# Patient Record
Sex: MALE | Race: WHITE | Employment: OTHER | URBAN - METROPOLITAN AREA
[De-identification: names, ages, dates, MRNs, and addresses within clinical notes are randomized per-mention and may not be internally consistent; named-entity substitution may affect disease eponyms.]

---

## 2019-01-28 ENCOUNTER — OFFICE VISIT (OUTPATIENT)
Dept: FAMILY MEDICINE CLINIC | Age: 64
End: 2019-01-28

## 2019-01-28 VITALS
DIASTOLIC BLOOD PRESSURE: 80 MMHG | TEMPERATURE: 95.4 F | HEIGHT: 75 IN | BODY MASS INDEX: 35.56 KG/M2 | WEIGHT: 286 LBS | HEART RATE: 50 BPM | RESPIRATION RATE: 18 BRPM | OXYGEN SATURATION: 99 % | SYSTOLIC BLOOD PRESSURE: 137 MMHG

## 2019-01-28 DIAGNOSIS — I48.20 CHRONIC ATRIAL FIBRILLATION (HCC): ICD-10-CM

## 2019-01-28 DIAGNOSIS — K21.9 GASTROESOPHAGEAL REFLUX DISEASE WITHOUT ESOPHAGITIS: ICD-10-CM

## 2019-01-28 DIAGNOSIS — Z98.890 H/O ARTHROSCOPIC KNEE SURGERY: ICD-10-CM

## 2019-01-28 DIAGNOSIS — Z92.3 S/P RADIATION THERAPY: ICD-10-CM

## 2019-01-28 DIAGNOSIS — G89.4 CHRONIC PAIN SYNDROME: ICD-10-CM

## 2019-01-28 DIAGNOSIS — Z99.89 OSA ON CPAP: ICD-10-CM

## 2019-01-28 DIAGNOSIS — N20.0 KIDNEY STONES: ICD-10-CM

## 2019-01-28 DIAGNOSIS — F41.9 ANXIETY AND DEPRESSION: ICD-10-CM

## 2019-01-28 DIAGNOSIS — Z98.890 HISTORY OF BACK SURGERY: ICD-10-CM

## 2019-01-28 DIAGNOSIS — I10 ESSENTIAL HYPERTENSION: ICD-10-CM

## 2019-01-28 DIAGNOSIS — Z96.653 HISTORY OF BILATERAL KNEE REPLACEMENT: ICD-10-CM

## 2019-01-28 DIAGNOSIS — E03.9 ACQUIRED HYPOTHYROIDISM: ICD-10-CM

## 2019-01-28 DIAGNOSIS — C44.90 SKIN CANCER: ICD-10-CM

## 2019-01-28 DIAGNOSIS — F32.A ANXIETY AND DEPRESSION: ICD-10-CM

## 2019-01-28 DIAGNOSIS — I34.0 MITRAL VALVE INSUFFICIENCY, UNSPECIFIED ETIOLOGY: ICD-10-CM

## 2019-01-28 DIAGNOSIS — C44.81 BASAL CELL CARCINOMA (BCC) OF OVERLAPPING SITES OF SKIN: ICD-10-CM

## 2019-01-28 DIAGNOSIS — G43.909 MIGRAINE SYNDROME: ICD-10-CM

## 2019-01-28 DIAGNOSIS — C07 CANCER OF PAROTID GLAND (HCC): Primary | ICD-10-CM

## 2019-01-28 DIAGNOSIS — G47.33 OSA ON CPAP: ICD-10-CM

## 2019-01-28 DIAGNOSIS — E66.01 SEVERE OBESITY (HCC): ICD-10-CM

## 2019-01-28 RX ORDER — HYDROCODONE BITARTRATE AND ACETAMINOPHEN 10; 325 MG/1; MG/1
1 TABLET ORAL
Qty: 60 TAB | Refills: 0 | Status: SHIPPED | OUTPATIENT
Start: 2019-01-28 | End: 2021-08-27 | Stop reason: ALTCHOICE

## 2019-01-28 RX ORDER — BUSPIRONE HYDROCHLORIDE 10 MG/1
10 TABLET ORAL 3 TIMES DAILY
COMMUNITY
End: 2019-06-21 | Stop reason: SDUPTHER

## 2019-01-28 RX ORDER — LUBIPROSTONE 24 UG/1
CAPSULE, GELATIN COATED ORAL
COMMUNITY
End: 2019-10-04 | Stop reason: SDUPTHER

## 2019-01-28 RX ORDER — LEVOTHYROXINE SODIUM 100 UG/1
TABLET ORAL
COMMUNITY
End: 2019-01-28 | Stop reason: SDUPTHER

## 2019-01-28 RX ORDER — MELATONIN
DAILY
COMMUNITY

## 2019-01-28 RX ORDER — METOPROLOL TARTRATE 25 MG/1
12.5 TABLET, FILM COATED ORAL
COMMUNITY
End: 2019-10-08 | Stop reason: DRUGHIGH

## 2019-01-28 RX ORDER — TAMSULOSIN HYDROCHLORIDE 0.4 MG/1
0.4 CAPSULE ORAL DAILY
COMMUNITY
End: 2019-11-04

## 2019-01-28 RX ORDER — HYDROCODONE BITARTRATE AND ACETAMINOPHEN 10; 325 MG/1; MG/1
1 TABLET ORAL
COMMUNITY
End: 2019-01-28 | Stop reason: SDUPTHER

## 2019-01-28 RX ORDER — TIZANIDINE HYDROCHLORIDE 4 MG/1
4 CAPSULE, GELATIN COATED ORAL AS NEEDED
COMMUNITY
End: 2020-01-27 | Stop reason: SINTOL

## 2019-01-28 RX ORDER — TESTOSTERONE CYPIONATE 200 MG/ML
INJECTION INTRAMUSCULAR
COMMUNITY
End: 2019-03-12 | Stop reason: SDUPTHER

## 2019-01-28 RX ORDER — LEVOTHYROXINE SODIUM 100 UG/1
100 TABLET ORAL
Qty: 90 TAB | Refills: 3 | Status: SHIPPED | OUTPATIENT
Start: 2019-01-28 | End: 2022-03-04 | Stop reason: SDUPTHER

## 2019-01-28 RX ORDER — LISINOPRIL 40 MG/1
40 TABLET ORAL DAILY
COMMUNITY
End: 2019-10-08 | Stop reason: DRUGHIGH

## 2019-01-28 RX ORDER — AMLODIPINE BESYLATE 5 MG/1
5 TABLET ORAL DAILY
Qty: 90 TAB | Refills: 3 | Status: SHIPPED | OUTPATIENT
Start: 2019-01-28 | End: 2022-01-06

## 2019-01-28 RX ORDER — TRIAMCINOLONE ACETONIDE 1 MG/G
CREAM TOPICAL
COMMUNITY
End: 2019-09-13

## 2019-01-28 RX ORDER — HYDROGEN PEROXIDE 3 %
SOLUTION, NON-ORAL MISCELLANEOUS DAILY
COMMUNITY
End: 2019-03-07 | Stop reason: SDUPTHER

## 2019-01-28 RX ORDER — FUROSEMIDE 20 MG/1
TABLET ORAL DAILY
COMMUNITY
End: 2019-01-28

## 2019-01-28 NOTE — PROGRESS NOTES
Chief Complaint Patient presents with Dyson Establish Care  Other  
  pt states that the insurance does not require referrals Michaelyn Daily .1. Have you been to the ER, urgent care clinic since your last visit? Hospitalized since your last visit? No 
 
2. Have you seen or consulted any other health care providers outside of the [address] [place] [address] since your last visit? Include any pap smears or colon screening.  No

## 2019-01-28 NOTE — PROGRESS NOTES
HISTORY OF PRESENT ILLNESS Mirna Tinajero is a 70 y.o. male. Patient is here to establish care. Patient had extensive medical history as well as surgical history and has recently moved back from [place]. He mentions In [place] he was seeing 11 specialists including his PCP. He has brought in a three page list of all the surgeries and medical issues he has as well as his medications. I have reviewed medication list. Patient is currently on hydrocodone and lyrica  for which he was receiving from his previous pain management doctor who also referred patient to have [employer] nerve stimulator in 2010 and patient mentions he just recently received a call from the company telling him it will be 8years old soon and he will need to have this replaced. Patient would like a referral to both pain management and neurosurgery. Patient has also has multiple lower back procedures and removal of a mass on the spine. He mentions he has 2 rods and 10 screws. Patient has also had knee surgery with bilateral replacements. In addition to this patient mentions he was to have extensive skin cancer 8 squamous and 1 basal cell cancer of the scalp , neck and forehead. He was under the care of dermatologist. Patient also had his precancer checks very often and has had lesions removed in the scalp , face , neck and arms. He would like to see a dermatologist here in [facility]. 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 He had a restorative surgery done in 2009 of the left side of face. He also received 28 sessions of RT. He mentions he was told he developed hypothyroidism secondary to radiation therapy and is currently on treatment.  
Patient also has a history of major depression and anxiety which does not seem to be well controlled and would like to see a psychiatrist and behavioral therapist. 
In addition to this patient is on medication for hypertension , hypogonadism , GERD /shanita on cpap. He would like to get back in to see pain management / neurosurgeon/ dermatology / psychiatry  . I have discussed ordering basic blood work today. He would also like a refill on his pain medication and I have explained this will be one time short term refill until he gets into pain management. Patient verbalizes an understanding. I have asked patient to bring in old records and he will sign for release. He mentions he does feel fine today other than his left shoulder which has been bothering him and he mentions he is in the process of talking to a orthopedic doctor in regards to this. I have also explained to him as multiple referrals have been placed he will need to provide the office with old records to better help this process. Establish Care The history is provided by the patient. This is a new problem. The problem occurs constantly. The problem has not changed since onset. Pertinent negatives include no chest pain, no abdominal pain, no headaches and no shortness of breath. The symptoms are aggravated by twisting. Nothing relieves the symptoms. He has tried nothing for the symptoms. Review of Systems Constitutional: Positive for malaise/fatigue. Negative for chills and fever. HENT: Negative for congestion, ear discharge, hearing loss, nosebleeds and sore throat. Eyes: Negative for double vision, pain and discharge. Respiratory: Negative for cough, sputum production, shortness of breath and wheezing. Cardiovascular: Negative for chest pain and palpitations. Gastrointestinal: Negative for abdominal pain, blood in stool, diarrhea, nausea and vomiting. Genitourinary: Negative for dysuria and urgency. Musculoskeletal: Positive for back pain, joint pain and neck pain. Neurological: Positive for speech change and weakness. Negative for dizziness, tingling and headaches. Endo/Heme/Allergies: Negative for environmental allergies. Psychiatric/Behavioral: Positive for depression. The patient is nervous/anxious and has insomnia. Visit Vitals /80 (BP 1 Location: Right arm, BP Patient Position: Sitting) Pulse (!) 50 Temp 95.4 °F (35.2 °C) (Oral) Resp 18 Ht 6' 3\" (1.905 m) Wt 286 lb (129.7 kg) SpO2 99% BMI 35.75 kg/m² Physical Exam  
Constitutional: He is oriented to person, place, and time. He appears well-developed and well-nourished. No distress. HENT:  
Head: Normocephalic and atraumatic. Right Ear: External ear normal.  
Left Ear: External ear normal.  
Mouth/Throat: Oropharynx is clear and moist. No oropharyngeal exudate. Eyes: EOM are normal. Pupils are equal, round, and reactive to light. No scleral icterus. Neck: Normal range of motion. No thyromegaly present. Cardiovascular: Regular rhythm and normal heart sounds. Pulmonary/Chest: Effort normal. No respiratory distress. He has no wheezes. Abdominal: Soft. Bowel sounds are normal. He exhibits no distension. There is no tenderness. Neurological: He is alert and oriented to person, place, and time. Psychiatric: He has a normal mood and affect. ASSESSMENT and PLAN Multiple medical issues : 
- Patient has provided a detail list of his medical issues with medication  
- he will also sign for release of records and mentions as he just recently moved he has some records in storage  And will drop them off to office Chronic pain syndrome : 
- 1) Drug screen to be ordered. 2) Medication to be filled for short term period. 3) You will need to see a pain management doctor/ neurosurgeon 4) Previous records and x-rays have been requested and partial medical history reviewed - currently has a pain stimulator in place , please follow up with specialist to have this replaced  reviewed Multiple skin cancer lesions removed and h/o skin cancer : 
- regularly check with dermatology - Referral has been made - Please wear sunscreen and avoid sun at times we discussed Hypertension : 
1) Goal blood pressure less than equal to 140/90 mmHg, goal BP can vary depending on risk factors as discussed. 2) Lifestyle modifications discussed with patient, low sodium <2 gm, low salt , DASH diet 3) Exercise for at least 30 min 3-5 times a week for goal BMI of less than or equal  To 25. 
4) Continue current medications as prescribed. 5) Please begin medication as discussed for better blood pressure control, explained side effects and patient verbalized understanding. 6) Goal LDL<100. 
7) Please monitor your blood pressure and keep a log to bring in with you at each visit. 8) Discussed risk factors with patient such as CAD, FAST of stroke symptoms, pt verbalizes understanding. 9) Please avoid smoking , alcohol and any illicit drug use if applicable to you. 10) Discussed lifestyle modifications ,dietary control and BP monitoring at home Hypothyroidism : 
 
-Please have blood work ordered completed today and medication to be adjusted if required. 
-Continue current medication at present dose. - Continue to exercise 30 minutes 3-5 times a week to maintain BMI of 25 or less. Anxiety and depression : 
- Referral has  Been made to see specialist  
 
 
Please come back on Friday to have blood work done

## 2019-01-28 NOTE — LETTER
AGREEMENT for controlled medication treatment [facility], have agreed to a course of treatment that includes taking controlled medication. For this treatment I designate _Dr. Maranda Cranker Patel_____________ as the Designated Provider.  The purpose of this agreement is to prevent misunderstandings about controlled medications I may be taking for treatment, and to comply with applicable laws. I understand this agreement is essential to the trust and confidence necessary in a provider-patient relationship and that the designated provider will treat me in accordance with the statements below. As part of my treatment I agree to the followin.  USE 
a. I will take the controlled medication as instructed by the designated provider and avoid improper use of controlled medications. b.   I will not share, sell or trade controlled medication with anyone as this is a criminal offense.  
c.   I will not use any illegal controlled substance, including marijuana, cocaine, etc. as this is a criminal offense. 2.  PROVIDERS 
a. I will only obtain controlled medication from the designated provider. b.   I will not attempt to obtain the same or similar controlled medications, such as opioid pain medication, stimulants, anti-anxiety or hypnotics from any other provider as this is a criminal offense. 
c.   I will inform the designated provider about all other licensed professionals providing medical care to me and authorize communication between all providers to coordinate care, particularly prescribing or dispensing of controlled medications. 3.  PHARMACY 
a.   I will only fill controlled medication prescriptions at the approved pharmacy as listed below. 4.  OFFICE VISITS 
a. I agree to attend scheduled office visit appointments. b.   I am aware my office visits may be monthly or as determined necessary by the designated provider. c.   I will communicate fully with the designated provider about the character and intensity of my symptoms, the effect of the symptoms on my daily life, and how well the controlled medication is helping to relieve the cause of my symptoms. 5.  REFILLS OR CALL-IN PRESCRIPTIONS OF CONTROLLED MEDICATIONS 
a. I agree that refills of my prescriptions for controlled medications will be made at the time of an office visit during regular office hours. No refills will be available during evenings or on weekends. Abusive or inappropriate behavior related to medication refills will not be tolerated. b.   I will not call the office repeatedly to inquire about my controlled medication. I understand that medications will be written on the due date and not before. Calling the office repeatedly will be considered harassment, and I may be discharged from the practice. c.   Controlled medications may not be called in for refill, but doing so is at the discretion of the designated provider. d.   I am aware that only I must  prescriptions for controlled medications at the office but the designated provider may allow a designee to  the prescription from the office under very specific circumstance that may develop. 6.  TOXICOLOGY SCREENING 
a. I agree to random urine toxicology screenings in order to comply with government and [address] [place] [address] regulations. I understand that I will be financially responsible for any charges incurred for the urine toxicology screening, which may not be covered by my insurance. Failure to do so will be considered non-compliance and I may be discharged. b. In some cases an oral swab or hair sample may be substituted for a urine screen. This is at the discretion of the designated provider.   I understand that I will be financially responsible for any charges incurred as well. 
c.   I understand that if the urine toxicology does not show my medications prescribed to me by the designated provider, or it shows any illegal substance or any other medications NOT prescribed by the designated providers, I may be discharged from this practice at the discretion of the designated provider and no further controlled medications will be prescribed or follow-up appointments scheduled. Also, if any illegal substances are detected on the urine toxicology, this information may be provided to local law enforcement. 7. PILL COUNTS 
a. I am aware I may be called at any time to come into the office for a count of all my remaining controlled medications in order to help the designated provider understand the rate at which I use my controlled medications and to more effectively adjust dosage. b. I agree that I will use my medication at a rate NO greater than the prescribed rate and that use of my medication at a greater rate will result in my being without medication for the period of time until next expected due date. c. I will bring in the containers with the medication prescribed by all providers, including the designated provider, to each office visit even if there is no medication remaining. All controlled medication will be in the original containers from the pharmacy for each medication. Failure to do so will be considered non-compliance and I may be discharged from the practice. 8.  LOSS OR THEFT OF MEDICATION 
a. I will safeguard my controlled medication from loss or theft. b.   Lost or stolen controlled medication may not be replaced. This includes a prescription that has not yet been filled at the pharmacy. c.   In the event my controlled medications are stolen or lost, I will notify the designated providers office immediately. If such event occurs during the night, weekend or holiday, I will leave a detailed message on the answering machine or answering service at the number listed above. d.   I will file and produce an official police report for any effort to replace controlled medications prescribed. 9.  AGENCY COLLABORATION I authorize the designated provider and the authorized pharmacy/pharmacist to cooperate fully with any city, state, or federal law enforcement agency in the investigation of any possible misuse, sale or other diversion of my controlled medication. 10.  TREATMENT I understand that if I violate any of the conditions, my controlled medication and/or treatment will be terminated. If the violation involves obtaining controlled substances and/or dangerous drugs from another source, the incident may be reported to other medical facilities and authorities, including law enforcement. In this case, the designated provider may taper off the medication over a period of several days, as necessary, to avoid withdrawal symptoms or will suggest alternate treatment facilities. Also, a drug dependence treatment program may be recommended. 11.  AGREEMENT I agree to follow these guidelines that have been fully explained to me. All of my questions and concerns regarding treatment have been adequately answered. A copy of this document has been given to me. I agree to use ______________________________ Authorized Pharmacy located at _________________________________________________________________ Telephone ________________________________for filling ALL controlled medication prescriptions. This agreement is entered into on 1/28/2019 Patient signature__________________________________________________________ Legal Guardian signature___________________________________________________ Provider signature_________________________________________________________ Witness signature_________________________________________________________

## 2019-02-01 ENCOUNTER — HOSPITAL ENCOUNTER (OUTPATIENT)
Dept: LAB | Age: 64
Discharge: HOME | End: 2019-02-01
Payer: MEDICARE

## 2019-02-01 DIAGNOSIS — Z98.890 HISTORY OF BACK SURGERY: ICD-10-CM

## 2019-02-01 DIAGNOSIS — E03.9 ACQUIRED HYPOTHYROIDISM: ICD-10-CM

## 2019-02-01 DIAGNOSIS — Z96.653 HISTORY OF BILATERAL KNEE REPLACEMENT: ICD-10-CM

## 2019-02-01 DIAGNOSIS — F32.A ANXIETY AND DEPRESSION: ICD-10-CM

## 2019-02-01 DIAGNOSIS — G43.909 MIGRAINE SYNDROME: ICD-10-CM

## 2019-02-01 DIAGNOSIS — F41.9 ANXIETY AND DEPRESSION: ICD-10-CM

## 2019-02-01 DIAGNOSIS — G89.4 CHRONIC PAIN SYNDROME: ICD-10-CM

## 2019-02-01 LAB
ALBUMIN SERPL-MCNC: 3.8 G/DL (ref 3.4–5)
ALBUMIN/GLOB SERPL: 1.1 {RATIO} (ref 0.8–1.7)
ALP SERPL-CCNC: 134 U/L (ref 45–117)
ALT SERPL-CCNC: 21 U/L (ref 16–61)
AMPHET UR QL SCN: NEGATIVE
ANION GAP SERPL CALC-SCNC: 8 MMOL/L (ref 3–18)
AST SERPL-CCNC: 15 U/L (ref 15–37)
BARBITURATES UR QL SCN: NEGATIVE
BASOPHILS # BLD: 0.1 K/UL (ref 0–0.1)
BASOPHILS NFR BLD: 1 % (ref 0–2)
BENZODIAZ UR QL: NEGATIVE
BILIRUB SERPL-MCNC: 0.8 MG/DL (ref 0.2–1)
BUN SERPL-MCNC: 13 MG/DL (ref 7–18)
BUN/CREAT SERPL: 13 (ref 12–20)
CALCIUM SERPL-MCNC: 8.9 MG/DL (ref 8.5–10.1)
CANNABINOIDS UR QL SCN: NEGATIVE
CHLORIDE SERPL-SCNC: 107 MMOL/L (ref 100–108)
CHOLEST SERPL-MCNC: 136 MG/DL
CO2 SERPL-SCNC: 26 MMOL/L (ref 21–32)
COCAINE UR QL SCN: NEGATIVE
CREAT SERPL-MCNC: 1.01 MG/DL (ref 0.6–1.3)
DIFFERENTIAL METHOD BLD: NORMAL
EOSINOPHIL # BLD: 0.1 K/UL (ref 0–0.4)
EOSINOPHIL NFR BLD: 2 % (ref 0–5)
ERYTHROCYTE [DISTWIDTH] IN BLOOD BY AUTOMATED COUNT: 13.1 % (ref 11.6–14.5)
GLOBULIN SER CALC-MCNC: 3.6 G/DL (ref 2–4)
GLUCOSE SERPL-MCNC: 80 MG/DL (ref 74–99)
HCT VFR BLD AUTO: 46.4 % (ref 36–48)
HDLC SERPL-MCNC: 46 MG/DL (ref 40–60)
HDLC SERPL: 3 {RATIO} (ref 0–5)
HDSCOM,HDSCOM: ABNORMAL
HGB BLD-MCNC: 15 G/DL (ref 13–16)
LDLC SERPL CALC-MCNC: 77.4 MG/DL (ref 0–100)
LIPID PROFILE,FLP: NORMAL
LYMPHOCYTES # BLD: 3.1 K/UL (ref 0.9–3.6)
LYMPHOCYTES NFR BLD: 38 % (ref 21–52)
MCH RBC QN AUTO: 30.9 PG (ref 24–34)
MCHC RBC AUTO-ENTMCNC: 32.3 G/DL (ref 31–37)
MCV RBC AUTO: 95.7 FL (ref 74–97)
METHADONE UR QL: NEGATIVE
MONOCYTES # BLD: 0.5 K/UL (ref 0.05–1.2)
MONOCYTES NFR BLD: 6 % (ref 3–10)
NEUTS SEG # BLD: 4.4 K/UL (ref 1.8–8)
NEUTS SEG NFR BLD: 53 % (ref 40–73)
OPIATES UR QL: POSITIVE
PCP UR QL: NEGATIVE
PLATELET # BLD AUTO: 279 K/UL (ref 135–420)
PMV BLD AUTO: 10.6 FL (ref 9.2–11.8)
POTASSIUM SERPL-SCNC: 4.7 MMOL/L (ref 3.5–5.5)
PROT SERPL-MCNC: 7.4 G/DL (ref 6.4–8.2)
RBC # BLD AUTO: 4.85 M/UL (ref 4.7–5.5)
SODIUM SERPL-SCNC: 141 MMOL/L (ref 136–145)
T4 FREE SERPL-MCNC: 1.4 NG/DL (ref 0.7–1.5)
TRIGL SERPL-MCNC: 63 MG/DL (ref ?–150)
TSH SERPL DL<=0.05 MIU/L-ACNC: 2.5 UIU/ML (ref 0.36–3.74)
VLDLC SERPL CALC-MCNC: 12.6 MG/DL
WBC # BLD AUTO: 8.2 K/UL (ref 4.6–13.2)

## 2019-02-01 PROCEDURE — 84443 ASSAY THYROID STIM HORMONE: CPT

## 2019-02-01 PROCEDURE — 80061 LIPID PANEL: CPT

## 2019-02-01 PROCEDURE — 80307 DRUG TEST PRSMV CHEM ANLYZR: CPT

## 2019-02-01 PROCEDURE — 80053 COMPREHEN METABOLIC PANEL: CPT

## 2019-02-01 PROCEDURE — 84439 ASSAY OF FREE THYROXINE: CPT

## 2019-02-01 PROCEDURE — 85025 COMPLETE CBC W/AUTO DIFF WBC: CPT

## 2019-02-04 ENCOUNTER — TELEPHONE (OUTPATIENT)
Dept: FAMILY MEDICINE CLINIC | Age: 64
End: 2019-02-04

## 2019-02-04 NOTE — TELEPHONE ENCOUNTER
Rupinder with [address] called and stated she can send the last 3 years records to include pet scans, stress tests, and ekg. Makenzie Sanderson stated pt's record is too extensive to send entire record dated back to 2004. If additional records are needed then a separate request will need to be provided with date specification.

## 2019-02-11 ENCOUNTER — TELEPHONE (OUTPATIENT)
Dept: FAMILY MEDICINE CLINIC | Age: 64
End: 2019-02-11

## 2019-02-13 ENCOUNTER — TELEPHONE (OUTPATIENT)
Dept: FAMILY MEDICINE CLINIC | Age: 64
End: 2019-02-13

## 2019-02-13 NOTE — TELEPHONE ENCOUNTER
Pt called requesting a referral for pain he is having in left hip. Pt states pain is debilitating and preventing him from walking. Pt stated he is also not able to sleep through the night due to pain in hip. Pt is scheduled for a first available acute appt for 03/07/19 @ 10:15am.  Pt would like a return call as he does not believe he discussed this with provider at np appt and pt stated the pain presented after his initial np appt.

## 2019-02-14 NOTE — TELEPHONE ENCOUNTER
Attempted to reach out to discuss the issue with patient it seems the phone line is not in service at this time. I can try again at another time however if the pain is acute he can go to an urgent care unit or come in for an x-ray of the left hip here first. He is already on pain medications and will be contacting pain management.

## 2019-02-22 ENCOUNTER — TELEPHONE (OUTPATIENT)
Dept: FAMILY MEDICINE CLINIC | Age: 64
End: 2019-02-22

## 2019-02-22 DIAGNOSIS — M25.552 LEFT HIP PAIN: Primary | ICD-10-CM

## 2019-02-22 NOTE — TELEPHONE ENCOUNTER
Pt following up on phone call regarding hip pain. Advised pt Dr Michael Sol has been trying to reach him. Followed up on phone # with pt. Pt stated phone # was wrong. Pt has given correct # and has been updated in the system. Advised pt of message left by Dr Barry Davis. Pt states does want to get an xray. States will come today for xray.

## 2019-02-25 ENCOUNTER — TELEPHONE (OUTPATIENT)
Dept: FAMILY MEDICINE CLINIC | Age: 64
End: 2019-02-25

## 2019-03-07 ENCOUNTER — OFFICE VISIT (OUTPATIENT)
Dept: FAMILY MEDICINE CLINIC | Age: 64
End: 2019-03-07

## 2019-03-07 VITALS
WEIGHT: 286 LBS | BODY MASS INDEX: 35.56 KG/M2 | TEMPERATURE: 95.1 F | HEIGHT: 75 IN | DIASTOLIC BLOOD PRESSURE: 69 MMHG | OXYGEN SATURATION: 98 % | SYSTOLIC BLOOD PRESSURE: 126 MMHG | HEART RATE: 48 BPM | RESPIRATION RATE: 16 BRPM

## 2019-03-07 DIAGNOSIS — Z98.890 HISTORY OF BACK SURGERY: ICD-10-CM

## 2019-03-07 DIAGNOSIS — M16.12 PRIMARY OSTEOARTHRITIS OF LEFT HIP: Primary | ICD-10-CM

## 2019-03-07 DIAGNOSIS — Z96.653 HISTORY OF BILATERAL KNEE REPLACEMENT: ICD-10-CM

## 2019-03-07 DIAGNOSIS — I48.20 CHRONIC ATRIAL FIBRILLATION (HCC): ICD-10-CM

## 2019-03-07 DIAGNOSIS — E29.1 HYPOGONADISM MALE: ICD-10-CM

## 2019-03-07 DIAGNOSIS — G89.4 CHRONIC PAIN SYNDROME: ICD-10-CM

## 2019-03-07 RX ORDER — HYDROCODONE BITARTRATE AND ACETAMINOPHEN 10; 325 MG/1; MG/1
1 TABLET ORAL
Qty: 60 TAB | Refills: 0 | Status: SHIPPED | OUTPATIENT
Start: 2019-03-07 | End: 2019-04-06

## 2019-03-07 RX ORDER — HYDROGEN PEROXIDE 3 %
40 SOLUTION, NON-ORAL MISCELLANEOUS DAILY
Qty: 90 CAP | Refills: 3 | Status: SHIPPED | OUTPATIENT
Start: 2019-03-07 | End: 2019-08-28 | Stop reason: SDUPTHER

## 2019-03-07 NOTE — PROGRESS NOTES
HISTORY OF PRESENT ILLNESS  Jann Vale is a 70 y.o. male. Patient is here to follow up on his left hip pain which has been getting worse and I have reviewed recent x-ray with him. I have discussed referral to orthopedic specialist for possible further therapy or joint injections as patient mentions he has a lot of pain in the left hip which radiates to the groin. He also mentions he used to be on testosterone shots and he is wondering if he can go back on. I will order blood work. He would also like a referral for chronic afib which is stable. He would also like to have a referral to pain management but with Dr. Wojciech Pak. His referral to neurosurgery  Has to be redone as who he was referred to does not except insurance for recheck on pain stimulator. He mentions he will need a refill on pain medication as his stimulator has not  and he is in a lot of pain. Hip Injury    The history is provided by the patient. This is a chronic problem. The problem occurs constantly. The problem has been gradually worsening. The pain is present in the left hip. The quality of the pain is described as aching, pounding and sharp. The pain is at a severity of 8/10. The pain is severe. Associated symptoms include stiffness, back pain and neck pain. The symptoms are aggravated by movement and palpation. He has tried arthritis medications for the symptoms. The treatment provided mild relief. There has been no history of extremity trauma. Review of Systems   Constitutional: Positive for malaise/fatigue. Negative for chills, fever and weight loss. HENT: Negative for congestion, ear discharge, ear pain, hearing loss, sinus pain and sore throat. Eyes: Negative for blurred vision, double vision, pain and discharge. Respiratory: Negative for cough, hemoptysis, sputum production, shortness of breath and wheezing. Cardiovascular: Negative for chest pain, palpitations, claudication and leg swelling. Gastrointestinal: Positive for heartburn. Negative for abdominal pain, blood in stool, diarrhea, nausea and vomiting. Genitourinary: Positive for urgency. Negative for dysuria, flank pain, frequency and hematuria. Musculoskeletal: Positive for back pain, joint pain, neck pain and stiffness. Neurological: Negative for dizziness, focal weakness and headaches. Psychiatric/Behavioral: Negative for depression. The patient is not nervous/anxious. Physical Exam   Constitutional: He is oriented to person, place, and time. He appears well-developed and well-nourished. No distress. HENT:   Head: Normocephalic and atraumatic. Right Ear: External ear normal.   Left Ear: External ear normal.   Mouth/Throat: Oropharynx is clear and moist. No oropharyngeal exudate. Eyes: EOM are normal. Pupils are equal, round, and reactive to light. No scleral icterus. Neck: Normal range of motion. No thyromegaly present. Cardiovascular: Normal rate, regular rhythm and normal heart sounds. Pulmonary/Chest: Effort normal and breath sounds normal. No respiratory distress. He has no wheezes. Abdominal: Soft. Bowel sounds are normal. He exhibits no distension. There is no tenderness. Musculoskeletal:        Left hip: He exhibits decreased range of motion, decreased strength and tenderness. Legs:  Lymphadenopathy:     He has no cervical adenopathy. Neurological: He is alert and oriented to person, place, and time. Psychiatric: He has a normal mood and affect. ASSESSMENT and PLAN  Left hip osteoarthritis :  - discussed referral to orthopedic  - pain medication refilled short term    Hypogonadism ;'  - come back for blood work    Chronic Afib:  - Coronary artery disease risk factors discussed. - Please maintain routine exercise regimen , 30 minutes 3-5 times a week. - Cholesterol and blood pressure control  - Ok to take aspirin 81 mg daily  - Continue current medications and f/u with cardiology. New referrals to be processed for pain management and neurosurgery

## 2019-03-07 NOTE — ACP (ADVANCE CARE PLANNING)
Advance Care Planning (ACP) Provider Note - Comprehensive     Date of ACP Conversation: 03/07/19  Persons included in Conversation:  patient  Length of ACP Conversation in minutes:  <16 minutes (Non-Billable)    Authorized Decision Maker (if patient is incapable of making informed decisions):    This person is:  Healthcare Agent/Medical Power of  under Advance Directive            General ACP for ALL Patients with Decision Making Capacity:   Importance of advance care planning, including choosing a healthcare agent to communicate patient's healthcare decisions if patient lost the ability to make decisions, such as after a sudden illness or accident    Review of Existing Advance Directive:  Has this in place    For Serious or Chronic Illness:  Aware     Interventions Provided:  Recommended completion of Advance Directive form after review of ACP materials and conversation with prospective healthcare agent   Recommended communicating the plan and making copies for the healthcare agent, personal physician, and others as appropriate (e.g., health system)  Recommended review of completed ACP document annually or upon change in health status

## 2019-03-08 ENCOUNTER — HOSPITAL ENCOUNTER (OUTPATIENT)
Dept: LAB | Age: 64
Discharge: HOME | End: 2019-03-08
Payer: MEDICARE

## 2019-03-08 ENCOUNTER — TELEPHONE (OUTPATIENT)
Dept: FAMILY MEDICINE CLINIC | Age: 64
End: 2019-03-08

## 2019-03-08 ENCOUNTER — LAB ONLY (OUTPATIENT)
Dept: FAMILY MEDICINE CLINIC | Age: 64
End: 2019-03-08

## 2019-03-08 DIAGNOSIS — E29.1 HYPOGONADISM MALE: ICD-10-CM

## 2019-03-08 DIAGNOSIS — Z01.89 ROUTINE LAB DRAW: Primary | ICD-10-CM

## 2019-03-08 PROCEDURE — 84403 ASSAY OF TOTAL TESTOSTERONE: CPT

## 2019-03-08 RX ORDER — ESOMEPRAZOLE MAGNESIUM 40 MG/1
40 CAPSULE, DELAYED RELEASE ORAL DAILY
Qty: 180 CAP | Refills: 3 | Status: SHIPPED | OUTPATIENT
Start: 2019-03-08 | End: 2022-01-06 | Stop reason: ALTCHOICE

## 2019-03-08 NOTE — TELEPHONE ENCOUNTER
Spoke to provider asked for a verbal order so patient may have their medication today, sent over 40mg nexium, 90 day supply with 3 refills, patient aware as well

## 2019-03-08 NOTE — TELEPHONE ENCOUNTER
Per fax from [address] [facility] esomeprazole dr 20 mg cap requires a PA. Or can send in a new rx for 40 mg once a day.

## 2019-03-10 LAB
TESTOST FREE SERPL-MCNC: 8.8 PG/ML (ref 6.6–18.1)
TESTOST SERPL-MCNC: 406 NG/DL (ref 264–916)

## 2019-03-12 ENCOUNTER — TELEPHONE (OUTPATIENT)
Dept: FAMILY MEDICINE CLINIC | Age: 64
End: 2019-03-12

## 2019-03-12 ENCOUNTER — DOCUMENTATION ONLY (OUTPATIENT)
Dept: FAMILY MEDICINE CLINIC | Age: 64
End: 2019-03-12

## 2019-03-12 DIAGNOSIS — E29.1 HYPOGONADISM MALE: Primary | ICD-10-CM

## 2019-03-12 NOTE — TELEPHONE ENCOUNTER
Pt requested this Rx and needles. Requested Prescriptions     Pending Prescriptions Disp Refills    testosterone cypionate (DEPOTESTOTERONE CYPIONATE) 200 mg/mL injection 0 Vial      Sig: by IntraMUSCular route every seven (7) days.

## 2019-03-12 NOTE — TELEPHONE ENCOUNTER
Pt requests a referral to [facility], [address], [place]: [phone]

## 2019-03-13 ENCOUNTER — TELEPHONE (OUTPATIENT)
Dept: FAMILY MEDICINE CLINIC | Age: 64
End: 2019-03-13

## 2019-03-13 RX ORDER — TESTOSTERONE CYPIONATE 200 MG/ML
100 INJECTION INTRAMUSCULAR
Qty: 1 VIAL | Refills: 3 | Status: SHIPPED | OUTPATIENT
Start: 2019-03-13 | End: 2019-06-21 | Stop reason: SDUPTHER

## 2019-03-18 NOTE — TELEPHONE ENCOUNTER
Thank you Alexandra Boyer and referral sent as patient requested.     [facility]   [address] [facility], [address]  Phone: [phone]  Fax: [phone]

## 2019-03-19 ENCOUNTER — TELEPHONE (OUTPATIENT)
Dept: FAMILY MEDICINE CLINIC | Age: 64
End: 2019-03-19

## 2019-03-19 NOTE — TELEPHONE ENCOUNTER
t requesting rx for syringes to be sent to pharmacy. Pt states wife able to give him testosterone injections.   Please send to pharmacy on file

## 2019-03-22 ENCOUNTER — OFFICE VISIT (OUTPATIENT)
Dept: ORTHOPEDIC SURGERY | Facility: CLINIC | Age: 64
End: 2019-03-22

## 2019-03-22 VITALS
BODY MASS INDEX: 36.06 KG/M2 | WEIGHT: 290 LBS | SYSTOLIC BLOOD PRESSURE: 149 MMHG | TEMPERATURE: 96 F | RESPIRATION RATE: 18 BRPM | DIASTOLIC BLOOD PRESSURE: 86 MMHG | HEIGHT: 75 IN | HEART RATE: 67 BPM | OXYGEN SATURATION: 99 %

## 2019-03-22 DIAGNOSIS — M54.50 LUMBAR PAIN: ICD-10-CM

## 2019-03-22 DIAGNOSIS — M16.12 PRIMARY OSTEOARTHRITIS OF LEFT HIP: ICD-10-CM

## 2019-03-22 DIAGNOSIS — M54.32 SCIATICA, LEFT SIDE: ICD-10-CM

## 2019-03-22 DIAGNOSIS — M70.62 TROCHANTERIC BURSITIS OF LEFT HIP: Primary | ICD-10-CM

## 2019-03-22 DIAGNOSIS — M54.16 LUMBAR RADICULOPATHY: ICD-10-CM

## 2019-03-22 DIAGNOSIS — M25.552 LEFT HIP PAIN: ICD-10-CM

## 2019-03-22 RX ORDER — BUPIVACAINE HYDROCHLORIDE 2.5 MG/ML
4 INJECTION, SOLUTION EPIDURAL; INFILTRATION; INTRACAUDAL ONCE
Qty: 4 ML | Refills: 0
Start: 2019-03-22 | End: 2019-03-22

## 2019-03-22 RX ORDER — IBUPROFEN 200 MG
TABLET ORAL
COMMUNITY
End: 2019-09-13

## 2019-03-22 RX ORDER — TRIAMCINOLONE ACETONIDE 40 MG/ML
40 INJECTION, SUSPENSION INTRA-ARTICULAR; INTRAMUSCULAR ONCE
Qty: 0.5 ML | Refills: 0
Start: 2019-03-22 | End: 2019-03-22

## 2019-03-22 RX ORDER — HYDROXYZINE 50 MG/1
50 TABLET, FILM COATED ORAL
COMMUNITY
End: 2020-01-27

## 2019-03-22 RX ORDER — FUROSEMIDE 20 MG/1
10 TABLET ORAL DAILY
COMMUNITY
Start: 2019-02-12 | End: 2019-11-27 | Stop reason: SDUPTHER
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
## 2019-03-25 ENCOUNTER — TELEPHONE (OUTPATIENT)
Dept: FAMILY MEDICINE CLINIC | Age: 64
End: 2019-03-25

## 2019-03-25 NOTE — TELEPHONE ENCOUNTER
Pt requesting referral to cardiology to be in [place]. Pt states [place] to far to travel.   [facility] was contacted by cardiology but has canceled appt

## 2019-03-25 NOTE — TELEPHONE ENCOUNTER
Pt called to let us know that the Neurosurgical referral to [facility] [address] [place] [address],-1 needs to be changed to a Neurosurgeon's office. They don't do surgery at above practice.

## 2019-03-26 NOTE — TELEPHONE ENCOUNTER
[address] (Tiffani Turner)  MD Jacquelyn Crain MD, [address] [address]. [address] [facility] [address], [address]  Phone: [phone]  Fax: [phone]    Faxed note and confirmation received.

## 2019-04-17 NOTE — TELEPHONE ENCOUNTER
Pt states has not heard anything regarding referral to cardiology or neuro surgeon. Gave pt all info on file for both referrals. Pt states will call them to set up an appt.

## 2019-04-17 NOTE — TELEPHONE ENCOUNTER
I spoke to [facility] and confirmed that they do not  Accept the patients insurance. Purnima agreed to call and notify the patient of this.  Pt's referral was sent to [facility] due to not having any other provider in the area accepting his insurance for dx reason of referral.

## 2019-04-17 NOTE — TELEPHONE ENCOUNTER
Pt states called [address] [place] [address] neuro for neuor surgeon. States was told all info was faxed to Vascular dept. States pt will need to see Dr Cate Love. States please fax all info to [facility] at Dr Cate Love office Fax # [phone], ph # [phone]. Advised pt office received communication from Dr Cate Love office stating unable to schedule appt due to insurance not accepted. Pt states Purnima did not mention that to him. States she needed all paperwork before she can schedule pt for appt. Pt states if Do not accept insurance please find a facility that accepts insurance in [place].   Pt states please call back with any updates

## 2019-04-17 NOTE — TELEPHONE ENCOUNTER
Correction to last note. Insurance is not accepted in the area and limited provider that will see pt for dx cause. [facility] is the location that accepts both.

## 2019-04-23 ENCOUNTER — TELEPHONE (OUTPATIENT)
Dept: FAMILY MEDICINE CLINIC | Age: 64
End: 2019-04-23

## 2019-05-01 ENCOUNTER — TELEPHONE (OUTPATIENT)
Dept: FAMILY MEDICINE CLINIC | Age: 64
End: 2019-05-01

## 2019-05-02 ENCOUNTER — OFFICE VISIT (OUTPATIENT)
Dept: ORTHOPEDIC SURGERY | Age: 64
End: 2019-05-02

## 2019-05-02 VITALS
HEART RATE: 62 BPM | HEIGHT: 75 IN | BODY MASS INDEX: 34.57 KG/M2 | SYSTOLIC BLOOD PRESSURE: 142 MMHG | WEIGHT: 278 LBS | DIASTOLIC BLOOD PRESSURE: 94 MMHG

## 2019-05-02 DIAGNOSIS — M47.814 THORACIC SPONDYLOSIS WITHOUT MYELOPATHY: ICD-10-CM

## 2019-05-02 DIAGNOSIS — M96.1 LUMBAR POST-LAMINECTOMY SYNDROME: ICD-10-CM

## 2019-05-02 DIAGNOSIS — M51.34 DDD (DEGENERATIVE DISC DISEASE), THORACIC: ICD-10-CM

## 2019-05-02 DIAGNOSIS — M47.817 LUMBOSACRAL SPONDYLOSIS WITHOUT MYELOPATHY: ICD-10-CM

## 2019-05-02 DIAGNOSIS — M51.36 DDD (DEGENERATIVE DISC DISEASE), LUMBAR: ICD-10-CM

## 2019-05-02 DIAGNOSIS — M54.50 LOW BACK PAIN AT MULTIPLE SITES: Primary | ICD-10-CM

## 2019-05-02 DIAGNOSIS — M54.16 LUMBAR NEURITIS: ICD-10-CM

## 2019-05-02 NOTE — LETTER
5/2/19 Patient: Gabby Whipple YOB: 1955 Date of Visit: 5/2/2019 Fain Paget, MD 
[address] [facility] [address] [address] VIA In Basket Flor Mooney MD 
[address] [address] [facility] 
[address] [facility] [address] VIA In Basket Dear Fain Paget, MD Josetta Creighton, MD, Thank you for referring Mr. Hunter Madrid to [address] [place] [address] for evaluation. My notes for this consultation are attached. If you have questions, please do not hesitate to call me. I look forward to following your patient along with you. Sincerely, Rosa Linton MD

## 2019-05-02 NOTE — TELEPHONE ENCOUNTER
Pt called  To ask for a referral to this neurosurgeon: Nisreen Curran Dr. He also stated he would like the cardio referral refaxed to [address] [facility] He said thank you so much.
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
## 2019-05-13 ENCOUNTER — TELEPHONE (OUTPATIENT)
Dept: FAMILY MEDICINE CLINIC | Age: 64
End: 2019-05-13

## 2019-05-13 ENCOUNTER — DOCUMENTATION ONLY (OUTPATIENT)
Dept: ORTHOPEDIC SURGERY | Age: 64
End: 2019-05-13

## 2019-05-13 NOTE — PROGRESS NOTES
Order and office notes have been faxed to [facility] to have them call patient to set up CT Myelogram for T&L-Spine. They will authorize with the insurance if needed. Patient aware.

## 2019-05-13 NOTE — TELEPHONE ENCOUNTER
Pt following up on referral to cardiology. Advised [facility]  States will call for appt

## 2019-05-21 ENCOUNTER — TELEPHONE (OUTPATIENT)
Dept: ORTHOPEDIC SURGERY | Age: 64
End: 2019-05-21

## 2019-05-21 NOTE — TELEPHONE ENCOUNTER
A fax was received from [facility] Cirilo Stein requesting the insurance authorization for the CT of the Thoracic and Lumbar Spine ordered at [facility]. I have forwarded the fax to Gillian Dueñas at fax [phone]. If we do not obtain the authorization then [facility] will cancel the tests. Thank you.

## 2019-05-21 NOTE — TELEPHONE ENCOUNTER
[employer] authorization obtained and faxed to [address] [place] [address] # [id number] valid 05-22-19 through 06-21-19

## 2019-05-28 ENCOUNTER — TELEPHONE (OUTPATIENT)
Dept: ORTHOPEDIC SURGERY | Age: 64
End: 2019-05-28

## 2019-05-28 NOTE — TELEPHONE ENCOUNTER
Called back to speak with Mercedes Noriega but did not get an answer. Will try again. I do not see that an MRI was ordered for this patient. I see that a CT Myelogram was order and signed by Dr. Shawn Allen.

## 2019-05-28 NOTE — TELEPHONE ENCOUNTER
Mary Noriega from [facility] called stating that they have an order for an MRI for Mr. Guadalupe Moreno that is signed by an NP and it needs to be signed by Dr. Patrecia Duverney himself. She said Mr. Guadalupe Moreno has an appointment tomorrow and needs this right away. She left her call back number as [phone] and her fax number as [phone].  Please advise

## 2019-06-06 NOTE — TELEPHONE ENCOUNTER
Spoke to Magan Harrell at [employer] . It appears that the CT Myelogram was ordered and was already completed. The original order was signed my Dr. Jaime Willis and not a NP. Magan Harrell believes this was just a miscommunication. Closing message at this time.

## 2019-06-20 ENCOUNTER — OFFICE VISIT (OUTPATIENT)
Dept: ORTHOPEDIC SURGERY | Age: 64
End: 2019-06-20

## 2019-06-20 VITALS
TEMPERATURE: 98 F | HEIGHT: 75 IN | OXYGEN SATURATION: 98 % | BODY MASS INDEX: 35.19 KG/M2 | SYSTOLIC BLOOD PRESSURE: 129 MMHG | HEART RATE: 75 BPM | DIASTOLIC BLOOD PRESSURE: 83 MMHG | RESPIRATION RATE: 20 BRPM | WEIGHT: 283 LBS

## 2019-06-20 DIAGNOSIS — M47.817 LUMBOSACRAL SPONDYLOSIS WITHOUT MYELOPATHY: ICD-10-CM

## 2019-06-20 DIAGNOSIS — M54.16 LUMBAR NEURITIS: ICD-10-CM

## 2019-06-20 DIAGNOSIS — M48.061 SPINAL STENOSIS OF LUMBAR REGION WITHOUT NEUROGENIC CLAUDICATION: ICD-10-CM

## 2019-06-20 DIAGNOSIS — M46.1 SACROILIITIS (HCC): Primary | ICD-10-CM

## 2019-06-20 DIAGNOSIS — M47.814 THORACIC SPONDYLOSIS WITHOUT MYELOPATHY: ICD-10-CM

## 2019-06-20 NOTE — LETTER
6/20/19 Patient: Deng Shirley YOB: 1955 Date of Visit: 6/20/2019 Saran Carrera MD 
[address] [place] [address] [address] [place] [id number] VIA In Basket Dear Saran Carrera MD, Thank you for referring Mr. Mattie Cole to [address] [place] [address] for evaluation. My notes for this consultation are attached. If you have questions, please do not hesitate to call me. I look forward to following your patient along with you. Sincerely, Kiara Molina MD

## 2019-06-20 NOTE — PROGRESS NOTES
[facility]  [address], [address]  Phone: [phone]  Fax: [phone]        PROGRESS NOTE      HISTORY OF PRESENT ILLNESS:  The patient is a 75 y.o. male and was seen today for follow up of low back pain extending into the LLE in an S1 distribution to the knee, with recent exacerbation after a fall out of the back of a Uhaul on 12/18/18. Pt reports bowel incontinence, with immediate onset following the incident. Denies bladder incontinence. Pt denies fever, weight loss, or skin changes. The patient is RHD. PmHx spinal surgery x 3, SCS implantation [employer]), squamous cell carcinoma. Pt is currently being followed by Dr. Rosalba Montero for chronic pain management. Note from Ramesh Lemos MD dated 3/7/19 indicating patient was seen with c/o left hip and groin pain. Noted, pt is being followed by Dr. Rosalba Montero for pain management. Note from Dr. Adonis Orozco dated 3/22/19 indicating patient was seen with c/o left hip pain since an accident on 12/18/18 when he fell out of the back of a Uhaul. He landed hard on his buttocks and has been experiencing pain ever since. Performed a left bursa injection at that time. Of note, pt has had spinal surgeries x 4 and had severe bleeding complications with his last surgery. Preliminary reading of lumbar plain films: SCS noted in the right lower quadrant with leads extending into the spinal column. Posterior fusion L2 through S1, with posterior hardware extending from L2 through L5. Hardware appears to be intact. Moderate degenerative changes noted extending from L1 to the lower thoracic spine. At his last clinical appointment, patient presented with c/o low back pain extending into the LLE, with recent exacerbation after a fall out of the back of a Uhaul on 12/18/18. He reported bowel incontinence, with immediate onset following the incident. I set him up for a CT myelogram of the lumbar and thoracic spine.  I advised patient to bring copies of films to next visit. I contacted the patient's SCS rep for an SCS evaluation and possible battery replacement. I recommended he continue to f/u with Dr. Yesenia Romero for pain management. The patient returns today with pain location and distribution remain unchanged. He continues to rate his pain 6/10. Pt denies any major changes since his last OV. He continue to report bowel incontinence x 6 months. Thoracic spine CT myelogram dated 5/29/19 reviewed. Per report, mild spinal canal narrowing at T10-11 due to facet arthropathy and ligamentum flavum hypertrophy. Moderate left foraminal narrowing at T3-4 due to a left facet arthropathy. Mild spondylosis of the remaining thoracic spine with no significant stenosis. Spinal cord stimulator electrodes at T8. Lumbar spine CT myelogram dated 5/29/19 reviewed. Per report, postoperative changes from L2 to S1 from posterior spinal decompression and fusion. The central canal and recesses are well decompressed at these levels. Moderate central canal and right foraminal narrowing at L1-L2 due to a disc bulge and facet arthropathy. Findings consistent with arachnoiditis. Nonobstructing right renal calculus.  reviewed. Body mass index is 35.37 kg/m².       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    Cancer ([facility] [address]     Depression     Endocrine disease     Hypothyroidism    Fractures 1978, 2000    ankle left, left wrist    Headache     Hearing loss 2013    Hypertension     Ill-defined condition     Pain stimulator in back    Ill-defined condition     irregular heartbeat    Psychiatric disorder     depression    Thyroid disease         Social History     Socioeconomic History    Marital status:      Spouse name: Not on file    Number of children: Not on file    Years of education: Not on file    Highest education level: Not on file   Occupational History    Not on file   Social Needs    Financial resource strain: Not on file    Food insecurity:     Worry: Not on file     Inability: Not on file    Transportation needs:     Medical: Not on file     Non-medical: Not on file   Tobacco Use    Smoking status: Former Smoker     Last attempt to quit: 1987     Years since quittin.4    Smokeless tobacco: Never Used   Substance and Sexual Activity    Alcohol use: No     Frequency: Never    Drug use: No    Sexual activity: Never     Partners: Female   Lifestyle    Physical activity:     Days per week: Not on file     Minutes per session: Not on file    Stress: Not on file   Relationships    Social connections:     Talks on phone: Not on file     Gets together: Not on file     Attends [employer] service: Not on file     Active member of club or organization: Not on file     Attends meetings of clubs or organizations: Not on file     Relationship status: Not on file    Intimate partner violence:     Fear of current or ex partner: Not on file     Emotionally abused: Not on file     Physically abused: Not on file     Forced sexual activity: Not on file   Other Topics Concern    Not on file   Social History Narrative    ** Merged History Encounter **            Current Outpatient Medications   Medication Sig Dispense Refill    ibuprofen (MOTRIN) 200 mg tablet Take  by mouth.  Needle, Disp, 21 G (HYPODERMIC NEEDLES) 21 gauge x 1\" ndle Inject 0.5ml  intramuscular route every 7 days 10 Pen Needle 5    Syringe with Needle, Disp, (MONOJECT SAFETY SYRINGES) syrg To be used with medication 30 Syringe 3    testosterone cypionate (DEPOTESTOTERONE CYPIONATE) 200 mg/mL injection 0.5 mL by IntraMUSCular route every seven (7) days. Max Daily Amount: 100 mg. 1 Vial 3    esomeprazole (NEXIUM) 40 mg capsule Take 1 Cap by mouth daily.  180 Cap 3    lisinopril (PRINIVIL, ZESTRIL) 40 mg tablet Take 40 mg by mouth daily.  tamsulosin (FLOMAX) 0.4 mg capsule Take 0.4 mg by mouth daily.  metoprolol tartrate (LOPRESSOR) 25 mg tablet Take 12.5 mg by mouth every morning. 25mg 1/2 in am      tiZANidine (ZANAFLEX) 4 mg capsule Take 4 mg by mouth two (2) times a day.  AMLODIPINE BESYLATE, BULK, Take 5 mg by mouth daily.  HYDROcodone-acetaminophen (NORCO)  mg tablet Take 1 Tab by mouth every twelve (12) hours as needed for Pain. Max Daily Amount: 2 Tabs. 60 Tab 0    amLODIPine (NORVASC) 5 mg tablet Take 1 Tab by mouth daily. 90 Tab 3    levothyroxine (SYNTHROID) 100 mcg tablet Take 1 Tab by mouth Daily (before breakfast). 90 Tab 3    acetaminophen (TYLENOL) 325 mg tablet Take 2 Tabs by mouth every six (6) hours as needed for Pain. 20 Tab 0    furosemide (LASIX) 20 mg tablet       hydrOXYzine HCl (ATARAX) 50 mg tablet Take 50 mg by mouth three (3) times daily as needed for Itching.  esomeprazole (NEXIUM) 20 mg capsule Take 2 Caps by mouth daily. 90 Cap 3    busPIRone (BUSPAR) 10 mg tablet Take 10 mg by mouth three (3) times daily.  lubiPROStone (AMITIZA) 24 mcg capsule Take  by mouth.  triamcinolone acetonide (KENALOG) 0.1 % topical cream Apply  to affected area once as needed for Skin Irritation. use thin layer      cholecalciferol (VITAMIN D3) 1,000 unit tablet Take  by mouth daily.  albuterol (VENTOLIN HFA) 90 mcg/actuation inhaler Take 2 Puffs by inhalation every four (4) hours as needed for Wheezing.  (Patient not taking: Reported on 5/2/2019) 1 Inhaler 0       Allergies   Allergen Reactions    Codeine Rash    Codeine Hives    Doxycycline Other (comments)     Dark urine    Doxycycline Itching     Urine turned black    Topamax [Topiramate] Itching     Rash, itching    Topamax [Topiramate] Hives    Verapamil Other (comments)     tachycardia    Verapamil Palpitations          PHYSICAL EXAMINATION    Visit Vitals  /83   Pulse 75   Temp 98 °F (36.7 °C)   Resp 20 Ht 6' 3\" (1.905 m)   Wt 283 lb (128.4 kg)   SpO2 98%   BMI 35.37 kg/m²       CONSTITUTIONAL: NAD, A&O x 3  SENSATION: Intact to light touch throughout  RANGE OF MOTION: The patient has full passive range of motion in all four extremities. MOTOR:  Straight Leg Raise: Negative, bilateral   MAYUR SIGN: Positive, left  Increased tenderness to palpation to left SI joint. Hip Flex Knee Ext Knee Flex Ankle DF GTE Ankle PF Tone   Right +4/5 +4/5 +4/5 +4/5 +4/5 +4/5 +4/5   Left +4/5 +4/5 +4/5 +4/5 +4/5 +4/5 +4/5       ASSESSMENT   Diagnoses and all orders for this visit:    1. Sacroiliitis ([address]    2. Thoracic spondylosis without myelopathy    3. Lumbosacral spondylosis without myelopathy    4. Lumbar neuritis    5. Spinal stenosis of lumbar region without neurogenic claudication        IMPRESSION AND PLAN:  Upon reviewing his thoracic and lumbar CT myelograms, I do not appreciate any spinal pathology to account for his bowel incontinence. I recommend he f/u with a gastroenterologist for further evaluation and treatment of this. Patient reports he is scheduled to see his PCP tomorrow so I have recommended he address the referral at that time. Clinically, the patient demonstrates sxs that are consistent with left SI joint dysfunction. He wishes to proceed with left SI joint injection for diagnostic purposes. I recommend he continue to f/u with Dr. Jagdeep Jesus for pain management. Patient is neurologically intact. I will see the patient back following block. Written by Lawson Nelsoner, as dictated by Kulwinder Gutierrez MD  I examined the patient, reviewed and agree with the note.

## 2019-06-21 ENCOUNTER — OFFICE VISIT (OUTPATIENT)
Dept: FAMILY MEDICINE CLINIC | Age: 64
End: 2019-06-21

## 2019-06-21 VITALS
SYSTOLIC BLOOD PRESSURE: 147 MMHG | RESPIRATION RATE: 16 BRPM | HEART RATE: 70 BPM | TEMPERATURE: 96.2 F | OXYGEN SATURATION: 96 % | HEIGHT: 75 IN | BODY MASS INDEX: 35.06 KG/M2 | WEIGHT: 282 LBS | DIASTOLIC BLOOD PRESSURE: 92 MMHG

## 2019-06-21 DIAGNOSIS — R15.9 INCONTINENCE OF FECES, UNSPECIFIED FECAL INCONTINENCE TYPE: ICD-10-CM

## 2019-06-21 DIAGNOSIS — Z91.81 HISTORY OF RECENT FALL: ICD-10-CM

## 2019-06-21 DIAGNOSIS — E29.1 HYPOGONADISM MALE: ICD-10-CM

## 2019-06-21 DIAGNOSIS — M46.1 SACROILIITIS (HCC): ICD-10-CM

## 2019-06-21 DIAGNOSIS — S99.922D FOOT INJURY, LEFT, SUBSEQUENT ENCOUNTER: ICD-10-CM

## 2019-06-21 DIAGNOSIS — G43.909 MIGRAINE SYNDROME: ICD-10-CM

## 2019-06-21 DIAGNOSIS — F33.1 MODERATE EPISODE OF RECURRENT MAJOR DEPRESSIVE DISORDER (HCC): Primary | ICD-10-CM

## 2019-06-21 DIAGNOSIS — M48.062 SPINAL STENOSIS OF LUMBAR REGION WITH NEUROGENIC CLAUDICATION: ICD-10-CM

## 2019-06-21 RX ORDER — BUSPIRONE HYDROCHLORIDE 10 MG/1
10 TABLET ORAL 3 TIMES DAILY
Qty: 90 TAB | Refills: 0 | Status: SHIPPED | OUTPATIENT
Start: 2019-06-21 | End: 2019-07-26 | Stop reason: SDUPTHER

## 2019-06-21 RX ORDER — BUPROPION HYDROCHLORIDE 150 MG/1
150 TABLET, EXTENDED RELEASE ORAL DAILY
Qty: 90 TAB | Refills: 0 | Status: SHIPPED | OUTPATIENT
Start: 2019-06-21 | End: 2019-10-01 | Stop reason: SDUPTHER

## 2019-06-21 RX ORDER — BUPROPION HYDROCHLORIDE 150 MG/1
150 TABLET, EXTENDED RELEASE ORAL DAILY
COMMUNITY
End: 2019-06-21 | Stop reason: SDUPTHER

## 2019-06-21 RX ORDER — TESTOSTERONE CYPIONATE 200 MG/ML
100 INJECTION INTRAMUSCULAR
Qty: 10 ML | Refills: 3 | Status: SHIPPED | OUTPATIENT
Start: 2019-06-21

## 2019-06-21 NOTE — PROGRESS NOTES
HISTORY OF PRESENT ILLNESS  Leta Marcelo is a 75 y.o. male. Patient mentions he is here today as he is not able to get his psychiatric medication being the buspar and Wellbutrin because he is in pain management. Patient was referred to another specialist and has not had an apt still. I have advised I will refill. He is currently under the care of pain management. He has also been having headaches. He would like to be on gabapentin for this and I have advised he will need to speak to pain management specialist. He has a procedure coming up and he has been coming off tylenol and this can be why he's having pain. Patient also mentions  He has been having left foot pain after he hit the table leg. I will order a x-ray today. He would also his testosterone script to be changed to 10 ml vial as this will be cheaper for him. Patient also mentions in December he had a fall and  He is currently seeing a orthopedic doctor for SI joint injection and suggested patient see a Gi specialist as he has been having bowel incontinence since after the fall. I have reviewed note from specialist.    Depression   The history is provided by the patient. This is a chronic problem. The problem occurs constantly. The problem has been gradually improving. Associated symptoms include headaches. Pertinent negatives include no chest pain, no abdominal pain and no shortness of breath. The symptoms are aggravated by stress. The symptoms are relieved by medications. Treatments tried: buspar and wellbutrin    Headache   The history is provided by the patient. This is a chronic problem. The problem occurs constantly. The problem has been gradually worsening. Associated symptoms include headaches. Pertinent negatives include no chest pain, no abdominal pain and no shortness of breath. The symptoms are aggravated by stress. The symptoms are relieved by medications.  Treatments tried: gabapentin    Toe Pain   The history is provided by the patient. This is a new problem. The current episode started more than 2 days ago. The problem occurs constantly. Associated symptoms include headaches. Pertinent negatives include no chest pain, no abdominal pain and no shortness of breath. The symptoms are aggravated by walking and exertion. Nothing relieves the symptoms. He has tried nothing for the symptoms. Review of Systems   Constitutional: Negative for chills, fever and malaise/fatigue. HENT: Negative for congestion, ear pain, hearing loss, sinus pain, sore throat and tinnitus. Eyes: Positive for blurred vision. Negative for double vision. Respiratory: Negative for cough, sputum production, shortness of breath, wheezing and stridor. Cardiovascular: Negative for chest pain, palpitations, claudication and leg swelling. Gastrointestinal: Negative for abdominal pain, constipation, diarrhea, nausea and vomiting. Genitourinary: Negative for dysuria, hematuria and urgency. Musculoskeletal: Positive for back pain, joint pain and neck pain. Neurological: Positive for dizziness and headaches. Negative for tingling, focal weakness and weakness. Endo/Heme/Allergies: Negative for environmental allergies. Psychiatric/Behavioral: Positive for depression. Negative for hallucinations, substance abuse and suicidal ideas. The patient is nervous/anxious. The patient does not have insomnia. Visit Vitals  BP (!) 147/92   Pulse 70   Temp 96.2 °F (35.7 °C) (Oral)   Resp 16   Ht 6' 3\" (1.905 m)   Wt 282 lb (127.9 kg)   SpO2 96%   BMI 35.25 kg/m²       Physical Exam   Constitutional: He is oriented to person, place, and time. He appears well-developed and well-nourished. No distress. HENT:   Head: Normocephalic and atraumatic. Right Ear: External ear normal.   Mouth/Throat: Oropharynx is clear and moist. No oropharyngeal exudate. Eyes: Pupils are equal, round, and reactive to light. EOM are normal. No scleral icterus.    Neck: Normal range of motion. No thyromegaly present. Cardiovascular: Normal rate, regular rhythm and normal heart sounds. Pulmonary/Chest: Breath sounds normal. No respiratory distress. He has no wheezes. Abdominal: Soft. Bowel sounds are normal. He exhibits no distension. There is no tenderness. Musculoskeletal: He exhibits tenderness. Lymphadenopathy:     He has no cervical adenopathy. Neurological: He is alert and oriented to person, place, and time. Psychiatric: He has a normal mood and affect. ASSESSMENT and PLAN  Depression :  -please continue current regimen and script has been sent to pharmacy     Migraine :  1) Can apply a cold compress on area of pain . 2) Use comfortable pillows for the neck and head to support. 3) Rest in a room with little or no sensory stimulation such as light , sound or odors. 4) Avoid stress   5) Adequate sleep hygiene. 6) Drink moderate amount of fluids and water , caffeine may even help. 7) You can even try OTC medications such as tylenol , NSAID'S, Excedrin migraine , etc.  8) Abortive therapy with medication as prescribed , please make sure you take this medication once it starts or when you feel it is about to come on. 9) Preventative treatment will also be discussed.     Left foot injury :  - x-ray today     Sacroiliitis :  - please follow up with specialist

## 2019-06-21 NOTE — PROGRESS NOTES
Chief Complaint   Patient presents with    Depression    Headache    Toe Pain     thinks he broke his little toe on his left foot     1. Have you been to the ER, urgent care clinic since your last visit? Hospitalized since your last visit? No    2. Have you seen or consulted any other health care providers outside of the [address] [place] [address] since your last visit? Include any pap smears or colon screening.  No

## 2019-06-24 ENCOUNTER — OFFICE VISIT (OUTPATIENT)
Dept: ORTHOPEDIC SURGERY | Age: 64
End: 2019-06-24

## 2019-06-24 ENCOUNTER — TELEPHONE (OUTPATIENT)
Dept: FAMILY MEDICINE CLINIC | Age: 64
End: 2019-06-24

## 2019-06-24 VITALS
HEART RATE: 56 BPM | BODY MASS INDEX: 35.14 KG/M2 | OXYGEN SATURATION: 96 % | WEIGHT: 282.6 LBS | DIASTOLIC BLOOD PRESSURE: 74 MMHG | HEIGHT: 75 IN | SYSTOLIC BLOOD PRESSURE: 127 MMHG

## 2019-06-24 DIAGNOSIS — S92.512A CLOSED DISPLACED FRACTURE OF PROXIMAL PHALANX OF LESSER TOE OF LEFT FOOT, INITIAL ENCOUNTER: Primary | ICD-10-CM

## 2019-06-24 NOTE — TELEPHONE ENCOUNTER
Called patient in regards to his recent x-ray of the left toe and he mentions his spine specialist has a partner who treats feet. Patient mentions he will get in touch with this specialist and see if an apt can be made. If a referral is needed patient will call back and inform. He also has my chart and can see his report.

## 2019-06-24 NOTE — PROGRESS NOTES
AMBULATORY PROGRESS NOTE      Patient: Michelle Saul             MRN: [id number]     SSN: [id number] Body mass index is 35.32 kg/m². YOB: 1955     AGE: 75 y.o. EX: male    PCP: Sujey Marcus MD     IMPRESSION/DIAGNOSIS AND TREATMENT PLAN     DIAGNOSES  1. Closed displaced fracture of proximal phalanx of lesser toe of left foot, initial encounter        Orders Placed This Encounter    2020 [facility] Manda Kurtz understands his diagnoses and the proposed plan. Plan:    1) Efrain taping of the left #4 and #5 toes. 2) DME Order: Left hard sole shoe. 3) Continue activity modification as directed. 4) Obtain OTC Lamisil cream as directed. RTO - 3 weeks // PLEASE OBTAIN X-RAYS OF: left foot 3 VIEWS      HPI AND EXAMINATION     Michelle Saul IS A 75 y.o. male who presents to my outpatient office complaining of left foot pain. Mr. Charu Delgado reports that last week, he was walking in his house when his left 5th toe got caught on a coffee table, causing his toe to separate from the rest of the toes. He followed up with his PCP, Dr. Jessica Dukes, who took x-rays of his foot. XR imaging has been reviewed with the patient. Date of injury: 06/18/2019. Visit Vitals  /74   Pulse (!) 56   Ht 6' 3\" (1.905 m)   Wt 282 lb 9.6 oz (128.2 kg)   SpO2 96%   BMI 35.32 kg/m²     Appearance: Alert, well appearing and pleasant patient who is in no distress, oriented to person, place/time, and who follows commands. This patient is accompanied in the examination room by his wife. Dementia: no dementia  Psychiatric: Affect and mood are appropriate. Patient arrives to office via: with assistive device: cane  HEENT: Head normocephalic & atraumatic.  Both pupils are round, non icteric sclera   Eye: EOM are intact and sclera are clear    Neck: ROM WNL and JVD neck is not present     Hearings Intact, does not require hearing aid device  Respiratory: Breathing is unlabored without accessory chest muscle use  Cardiovascular/Peripheral Vascular: Normal Pulses to each foot    ANKLE/FOOT left    Gait: uses cane  Tenderness: Mild tenderness to the #5 proximal phalanx. Cutaneous: Mild ecchymosis at the #2,3,4 toes at the PIP regions. Moisture to the fourth web space. Joint Motion: Not tested. Joint / Tendon Stability: No Ankle or Subtalar instability or joint laxity. No peroneal sublux ability or dislocation  Alignment: Forefoot, Midfoot, Hindfoot WNL. Neuro Motor/Sensory: NL/NL. Vascular: NL foot/ankle pulses. Lymphatics: No extremity lymphedema, No calf swelling, no tenderness to calf muscles. 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  buPROPion SR (WELLBUTRIN SR) 150 mg SR tablet Take 1 Tab by mouth daily.  testosterone cypionate (DEPOTESTOTERONE CYPIONATE) 200 mg/mL injection 0.5 mL by IntraMUSCular route every seven (7) days. Max Daily Amount: 100 mg.  furosemide (LASIX) 20 mg tablet     hydrOXYzine HCl (ATARAX) 50 mg tablet Take 50 mg by mouth three (3) times daily as needed for Itching.  ibuprofen (MOTRIN) 200 mg tablet Take  by mouth.     Needle, Disp, 21 G (HYPODERMIC NEEDLES) 21 gauge x 1\" ndle Inject 0.5ml  intramuscular route every 7 days    Syringe with Needle, Disp, (MONOJECT SAFETY SYRINGES) syrg To be used with medication    esomeprazole (NEXIUM) 20 mg capsule Take 2 Caps by mouth daily.  lubiPROStone (AMITIZA) 24 mcg capsule Take  by mouth. PRN    lisinopril (PRINIVIL, ZESTRIL) 40 mg tablet Take 40 mg by mouth daily.  tamsulosin (FLOMAX) 0.4 mg capsule Take 0.4 mg by mouth daily.  metoprolol tartrate (LOPRESSOR) 25 mg tablet Take 12.5 mg by mouth every morning. 25mg 1/2 in am    triamcinolone acetonide (KENALOG) 0.1 % topical cream Apply  to affected area once as needed for Skin Irritation. use thin layer    cholecalciferol (VITAMIN D3) 1,000 unit tablet Take  by mouth daily.  tiZANidine (ZANAFLEX) 4 mg capsule Take 4 mg by mouth two (2) times a day.  AMLODIPINE BESYLATE, BULK, Take 5 mg by mouth daily.  HYDROcodone-acetaminophen (NORCO)  mg tablet Take 1 Tab by mouth every twelve (12) hours as needed for Pain. Max Daily Amount: 2 Tabs.  amLODIPine (NORVASC) 5 mg tablet Take 1 Tab by mouth daily.  levothyroxine (SYNTHROID) 100 mcg tablet Take 1 Tab by mouth Daily (before breakfast).  albuterol (VENTOLIN HFA) 90 mcg/actuation inhaler Take 2 Puffs by inhalation every four (4) hours as needed for Wheezing.  acetaminophen (TYLENOL) 325 mg tablet Take 2 Tabs by mouth every six (6) hours as needed for Pain.  esomeprazole (NEXIUM) 40 mg capsule Take 1 Cap by mouth daily. No current facility-administered medications for this visit.       Allergies   Allergen Reactions    Codeine Rash    Codeine Hives    Doxycycline Other (comments)     Dark urine    Doxycycline Itching     Urine turned black    Topamax [Topiramate] Itching     Rash, itching    Topamax [Topiramate] Hives    Verapamil Other (comments)     tachycardia    Verapamil Palpitations     Past Surgical History:   Procedure Laterality Date    HX HEENT      Reconstructive surgery on face    HX KNEE REPLACEMENT      bilat knees    HX ORTHOPAEDIC      back surgery X4     Social History     Occupational History    Not on file   Tobacco Use    Smoking status: Former Smoker     Last attempt to quit:      Years since quittin.4    Smokeless tobacco: Never Used   Substance and Sexual Activity    Alcohol use: No     Frequency: Never    Drug use: No    Sexual activity: Never     Partners: Female     Family History   Problem Relation Age of Onset    Arthritis-osteo Mother     Bleeding Prob Mother     Cancer Mother     Headache Mother     Hypertension Mother     Migraines Mother     Stroke Mother     Arthritis-osteo Father     Arthritis-osteo Sister     Cancer Sister     Migraines Sister     Arthritis-osteo Brother     Cancer Brother     Headache Brother     Migraines Brother     Stroke Paternal Uncle     Diabetes Other         REVIEW OF SYSTEMS : 2019  ALL BELOW ARE Negative except : SEE HPI     Constitutional: Negative for fever, chills and weight loss. Neg Weight Loss  Cardiovascular: Negative for chest pain, claudication and leg swelling. SOB, ROSALES   Gastrointestinal/Urological: Negative for  pain, N/V/D/C, Blood in stool or urine,dysuria                         Hematuria, Incontinence, pelvic pain  Musculoskeletal: see HPI. Neurological: Negative for dizziness and weakness, headaches,Visual Changes             Confusion,  Or Seizures,   Psychiatric/Behavioral: Negative for depression, memory loss and substance abuse. Extremities:  Negative for hair changes, rash or skin lesion changes. Hematologic: Negative for Bleeding problems, bruising, pallor or swollen lymph nodes. Peripheral Vascular: No calf pain, vascular vein tenderness to calf pain              No calf throbbing, posterior knee throbbing pain     DIAGNOSTIC IMAGING     XR Results (most recent):  Results from Appointment encounter on 19   XR FOOT LT AP/LAT    Narrative EXAM: Left foot    INDICATION:  Left foot pain injury    COMPARISON:  None.     FINDINGS: 2 separate views of the left foot demonstrate a minimally displaced  fracture involving the base of the proximal phalanx left fifth toe extending to  the fifth MTP joint. There is mild diffuse DJD particularly along the  interphalangeal as well as the first MTP joints. Anterior calcaneal spur noted. Impression IMPRESSION:    Minimally displaced intra-articular fracture base of proximal phalanx left fifth  toe. Written by Tami Wise, as dictated by Dr. Patricia Gregg. I, Dr. Patricia Gregg, confirm that all documentation is accurate.

## 2019-06-24 NOTE — PROGRESS NOTES
Verbal order given by Dr. Lolita Castle to sign order for hard sole shoe entered by [facility].

## 2019-06-24 NOTE — TELEPHONE ENCOUNTER
Pt requesting a copy of xray of foot. [facility] has an appt with Dr Soto Jean-Baptiste today @ 1:10pm. States was told will need a copy of film. Advised can put on CD. States please cll once ready for p/u.

## 2019-06-24 NOTE — PATIENT INSTRUCTIONS
Obtain over-the-counter Lamisil cream for the fungal infection in the 4th toe web space     Broken Toe: Care Instructions  Your Care Instructions  You have broken (fractured) a bone in your toe. This kind of fracture does not need a special cast or brace. \"Merlyn-taping\" your broken toe to a healthy toe next to it is almost always enough to treat the problem and ease symptoms. The toe may take 4 weeks or more to heal.  You heal best when you take good care of yourself. Eat a variety of healthy foods, and don't smoke. Follow-up care is a key part of your treatment and safety. Be sure to make and go to all appointments, and call your doctor if you are having problems. It's also a good idea to know your test results and keep a list of the medicines you take. How can you care for yourself at home? · Be safe with medicines. Take pain medicines exactly as directed. ? If the doctor gave you a prescription medicine for pain, take it as prescribed. ? If you are not taking a prescription pain medicine, ask your doctor if you can take an over-the-counter medicine. · If your toe is taped to the toe next to it, your doctor has shown you how to change the tape. Protect the skin by putting something soft, such as felt or foam, between your toes before you tape them together. Never tape the toes together skin-to-skin. Your broken toe may need to be merlyn-taped for 2 to 4 weeks to heal.  · Rest and protect your toe. Do not walk on it until you can do so without too much pain. If the doctor has told you to use crutches, use them as instructed. · Put ice or a cold pack on your toe for 10 to 20 minutes at a time. Try to do this every 1 to 2 hours for the next 3 days (when you are awake) or until the swelling goes down. Put a thin cloth between the ice and your skin. · Prop up your foot on a pillow when you ice it or anytime you sit or lie down. Try to keep it above the level of your heart. This will help reduce swelling.   · Make sure you go to your follow-up appointments. Your doctor will need to check that your toe is healing right. When should you call for help? Call your doctor now or seek immediate medical care if:    · You have severe pain.     · Your toe is cool or pale or changes color.     · You have tingling, weakness, or numbness in your toe.    Watch closely for changes in your health, and be sure to contact your doctor if:    · Pain and swelling get worse.     · You are not getting better as expected. Where can you learn more? Go to http://jamel-erika.info/. Enter K861 in the search box to learn more about \"Broken Toe: Care Instructions. \"  Current as of: September 20, 2018  Content Version: 11.9  © 3235-0923 [employer]. Care instructions adapted under license by [employer] (which disclaims liability or warranty for this information). If you have questions about a medical condition or this instruction, always ask your healthcare professional. [place] [address] any warranty or liability for your use of this information.

## 2019-06-27 ENCOUNTER — APPOINTMENT (OUTPATIENT)
Dept: GENERAL RADIOLOGY | Age: 64
End: 2019-06-27
Attending: PHYSICAL MEDICINE & REHABILITATION
Payer: MEDICARE

## 2019-06-27 ENCOUNTER — HOSPITAL ENCOUNTER (OUTPATIENT)
Age: 64
Setting detail: OUTPATIENT SURGERY
Discharge: HOME | End: 2019-06-27
Attending: PHYSICAL MEDICINE & REHABILITATION | Admitting: PHYSICAL MEDICINE & REHABILITATION
Payer: MEDICARE

## 2019-06-27 VITALS
RESPIRATION RATE: 16 BRPM | WEIGHT: 282 LBS | HEART RATE: 74 BPM | BODY MASS INDEX: 35.06 KG/M2 | TEMPERATURE: 97.9 F | SYSTOLIC BLOOD PRESSURE: 149 MMHG | HEIGHT: 75 IN | DIASTOLIC BLOOD PRESSURE: 94 MMHG | OXYGEN SATURATION: 95 %

## 2019-06-27 PROCEDURE — 76010000009 HC PAIN MGT 0 TO 30 MIN PROC: Performed by: PHYSICAL MEDICINE & REHABILITATION

## 2019-06-27 PROCEDURE — 77030039433 HC TY MYLEOGRAM BD -B: Performed by: PHYSICAL MEDICINE & REHABILITATION

## 2019-06-27 PROCEDURE — 74011250636 HC RX REV CODE- 250/636: Performed by: PHYSICAL MEDICINE & REHABILITATION

## 2019-06-27 PROCEDURE — 77030003676 HC NDL SPN MPRI -A: Performed by: PHYSICAL MEDICINE & REHABILITATION

## 2019-06-27 RX ORDER — DEXAMETHASONE SODIUM PHOSPHATE 100 MG/10ML
INJECTION INTRAMUSCULAR; INTRAVENOUS AS NEEDED
Status: DISCONTINUED | OUTPATIENT
Start: 2019-06-27 | End: 2019-06-27 | Stop reason: HOSPADM

## 2019-06-27 RX ORDER — DIAZEPAM 5 MG/1
5-20 TABLET ORAL ONCE
Status: DISCONTINUED | OUTPATIENT
Start: 2019-06-27 | End: 2019-06-27 | Stop reason: HOSPADM

## 2019-06-27 RX ORDER — LIDOCAINE HYDROCHLORIDE 10 MG/ML
INJECTION, SOLUTION EPIDURAL; INFILTRATION; INTRACAUDAL; PERINEURAL AS NEEDED
Status: DISCONTINUED | OUTPATIENT
Start: 2019-06-27 | End: 2019-06-27 | Stop reason: HOSPADM

## 2019-06-27 NOTE — DISCHARGE INSTRUCTIONS
[facility]   ([place])  Dr. Marlon Cloud, Dr. Genaro Mcallister, Dr. Bazzi Province not drive a car, operate heavy machinery or dangerous equipment for 24 hours. * Activity as tolerated; rest for the remainder of the day. * Resume pre-block medications including those for your family doctor. * Do not drink alcoholic beverages for 24 hours. Alcohol and the medications you have received may interact and cause an adverse reaction. * You may feel better this evening and worse tomorrow, as the numbing medications wears off and the steroid has yet to begin to work. After 48 hrs the steroid should begin to release bringing you relief. * You may shower this evening and remove any bandages. * Avoid hot tubs and heating pads for 24 hours. You may use cold packs on the procedure site as tolerated for the first 24 hours. * If a headache develops, drink plenty of fluids and rest.  Take over the counter medications for headache if needed. If the headache continues longer than 24 hours, call MD at the [address] [place] [address]. [phone]    * Continue taking pain medications as needed. * You may resume your regular diet if tolerated. Otherwise, start with sips of water and advance slowly. * If Diabetic: check your blood sugar three times a day for the next 3 days. If your sugar is greater than 300 call your family doctor. If your sugar is greater than 400, have someone transport you to the nearest Emergency Room. * If you experience any of the following problems, Please Call the [address] [place] [address] at [phone].         * Shortness of Breath    * Fever of 101 or higher    * Nausea / Vomiting    * Severe Headache    * Weakness or numbness in arms or legs that is not      resolving    * Prolonged increase in pain greater than 4 days      DISCHARGE SUMMARY from Nurse      PATIENT INSTRUCTIONS:    After oral sedation, for 24 hours or while taking prescription Narcotics:  · Limit your activities  · Do not drive and operate hazardous machinery  · Do not make important personal or business decisions  · Do  not drink alcoholic beverages  · If you have not urinated within 8 hours after discharge, please contact your surgeon on call. Report the following to your surgeon:  · Excessive pain, swelling, redness or odor of or around the surgical area  · Temperature over 101  · Nausea and vomiting lasting longer than 4 hours or if unable to take medications  · Any signs of decreased circulation or nerve impairment to extremity: change in color, persistent  numbness, tingling, coldness or increase pain  · Any questions            What to do at Home:  Recommended activity: Activity as tolerated, NO DRIVING FOR 12 Hours post injection          *  Please give a list of your current medications to your Primary Care Provider. *  Please update this list whenever your medications are discontinued, doses are      changed, or new medications (including over-the-counter products) are added. *  Please carry medication information at all times in case of emergency situations. These are general instructions for a healthy lifestyle:    No smoking/ No tobacco products/ Avoid exposure to second hand smoke    Surgeon General's Warning:  Quitting smoking now greatly reduces serious risk to your health. Obesity, smoking, and sedentary lifestyle greatly increases your risk for illness    A healthy diet, regular physical exercise & weight monitoring are important for maintaining a healthy lifestyle    You may be retaining fluid if you have a history of heart failure or if you experience any of the following symptoms:  Weight gain of 3 pounds or more overnight or 5 pounds in a week, increased swelling in our hands or feet or shortness of breath while lying flat in bed.   Please call your doctor as soon as you notice any of these symptoms; do not wait until your next office visit. Recognize signs and symptoms of STROKE:    F-face looks uneven    A-arms unable to move or move unevenly    S-speech slurred or non-existent    T-time-call 911 as soon as signs and symptoms begin-DO NOT go       Back to bed or wait to see if you get better-TIME IS BRAIN.

## 2019-06-27 NOTE — PROCEDURES
Procedure Note    Patient Name: Tino Andre    Date of Procedure: June 27, 2019    Preoperative Diagnosis: Sacrilitis    Post Operative Diagnosis: same    Procedure: SI Joint Injection left     Consent: Informed consent was obtained prior to the procedure. The patient was given the opportunity to ask questions regarding the procedure and its associated risks. In addition to the potential risks associated with the procedure itself, the patient was informed both verbally and in writing of potential side effects of the use of glucocorticoids. The patient appeared to comprehend the informed consent and desired to have the procedure perfored. Procedure: The patient was placed in the prone position on the flouroscopy table and the back was prepped and draped in the usual sterile manner. A #22 gauge spinal needle was then advanced to lie within the SI joint after local Lidocaine 1% injection. A total of 30 mg of preservative free Dexamethasone and 4 cc of Lidocaine was introduced into the SI joint. The injection area was cleaned and bandaids applied. No excessive bleeding was noted. Patient dressed and was discharged to home with instructions. Discussion: The patient tolerated the procedure well.      Gabi Rivas MD  June 27, 2019

## 2019-07-15 ENCOUNTER — OFFICE VISIT (OUTPATIENT)
Dept: ORTHOPEDIC SURGERY | Age: 64
End: 2019-07-15

## 2019-07-15 VITALS
HEIGHT: 75 IN | HEART RATE: 51 BPM | TEMPERATURE: 98 F | WEIGHT: 288 LBS | SYSTOLIC BLOOD PRESSURE: 123 MMHG | BODY MASS INDEX: 35.81 KG/M2 | RESPIRATION RATE: 12 BRPM | OXYGEN SATURATION: 93 % | DIASTOLIC BLOOD PRESSURE: 69 MMHG

## 2019-07-15 DIAGNOSIS — S92.512D CLOSED DISPLACED FRACTURE OF PROXIMAL PHALANX OF LESSER TOE OF LEFT FOOT WITH ROUTINE HEALING, SUBSEQUENT ENCOUNTER: Primary | ICD-10-CM

## 2019-07-15 NOTE — PROGRESS NOTES
AMBULATORY PROGRESS NOTE      Patient: Saskia Bell             MRN: [id number]     SSN: [id number] Body mass index is 36 kg/m². YOB: 1955     AGE: 75 y.o. EX: male    PCP: Diana Looney MD     IMPRESSION/DIAGNOSIS AND TREATMENT PLAN     DIAGNOSES  1. Closed displaced fracture of proximal phalanx of lesser toe of left foot with routine healing, subsequent encounter        Orders Placed This Encounter    [[id number]] [id number] Akira Kirby understands his diagnoses and the proposed plan. He has some slight tenderness to the fifth metatarsal distal one-third and to the proximal phalanx of the left fifth toe. X-rays show a healing left fifth toe proximal phalangeal fracture with excellent alignment. Plan is for PRN followup. Should his symptoms worsen, of course we will see him sooner. He is going to continue wearing comfortable shoes. Plan:    1) Continue wearing supportive shoes as directed. 2) Continue activity modification as directed for the next 3-4 weeks. RTO - PRN     HPI AND EXAMINATION     Saskia Bell IS A 75 y.o. male who presents to my outpatient office for follow up of a closed displaced fracture of the proximal phalanx of the lesser toe of the left foot. At the last visit, I merlyn taped the left #4 and #5 toes, provided an order for a left hard sole shoe, instructed the patient to continue activity modification as directed, and to obtain OTC Lamisil cream as directed. Date of injury: 06/18/2019. Since we saw him last, Mr. Inessa Live states that he is doing well. He notes that he has been experiencing tenderness along his 5th metatarsal. XR imaging has been reviewed with the patient. He is accompanied to the office today by his wife.      Visit Vitals  /69   Pulse (!) 51   Temp 98 °F (36.7 °C) (Oral)   Resp 12   Ht 6' 3\" (1.905 m)   Wt 288 lb (130.6 kg)   SpO2 93%   BMI 36.00 kg/m²     Appearance: Alert, well appearing and pleasant patient who is in no distress, oriented to person, place/time, and who follows commands. This patient is accompanied in the examination room by his wife. Dementia: no dementia  Psychiatric: Affect and mood are appropriate. Patient arrives to office via: with assistive device: cane  HEENT: Head normocephalic & atraumatic. Both pupils are round, non icteric sclera   Eye: EOM are intact and sclera are clear    Neck: ROM WNL and JVD neck is not present     Hearings Intact, does not require hearing aid device  Respiratory: Breathing is unlabored without accessory chest muscle use  Cardiovascular/Peripheral Vascular: Normal Pulses to each foot    ANKLE/FOOT left    Gait: normal  Tenderness: Mild tenderness to the #5 proximal phalanx. Mild tenderness to the distal 1/3 5th metatarsal.   Cutaneous: Mild ecchymosis at the #2,3,4 toes at the PIP regions. Moisture to the fourth web space. Joint Motion: Not tested. Joint / Tendon Stability: No Ankle or Subtalar instability or joint laxity. No peroneal sublux ability or dislocation  Alignment: Forefoot, Midfoot, Hindfoot WNL. Neuro Motor/Sensory: NL/NL. Vascular: NL foot/ankle pulses. Lymphatics: No extremity lymphedema, No calf swelling, no tenderness to calf muscles. 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     buPROPion [facility]) 150 mg SR tablet Take 1 Tab by mouth daily.  testosterone cypionate (DEPOTESTOTERONE CYPIONATE) 200 mg/mL injection 0.5 mL by IntraMUSCular route every seven (7) days. Max Daily Amount: 100 mg.  ibuprofen (MOTRIN) 200 mg tablet Take  by mouth.  Needle, Disp, 21 G (HYPODERMIC NEEDLES) 21 gauge x 1\" ndle Inject 0.5ml  intramuscular route every 7 days    Syringe with Needle, Disp, (MONOJECT SAFETY SYRINGES) syrg To be used with medication    esomeprazole (NEXIUM) 40 mg capsule Take 1 Cap by mouth daily.  esomeprazole (NEXIUM) 20 mg capsule Take 2 Caps by mouth daily.  lubiPROStone (AMITIZA) 24 mcg capsule Take  by mouth. PRN    lisinopril (PRINIVIL, ZESTRIL) 40 mg tablet Take 40 mg by mouth daily.  tamsulosin (FLOMAX) 0.4 mg capsule Take 0.4 mg by mouth daily.  metoprolol tartrate (LOPRESSOR) 25 mg tablet Take 12.5 mg by mouth every morning. 25mg 1/2 in am    triamcinolone acetonide (KENALOG) 0.1 % topical cream Apply  to affected area once as needed for Skin Irritation. use thin layer    tiZANidine (ZANAFLEX) 4 mg capsule Take 4 mg by mouth two (2) times a day.  AMLODIPINE BESYLATE, BULK, Take 5 mg by mouth daily.  HYDROcodone-acetaminophen (NORCO)  mg tablet Take 1 Tab by mouth every twelve (12) hours as needed for Pain. Max Daily Amount: 2 Tabs.  amLODIPine (NORVASC) 5 mg tablet Take 1 Tab by mouth daily.  levothyroxine (SYNTHROID) 100 mcg tablet Take 1 Tab by mouth Daily (before breakfast).  albuterol (VENTOLIN HFA) 90 mcg/actuation inhaler Take 2 Puffs by inhalation every four (4) hours as needed for Wheezing.  acetaminophen (TYLENOL) 325 mg tablet Take 2 Tabs by mouth every six (6) hours as needed for Pain.  furosemide (LASIX) 20 mg tablet     hydrOXYzine HCl (ATARAX) 50 mg tablet Take 50 mg by mouth three (3) times daily as needed for Itching.  cholecalciferol (VITAMIN D3) 1,000 unit tablet Take  by mouth daily.      No current facility-administered medications for this visit. Allergies   Allergen Reactions    Codeine Rash    Codeine Hives    Doxycycline Other (comments)     Dark urine    Doxycycline Itching     Urine turned black    Topamax [Topiramate] Itching     Rash, itching    Topamax [Topiramate] Hives    Verapamil Other (comments)     tachycardia    Verapamil Palpitations     Past Surgical History:   Procedure Laterality Date    HX HEENT      Reconstructive surgery on face    HX KNEE REPLACEMENT      bilat knees    HX ORTHOPAEDIC      back surgery X4     Social History     Occupational History    Not on file   Tobacco Use    Smoking status: Former Smoker     Last attempt to quit:      Years since quittin.5    Smokeless tobacco: Never Used   Substance and Sexual Activity    Alcohol use: No     Frequency: Never    Drug use: No    Sexual activity: Never     Partners: Female     Family History   Problem Relation Age of Onset    Arthritis-osteo Mother     Bleeding Prob Mother     Cancer Mother     Headache Mother     Hypertension Mother     Migraines Mother     Stroke Mother     Arthritis-osteo Father     Arthritis-osteo Sister     Cancer Sister     Migraines Sister     Arthritis-osteo Brother     Cancer Brother     Headache Brother     Migraines Brother     Stroke Paternal Uncle     Diabetes Other         REVIEW OF SYSTEMS : 7/15/2019  ALL BELOW ARE Negative except : SEE HPI     Constitutional: Negative for fever, chills and weight loss. Neg Weight Loss  Cardiovascular: Negative for chest pain, claudication and leg swelling. SOB, ROSALES   Gastrointestinal/Urological: Negative for  pain, N/V/D/C, Blood in stool or urine,dysuria                         Hematuria, Incontinence, pelvic pain  Musculoskeletal: see HPI. Neurological: Negative for dizziness and weakness, headaches,Visual Changes             Confusion,  Or Seizures,   Psychiatric/Behavioral: Negative for depression, memory loss and substance abuse. Extremities:  Negative for hair changes, rash or skin lesion changes. Hematologic: Negative for Bleeding problems, bruising, pallor or swollen lymph nodes. Peripheral Vascular: No calf pain, vascular vein tenderness to calf pain              No calf throbbing, posterior knee throbbing pain     DIAGNOSTIC IMAGING     No notes on file    Please see above section of this report. I have personally reviewed the results of the above study. The interpretation of this study is my professional opinion. Written by Fautsina Peacock, as dictated by Dr. Jeane Pugh. I, Dr. Jeane Pugh, confirm that all documentation is accurate.

## 2019-07-15 NOTE — PROGRESS NOTES
1. Have you been to the ER, urgent care clinic since your last visit? Hospitalized since your last visit? No    2. Have you seen or consulted any other health care providers outside of the [address] [place] [address] since your last visit? Include any pap smears or colon screening.  No

## 2019-07-15 NOTE — PATIENT INSTRUCTIONS
Broken Toe: Care Instructions Your Care Instructions You have broken (fractured) a bone in your toe. This kind of fracture does not need a special cast or brace. \"Merlyn-taping\" your broken toe to a healthy toe next to it is almost always enough to treat the problem and ease symptoms. The toe may take 4 weeks or more to heal. 
You heal best when you take good care of yourself. Eat a variety of healthy foods, and don't smoke. Follow-up care is a key part of your treatment and safety. Be sure to make and go to all appointments, and call your doctor if you are having problems. It's also a good idea to know your test results and keep a list of the medicines you take. How can you care for yourself at home? · Be safe with medicines. Take pain medicines exactly as directed. ? If the doctor gave you a prescription medicine for pain, take it as prescribed. ? If you are not taking a prescription pain medicine, ask your doctor if you can take an over-the-counter medicine. · If your toe is taped to the toe next to it, your doctor has shown you how to change the tape. Protect the skin by putting something soft, such as felt or foam, between your toes before you tape them together. Never tape the toes together skin-to-skin. Your broken toe may need to be merlyn-taped for 2 to 4 weeks to heal. 
· Rest and protect your toe. Do not walk on it until you can do so without too much pain. If the doctor has told you to use crutches, use them as instructed. · Put ice or a cold pack on your toe for 10 to 20 minutes at a time. Try to do this every 1 to 2 hours for the next 3 days (when you are awake) or until the swelling goes down. Put a thin cloth between the ice and your skin. · Prop up your foot on a pillow when you ice it or anytime you sit or lie down. Try to keep it above the level of your heart. This will help reduce swelling. · Make sure you go to your follow-up appointments.  Your doctor will need to check that your toe is healing right. When should you call for help? Call your doctor now or seek immediate medical care if: 
  · You have severe pain.  
  · Your toe is cool or pale or changes color.  
  · You have tingling, weakness, or numbness in your toe.  
 Watch closely for changes in your health, and be sure to contact your doctor if: 
  · Pain and swelling get worse.  
  · You are not getting better as expected. Where can you learn more? Go to http://jamel-erika.info/. Enter O837 in the search box to learn more about \"Broken Toe: Care Instructions. \" Current as of: September 20, 2018 Content Version: 11.9 © 7746-8051 [employer]. Care instructions adapted under license by [employer] (which disclaims liability or warranty for this information). If you have questions about a medical condition or this instruction, always ask your healthcare professional. [address] any warranty or liability for your use of this information.

## 2019-07-26 RX ORDER — BUSPIRONE HYDROCHLORIDE 10 MG/1
TABLET ORAL
Qty: 90 TAB | Refills: 0 | Status: SHIPPED | OUTPATIENT
Start: 2019-07-26 | End: 2019-08-28 | Stop reason: SDUPTHER

## 2019-07-30 ENCOUNTER — TELEPHONE (OUTPATIENT)
Dept: FAMILY MEDICINE CLINIC | Age: 64
End: 2019-07-30

## 2019-07-30 NOTE — TELEPHONE ENCOUNTER
Pt's wife called and stated her  has a large cyst on his back that burst in his sleep last night and wanted to schedule an same day appt with provider. There is no availability with Dr Maru Sesay for the next 3 days. I spoke [facility] [place] and she instructed me to relay that because of this she recommends going to an urgent care to be seen today. Pt's wife stated she understood and would do so.

## 2019-08-01 ENCOUNTER — OFFICE VISIT (OUTPATIENT)
Dept: ORTHOPEDIC SURGERY | Age: 64
End: 2019-08-01

## 2019-08-01 VITALS
SYSTOLIC BLOOD PRESSURE: 112 MMHG | HEIGHT: 75 IN | RESPIRATION RATE: 18 BRPM | TEMPERATURE: 97.4 F | HEART RATE: 63 BPM | DIASTOLIC BLOOD PRESSURE: 70 MMHG | WEIGHT: 288 LBS | BODY MASS INDEX: 35.81 KG/M2

## 2019-08-01 DIAGNOSIS — M96.1 LUMBAR POST-LAMINECTOMY SYNDROME: ICD-10-CM

## 2019-08-01 DIAGNOSIS — M48.061 SPINAL STENOSIS OF LUMBAR REGION WITHOUT NEUROGENIC CLAUDICATION: ICD-10-CM

## 2019-08-01 DIAGNOSIS — M46.1 SACROILIITIS (HCC): Primary | ICD-10-CM

## 2019-08-01 NOTE — PROGRESS NOTES
[facility]  [address], [place]   Phone: [phone]  Fax: [phone]        PROGRESS NOTE      HISTORY OF PRESENT ILLNESS:  The patient is a 75 y.o. male and was seen today for follow up of low back pain extending into the LLE in an S1 distribution to the knee, with recent exacerbation after a fall out of the back of a [employer]haul on 12/18/18. Pt reports bowel incontinence, with immediate onset following the incident. Denies bladder incontinence. Pt denies fever, weight loss, or skin changes. The patient is RHD. PmHx spinal surgery x 3, SCS implantation [employer]), squamous cell carcinoma. Pt is currently being followed by Dr. Arnol Hughes for chronic pain management. Note from Betsy Taylor MD dated 3/7/19 indicating patient was seen with c/o left hip and groin pain. Noted, pt is being followed by Dr. Arnol Hughes for pain management. Note from Dr. Jennifer Rosas dated 3/22/19 indicating patient was seen with c/o left hip pain since an accident on 12/18/18 when he fell out of the back of a Uhaul. He landed hard on his buttocks and has been experiencing pain ever since. Performed a left bursa injection at that time. Of note, pt has had spinal surgeries x 4 and had severe bleeding complications with his last surgery. Preliminary reading of lumbar plain films: SCS noted in the right lower quadrant with leads extending into the spinal column. Posterior fusion L2 through S1, with posterior hardware extending from L2 through L5. Hardware appears to be intact. Moderate degenerative changes noted extending from L1 to the lower thoracic spine. Thoracic spine CT myelogram dated 5/29/19 reviewed. Per report, mild spinal canal narrowing at T10-11 due to facet arthropathy and ligamentum flavum hypertrophy. Moderate left foraminal narrowing at T3-4 due to a left facet arthropathy. Mild spondylosis of the remaining thoracic spine with no significant stenosis.  Spinal cord stimulator electrodes at T8. Lumbar spine CT myelogram dated 5/29/19 reviewed. Per report, postoperative changes from L2 to S1 from posterior spinal decompression and fusion. The central canal and recesses are well decompressed at these levels. Moderate central canal and right foraminal narrowing at L1-L2 due to a disc bulge and facet arthropathy. Findings consistent with arachnoiditis. Nonobstructing right renal calculus. At his last clinical appointment, upon reviewing his thoracic and lumbar CT myelograms, I did not appreciate any spinal pathology to account for his bowel incontinence. I recommended he f/u with a gastroenterologist for further evaluation and treatment of this. Patient reported he was scheduled to see his PCP 6/21/19 so I recommended he address the referral at that time. Clinically, the patient demonstrated sxs that were consistent with left SI joint dysfunction. He wished to proceed with left SI joint injection for diagnostic purposes. I recommended he continue to f/u with Dr. Kortney Coleman for pain management. The patient returns today with pain location and distribution remain unchanged. He rates his pain 5/10, previously 6/10. Despite numbers, he states his pain has been doing better. Patient reports he had a fall on 7/20/19 from bumping into a chair  that caused an increase in pain. F/u with his PCP regarding the bladder incontinence, referred him to a gastroenterologist, appointment pending. He continues to report bowel incontinence x 7 months. Pt underwent left SI joint injection on 6/27/19 with benefit x 1 week (80% improvement). Note from Dr. Enid Fiore dated 7/15/19 indicating patient was seen with c/o closed displaced fracture of proximal phalanx of lesser toe of left foot with routine healing, subsequent encounter       reviewed. Body mass index is 36 kg/m².       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    Cancer ([facility] [address]     Depression     Endocrine disease     Hypothyroidism    Fractures ,     ankle left, left wrist    Headache     Hearing loss     Hypertension     Ill-defined condition     Pain stimulator in back    Ill-defined condition     irregular heartbeat    Psychiatric disorder     depression    Thyroid disease         Social History     Socioeconomic History    Marital status:      Spouse name: Not on file    Number of children: Not on file    Years of education: Not on file    Highest education level: Not on file   Occupational History    Not on file   Social Needs    Financial resource strain: Not on file    Food insecurity:     Worry: Not on file     Inability: Not on file    Transportation needs:     Medical: Not on file     Non-medical: Not on file   Tobacco Use    Smoking status: Former Smoker     Last attempt to quit:      Years since quittin.6    Smokeless tobacco: Never Used   Substance and Sexual Activity    Alcohol use: No     Frequency: Never    Drug use: No    Sexual activity: Never     Partners: Female   Lifestyle    Physical activity:     Days per week: Not on file     Minutes per session: Not on file    Stress: Not on file   Relationships    Social connections:     Talks on phone: Not on file     Gets together: Not on file     Attends [employer] service: Not on file     Active member of club or organization: Not on file     Attends meetings of clubs or organizations: Not on file     Relationship status: Not on file    Intimate partner violence:     Fear of current or ex partner: Not on file     Emotionally abused: Not on file     Physically abused: Not on file     Forced sexual activity: Not on file   Other Topics Concern    Not on file   Social History Narrative    ** Merged History Encounter **            Current Outpatient Medications   Medication Sig Dispense Refill    sulfamethoxazole/trimethoprim (BACTRIM DS PO) Take  by mouth.  busPIRone (BUSPAR) 10 mg tablet TAKE 1 TABLET BY MOUTH THREE TIMES DAILY 90 Tab 0    buPROPion SR (WELLBUTRIN SR) 150 mg SR tablet Take 1 Tab by mouth daily. 90 Tab 0    testosterone cypionate (DEPOTESTOTERONE CYPIONATE) 200 mg/mL injection 0.5 mL by IntraMUSCular route every seven (7) days. Max Daily Amount: 100 mg. 10 mL 3    ibuprofen (MOTRIN) 200 mg tablet Take  by mouth.  Needle, Disp, 21 G (HYPODERMIC NEEDLES) 21 gauge x 1\" ndle Inject 0.5ml  intramuscular route every 7 days 10 Pen Needle 5    Syringe with Needle, Disp, (MONOJECT SAFETY SYRINGES) syrg To be used with medication 30 Syringe 3    esomeprazole (NEXIUM) 40 mg capsule Take 1 Cap by mouth daily. 180 Cap 3    lubiPROStone (AMITIZA) 24 mcg capsule Take  by mouth. PRN      lisinopril (PRINIVIL, ZESTRIL) 40 mg tablet Take 40 mg by mouth daily.  tamsulosin (FLOMAX) 0.4 mg capsule Take 0.4 mg by mouth daily.  metoprolol tartrate (LOPRESSOR) 25 mg tablet Take 12.5 mg by mouth every morning. 25mg 1/2 in am      triamcinolone acetonide (KENALOG) 0.1 % topical cream Apply  to affected area once as needed for Skin Irritation. use thin layer      tiZANidine (ZANAFLEX) 4 mg capsule Take 4 mg by mouth two (2) times a day.  AMLODIPINE BESYLATE, BULK, Take 5 mg by mouth daily.  HYDROcodone-acetaminophen (NORCO)  mg tablet Take 1 Tab by mouth every twelve (12) hours as needed for Pain. Max Daily Amount: 2 Tabs. 60 Tab 0    amLODIPine (NORVASC) 5 mg tablet Take 1 Tab by mouth daily. 90 Tab 3    levothyroxine (SYNTHROID) 100 mcg tablet Take 1 Tab by mouth Daily (before breakfast). 90 Tab 3    albuterol (VENTOLIN HFA) 90 mcg/actuation inhaler Take 2 Puffs by inhalation every four (4) hours as needed for Wheezing. 1 Inhaler 0    acetaminophen (TYLENOL) 325 mg tablet Take 2 Tabs by mouth every six (6) hours as needed for Pain.  20 Tab 0    furosemide (LASIX) 20 mg tablet       hydrOXYzine HCl (ATARAX) 50 mg tablet Take 50 mg by mouth three (3) times daily as needed for Itching.  esomeprazole (NEXIUM) 20 mg capsule Take 2 Caps by mouth daily. 90 Cap 3    cholecalciferol (VITAMIN D3) 1,000 unit tablet Take  by mouth daily. Allergies   Allergen Reactions    Codeine Rash    Codeine Hives    Doxycycline Other (comments)     Dark urine    Doxycycline Itching     Urine turned black    Topamax [Topiramate] Itching     Rash, itching    Topamax [Topiramate] Hives    Verapamil Other (comments)     tachycardia    Verapamil Palpitations          PHYSICAL EXAMINATION    Visit Vitals  /70   Pulse 63   Temp 97.4 °F (36.3 °C) (Oral)   Resp 18   Ht 6' 3\" (1.905 m)   Wt 288 lb (130.6 kg)   BMI 36.00 kg/m²       CONSTITUTIONAL: NAD, A&O x 3  SENSATION: Intact to light touch throughout  RANGE OF MOTION: The patient has full passive range of motion in all four extremities. MOTOR:  Straight Leg Raise: Negative, bilateral               Hip Flex Knee Ext Knee Flex Ankle DF GTE Ankle PF Tone   Right +4/5 +4/5 +4/5 +4/5 +4/5 +4/5 +4/5   Left +4/5 +4/5 +4/5 +4/5 +4/5 +4/5 +4/5       ASSESSMENT   Diagnoses and all orders for this visit:    1. Sacroiliitis ([address]    2. Spinal stenosis of lumbar region without neurogenic claudication    3. Lumbar post-laminectomy syndrome          IMPRESSION AND PLAN:  Patient is s/p left SI joint injection, noting benefit (80% improvement). Patient wished to continue his current treatment. I recommend he continue to f/u with Dr. Dc Mayers for pain management. Patient is neurologically intact. I will see the patient back prn. Written by Lesly Hallman, as dictated by Evans Garza MD  I examined the patient, reviewed and agree with the note.

## 2019-08-01 NOTE — LETTER
8/1/19 Patient: Joan Payne YOB: 1955 Date of Visit: 8/1/2019 Timoteo Choudhary MD 
[address] [place] [address] [address] [place] [id number] VIA In Basket Dear Timoteo Choudhary MD, Thank you for referring Mr. Mick Sierra to [address] [place] [address] for evaluation. My notes for this consultation are attached. If you have questions, please do not hesitate to call me. I look forward to following your patient along with you. Sincerely, Berna Garcia MD

## 2019-08-01 NOTE — PROGRESS NOTES
19 Lizz Viveros is a 75 y.o. male is here for   Chief Complaint   Patient presents with    Back Pain    Follow-up   . Vitals:    19 0849   BP: 112/70   Pulse: 63   Resp: 18   Temp: 97.4 °F (36.3 °C)   TempSrc: Oral   Weight: 288 lb (130.6 kg)   Height: 6' 3\" (1.905 m)   PainSc:   5   PainLoc: Back    Body mass index is 36 kg/m². Chart reviewed including medical/surgical/family history. Allergies   Allergen Reactions    Codeine Rash    Codeine Hives    Doxycycline Other (comments)     Dark urine    Doxycycline Itching     Urine turned black    Topamax [Topiramate] Itching     Rash, itching    Topamax [Topiramate] Hives    Verapamil Other (comments)     tachycardia    Verapamil Palpitations    has a current medication list which includes the following prescription(s): sulfamethoxazole/trimethoprim, buspirone, bupropion sr, testosterone cypionate, ibuprofen, needle (disp) 21 g, syringe with needle (disp), esomeprazole, lubiprostone, lisinopril, tamsulosin, metoprolol tartrate, triamcinolone acetonide, tizanidine, amlodipine besylate, hydrocodone-acetaminophen, amlodipine, levothyroxine, albuterol, acetaminophen, furosemide, hydroxyzine hcl, esomeprazole, and cholecalciferol. Social History     Tobacco Use    Smoking status: Former Smoker     Last attempt to quit: 1987     Years since quittin.6    Smokeless tobacco: Never Used   Substance Use Topics    Alcohol use: No     Frequency: Never    Drug use: No    Received     Is someone accompanying this pt? Yes spouse    Is the patient using any DME equipment during OV? no    Pain Assessment:  Pain Assessment  2019   Location of Pain Back;Hip; Foot   Location Modifiers Left   Severity of Pain 5   Quality of Pain Aching   Quality of Pain Comment numbness left hip foot   Duration of Pain Persistent   Frequency of Pain Constant   Aggravating Factors -   Aggravating Factors Comment -   Limiting Behavior -   Relieving Factors - Result of Injury -   Work-Related Injury -   Type of Injury -   Type of Injury Comment -       Depression Screening:  3 most recent PHQ Screens 7/15/2019   PHQ Not Done -   Little interest or pleasure in doing things Not at all   Feeling down, depressed, irritable, or hopeless Not at all   Total Score PHQ 2 0       Learning Assessment:  No flowsheet data found. Abuse Screening:  No flowsheet data found. Fall Risk  Fall Risk Assessment, last 12 mths 7/15/2019   Able to walk? Yes   Fall in past 12 months? Yes   Fall with injury? No   Number of falls in past 12 months 2   Fall Risk Score 2       OPIOID RISK TOOL  No flowsheet data found. Pt currently taking Antiplatelet therapy? no    Coordination of Care:  1. Have you been to the ER, urgent care clinic since your last visit? no  Hospitalized since your last visit? no    2.  Have you seen or consulted any other health care providers outside of the [address] [place] [address] since your last visit? no

## 2019-08-22 ENCOUNTER — OFFICE VISIT (OUTPATIENT)
Dept: FAMILY MEDICINE CLINIC | Age: 64
End: 2019-08-22

## 2019-08-22 VITALS
DIASTOLIC BLOOD PRESSURE: 73 MMHG | SYSTOLIC BLOOD PRESSURE: 127 MMHG | WEIGHT: 290 LBS | HEIGHT: 75 IN | TEMPERATURE: 95.7 F | OXYGEN SATURATION: 95 % | RESPIRATION RATE: 18 BRPM | HEART RATE: 56 BPM | BODY MASS INDEX: 36.06 KG/M2

## 2019-08-22 DIAGNOSIS — F41.1 GAD (GENERALIZED ANXIETY DISORDER): ICD-10-CM

## 2019-08-22 DIAGNOSIS — G43.909 MIGRAINE SYNDROME: Primary | ICD-10-CM

## 2019-08-22 DIAGNOSIS — M79.671 RIGHT FOOT PAIN: ICD-10-CM

## 2019-08-22 NOTE — PROGRESS NOTES
Chief Complaint   Patient presents with    Migraine    Fatigue    Anxiety     some chest pain due to anxiety    Foot Pain     right burnig in foot     . .1. Have you been to the ER, urgent care clinic since your last visit? Hospitalized since your last visit? Yes Reason for visit: Pain Management    2. Have you seen or consulted any other health care providers outside of the [address] [place] [address] since your last visit? Include any pap smears or colon screening.  Yes Where: Dr. Marquis Schmidt

## 2019-08-22 NOTE — PROGRESS NOTES
HISTORY OF PRESENT ILLNESS  Luis Kaiser is a 75 y.o. male. Patient Is here due to multiple medical issues. He recently had his SI joint injection and after a few weeks he fell and the lower back started to strain again. He was seen by his specialist for injections and was advised to come back every 4 months for these injections. He does have a lot of anxiety and has been started on Buspar and he mentions this helps. He has right foot pain and I have advised him to wear compression stockings and keep foot elevated. He also mentions his migraine are getting worse and he used to be on gabapentin which helped . He is currently with pain management and I have discussed that he will need to discuss this with her. He would like to have blood work done again. Anxiety   The history is provided by the patient. This is a chronic problem. The problem occurs constantly. The problem has been gradually improving. Associated symptoms include headaches. Pertinent negatives include no chest pain, no abdominal pain and no shortness of breath. The symptoms are aggravated by stress. The symptoms are relieved by medications. Treatments tried: buspar. The treatment provided mild relief. Foot Pain   The history is provided by the patient. This is a recurrent problem. The problem occurs constantly. The problem has not changed since onset. Associated symptoms include headaches. Pertinent negatives include no chest pain, no abdominal pain and no shortness of breath. Associated symptoms comments: Right foot pain . The symptoms are aggravated by walking, standing and exertion. Nothing relieves the symptoms. He has tried nothing for the symptoms. Migraine    The history is provided by the patient. This is a chronic problem. The problem occurs constantly. The problem has not changed since onset. The headache is aggravated by nothing. The pain is located in the bilateral region. The quality of the pain is described as sharp.  The pain is at a severity of 4/10. Pertinent negatives include no anorexia, no fever, no malaise/fatigue, no orthopnea, no shortness of breath, no weakness, no tingling, no visual change, no nausea and no vomiting. Treatments tried: gabapentin in past. The treatment provided moderate relief. Review of Systems   Constitutional: Negative for chills, fever and malaise/fatigue. HENT: Negative for congestion, hearing loss, nosebleeds, sinus pain and sore throat. Eyes: Negative for blurred vision, double vision and discharge. Respiratory: Negative for cough and shortness of breath. Cardiovascular: Negative for chest pain and orthopnea. Gastrointestinal: Negative for abdominal pain, anorexia, constipation, nausea and vomiting. Musculoskeletal: Positive for back pain and joint pain. Right foot pain    Neurological: Positive for headaches. Negative for tingling and weakness. Endo/Heme/Allergies: Negative for environmental allergies. Psychiatric/Behavioral: Positive for depression. Negative for memory loss, substance abuse and suicidal ideas. The patient is nervous/anxious. Visit Vitals  /73   Pulse (!) 56   Temp 95.7 °F (35.4 °C) (Oral)   Resp 18   Ht 6' 3\" (1.905 m)   Wt 290 lb (131.5 kg)   SpO2 95%   BMI 36.25 kg/m²       Physical Exam   Constitutional: He is oriented to person, place, and time. He appears well-developed and well-nourished. No distress. HENT:   Head: Normocephalic and atraumatic. Right Ear: External ear normal.   Left Ear: External ear normal.   Mouth/Throat: Oropharynx is clear and moist. No oropharyngeal exudate. Eyes: Pupils are equal, round, and reactive to light. EOM are normal. No scleral icterus. Neck: No thyromegaly present. Cardiovascular: Normal rate, regular rhythm and normal heart sounds. Pulmonary/Chest: Effort normal and breath sounds normal. No respiratory distress. He has no wheezes. Abdominal: Soft.  Bowel sounds are normal. He exhibits no distension. There is no tenderness. Lymphadenopathy:     He has no cervical adenopathy. Neurological: He is alert and oriented to person, place, and time. Psychiatric: He has a normal mood and affect. ASSESSMENT and PLAN  Migraine :  1) Can apply a cold compress on area of pain . 2) Use comfortable pillows for the neck and head to support. 3) Rest in a room with little or no sensory stimulation such as light , sound or odors. 4) Avoid stress   5) Adequate sleep hygiene. 6) Drink moderate amount of fluids and water , caffeine may even help. 7) You can even try OTC medications such as tylenol , NSAID'S, Excedrin migraine , etc.  8) Abortive therapy with medication as prescribed , please make sure you take this medication once it starts or when you feel it is about to come on. 9) Preventative treatment will also be discussed. [place] discuss with pain management       Right foot pain :  .  - Keep extremities  elevated   - TEDS Support stockings   - Ok to use diuretic as prescribed   - Avoid foods with a lot of salt and sodium    Anxiety :  - Please do not take more  than the reccommended dose, explained side effects and patient verbalized understanding.

## 2019-08-28 ENCOUNTER — OFFICE VISIT (OUTPATIENT)
Dept: ORTHOPEDIC SURGERY | Age: 64
End: 2019-08-28

## 2019-08-28 VITALS
RESPIRATION RATE: 16 BRPM | SYSTOLIC BLOOD PRESSURE: 135 MMHG | HEART RATE: 69 BPM | HEIGHT: 75 IN | BODY MASS INDEX: 35.93 KG/M2 | DIASTOLIC BLOOD PRESSURE: 84 MMHG | TEMPERATURE: 95.7 F | WEIGHT: 289 LBS | OXYGEN SATURATION: 96 %

## 2019-08-28 DIAGNOSIS — G57.82 NEURITIS OF LEFT SURAL NERVE: Primary | ICD-10-CM

## 2019-08-28 DIAGNOSIS — G57.81 NEURITIS OF RIGHT SURAL NERVE: ICD-10-CM

## 2019-08-28 DIAGNOSIS — S92.512D CLOSED DISPLACED FRACTURE OF PROXIMAL PHALANX OF LESSER TOE OF LEFT FOOT WITH ROUTINE HEALING, SUBSEQUENT ENCOUNTER: ICD-10-CM

## 2019-08-28 DIAGNOSIS — R60.0 LOWER LEG EDEMA: ICD-10-CM

## 2019-08-28 RX ORDER — BUSPIRONE HYDROCHLORIDE 10 MG/1
TABLET ORAL
Qty: 90 TAB | Refills: 0 | Status: SHIPPED | OUTPATIENT
Start: 2019-08-28 | End: 2019-10-19 | Stop reason: SDUPTHER

## 2019-08-28 NOTE — PROGRESS NOTES
1. Have you been to the ER, urgent care clinic since your last visit? Hospitalized since your last visit? NO    2. Have you seen or consulted any other health care providers outside of the [address] [place] [address] since your last visit? Include any pap smears or colon screening.  NO

## 2019-08-28 NOTE — PATIENT INSTRUCTIONS
You have been provided with an order for durable medical equipment that you may  at an outside facility as our office does not carry the equipment you need. You may pick it up at any medical supply company you like. Listed below are a few different locations for your convenience:    [address] [place] [address]  [address], [address]  Phone: [phone]     Leg and Ankle Edema: Care Instructions  Your Care Instructions  Swelling in the legs, ankles, and feet is called edema. It is common after you sit or stand for a while. Long plane flights or car rides often cause swelling in the legs and feet. You may also have swelling if you have to stand for long periods of time at your job. Problems with the veins in the legs (varicose veins) and changes in hormones can also cause swelling. Sometimes the swelling in the ankles and feet is caused by a more serious problem, such as heart failure, infection, blood clots, or liver or kidney disease. Follow-up care is a key part of your treatment and safety. Be sure to make and go to all appointments, and call your doctor if you are having problems. It's also a good idea to know your test results and keep a list of the medicines you take. How can you care for yourself at home? · If your doctor gave you medicine, take it as prescribed. Call your doctor if you think you are having a problem with your medicine. · Whenever you are resting, raise your legs up. Try to keep the swollen area higher than the level of your heart. · Take breaks from standing or sitting in one position. ? Walk around to increase the blood flow in your lower legs. ? Move your feet and ankles often while you stand, or tighten and relax your leg muscles. · Wear support stockings. Put them on in the morning, before swelling gets worse. · Eat a balanced diet. Lose weight if you need to. · Limit the amount of salt (sodium) in your diet.  Salt holds fluid in the body and may increase swelling. When should you call for help? Call 911 anytime you think you may need emergency care. For example, call if:    · You have symptoms of a blood clot in your lung (called a pulmonary embolism). These may include:  ? Sudden chest pain. ? Trouble breathing. ? Coughing up blood.    Call your doctor now or seek immediate medical care if:    · You have signs of a blood clot, such as:  ? Pain in your calf, back of the knee, thigh, or groin. ? Redness and swelling in your leg or groin.     · You have symptoms of infection, such as:  ? Increased pain, swelling, warmth, or redness. ? Red streaks or pus. ? A fever.    Watch closely for changes in your health, and be sure to contact your doctor if:    · Your swelling is getting worse.     · You have new or worsening pain in your legs.     · You do not get better as expected. Where can you learn more? Go to http://jamel-erika.info/. Enter R944 in the search box to learn more about \"Leg and Ankle Edema: Care Instructions. \"  Current as of: September 23, 2018  Content Version: 12.1  © 7544-8844 [employer]. Care instructions adapted under license by [employer] (which disclaims liability or warranty for this information). If you have questions about a medical condition or this instruction, always ask your healthcare professional. [address] any warranty or liability for your use of this information.

## 2019-08-28 NOTE — PROGRESS NOTES
AMBULATORY PROGRESS NOTE      Patient: Val Davies MRN: [id number]     SSN: [id number] Body mass index is 36.12 kg/m². YOB: 1955     AGE: 75 y.o. EX: male    PCP: Emeli Ziegler MD     IMPRESSION/DIAGNOSIS AND TREATMENT PLAN     DIAGNOSES  1. Neuritis of left sural nerve    2. Neuritis of right sural nerve    3. Lower leg edema    4. Closed displaced fracture of proximal phalanx of lesser toe of left foot with routine healing, subsequent encounter        Orders Placed This Encounter    [facility] Shania Kirk understands his diagnoses and the proposed plan. I think the discomfort in the sural nerve distribution may be coming from his back. He does have a spinal cord stimulator that was placed in [place]. He is going to call the representative to see whether they can adjust it in order to give him some relief for which he has some discomfort in the S1 nerve root, sural nerve distribution on the bilateral lower extremities along the lateral portion of his forefoot. He does have some swelling in his legs. His wife and he tell me his heart function and kidney and liver function are normal.  Albumin and blood are normal, and they also tell me his PVL studies have been negative. My plan is listed as below. Plan:    1) DME Order: Low pressure Jobst compression stockings (LMS). 2) Continue activity modification as directed. 3) Anticipate EMG/NCS of the BLE if condition does not improve. RTO - 2 weeks     HPI AND EXAMINATION     Val Davies IS A 75 y.o. male who presents to my outpatient office for follow up of a closed displaced fracture of the proximal phalanx of the lesser toe of the left foot. At the last visit, I instructed the patient to continue activity modification as directed, to continue wearing supportive shoes, and to follow up as needed. Date of injury: 06/18/2019.      Since we saw him last, . Cody Barajas states that his toe is doing much better. However, he reports that now, he is experiencing constant burning pain in his bilateral feet. He notes that this pain started after his last visit with me. He adds that when he walks, he experiences pain, and that he has also been experiencing redness in his feet. Even today, as he walked from his car to our office, he reports that he was experiencing pain when he walks. He does have h/o back issues with his lumbar spine, L1-L5. He is followed by Dr. Aminata Michel for his back issues. He has leg swelling, and he reports that Dr. Aminata Michel and two other physicians believe that the swelling is from his back issues. Mr. Fito Miller states that he has a spinal stimulator that reaches his feet. He denies h/o DM. Visit Vitals  /84 (BP 1 Location: Left arm, BP Patient Position: Sitting)   Pulse 69   Temp 95.7 °F (35.4 °C) (Oral)   Resp 16   Ht 6' 3\" (1.905 m)   Wt 289 lb (131.1 kg)   SpO2 96%   BMI 36.12 kg/m²     Appearance: Alert, well appearing and pleasant patient who is in no distress, oriented to person, place/time, and who follows commands. This patient is accompanied in the examination room by his wife. Dementia: no dementia  Psychiatric: Affect and mood are appropriate. Patient arrives to office via: with assistive device: cane  HEENT: Head normocephalic & atraumatic. Both pupils are round, non icteric sclera   Eye: EOM are intact and sclera are clear    Neck: ROM WNL and JVD neck is not present     Hearings Intact, does not require hearing aid device  Respiratory: Breathing is unlabored without accessory chest muscle use  Cardiovascular/Peripheral Vascular: Normal Pulses to each foot    ANKLE/FOOT bilateral    Gait: normal  Tenderness: Mild tenderness to the lateral column of the foot    Mild tenderness to the anterior tibial cortex to light touch    NT to the left 5th metatarsal  Cutaneous: Reddened discoloration to the toes. Joint Motion: Not tested.   Joint / Tendon Stability: No Ankle or Subtalar instability or joint laxity. No peroneal sublux ability or dislocation  Alignment: Pes cavus alignment  Neuro Motor/Sensory: NL/NL. Negative Tinel's to the sural nerve. Vascular: NL foot/ankle pulses. Trace pre-tibial pitting edema  Lymphatics: No extremity lymphedema, No calf swelling, no tenderness to calf muscles. CHART REVIEW     Past Medical History:   Diagnosis Date    A-fib [facility]) 1977 to present    Calculus of kidney     Cancer (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     Depression     Endocrine disease     Hypothyroidism    Fractures 1978, 2000    ankle left, left wrist    Headache     Hearing loss 2013    Hypertension     Ill-defined condition     Pain stimulator in back    Ill-defined condition     irregular heartbeat    Psychiatric disorder     depression    Thyroid disease      Current Outpatient Medications   Medication Sig    busPIRone (BUSPAR) 10 mg tablet TAKE 1 TABLET BY MOUTH THREE TIMES DAILY    acetaminophen/diphenhydramine (TYLENOL PM PO) Take  by mouth.  buPROPion SR (WELLBUTRIN SR) 150 mg SR tablet Take 1 Tab by mouth daily.  testosterone cypionate (DEPOTESTOTERONE CYPIONATE) 200 mg/mL injection 0.5 mL by IntraMUSCular route every seven (7) days. Max Daily Amount: 100 mg.  furosemide (LASIX) 20 mg tablet     hydrOXYzine HCl (ATARAX) 50 mg tablet Take 50 mg by mouth three (3) times daily as needed for Itching.  ibuprofen (MOTRIN) 200 mg tablet Take  by mouth.  Needle, Disp, 21 G (HYPODERMIC NEEDLES) 21 gauge x 1\" ndle Inject 0.5ml  intramuscular route every 7 days    Syringe with Needle, Disp, (MONOJECT SAFETY SYRINGES) syrg To be used with medication    esomeprazole (NEXIUM) 40 mg capsule Take 1 Cap by mouth daily.  lubiPROStone (AMITIZA) 24 mcg capsule Take  by mouth. PRN    lisinopril (PRINIVIL, ZESTRIL) 40 mg tablet Take 40 mg by mouth daily.     tamsulosin (FLOMAX) 0.4 mg capsule Take 0.4 mg by mouth daily.  metoprolol tartrate (LOPRESSOR) 25 mg tablet Take 12.5 mg by mouth every morning. 25mg 1/2 in am    triamcinolone acetonide (KENALOG) 0.1 % topical cream Apply  to affected area once as needed for Skin Irritation. use thin layer    cholecalciferol (VITAMIN D3) 1,000 unit tablet Take  by mouth daily.  tiZANidine (ZANAFLEX) 4 mg capsule Take 4 mg by mouth two (2) times a day.  AMLODIPINE BESYLATE, BULK, Take 5 mg by mouth daily.  HYDROcodone-acetaminophen (NORCO)  mg tablet Take 1 Tab by mouth every twelve (12) hours as needed for Pain. Max Daily Amount: 2 Tabs.  amLODIPine (NORVASC) 5 mg tablet Take 1 Tab by mouth daily.  levothyroxine (SYNTHROID) 100 mcg tablet Take 1 Tab by mouth Daily (before breakfast).  albuterol (VENTOLIN HFA) 90 mcg/actuation inhaler Take 2 Puffs by inhalation every four (4) hours as needed for Wheezing.  acetaminophen (TYLENOL) 325 mg tablet Take 2 Tabs by mouth every six (6) hours as needed for Pain. No current facility-administered medications for this visit.       Allergies   Allergen Reactions    Codeine Rash    Codeine Hives    Doxycycline Other (comments)     Dark urine    Doxycycline Itching     Urine turned black    Topamax [Topiramate] Itching     Rash, itching    Topamax [Topiramate] Hives    Verapamil Other (comments)     tachycardia    Verapamil Palpitations     Past Surgical History:   Procedure Laterality Date    HX HEENT      Reconstructive surgery on face    HX KNEE REPLACEMENT      bilat knees    HX ORTHOPAEDIC      back surgery X4     Social History     Occupational History    Not on file   Tobacco Use    Smoking status: Former Smoker     Last attempt to quit:      Years since quittin.6    Smokeless tobacco: Never Used   Substance and Sexual Activity    Alcohol use: No     Frequency: Never    Drug use: No    Sexual activity: Never     Partners: Female     Family History Problem Relation Age of Onset    Arthritis-osteo Mother     Bleeding Prob Mother     Cancer Mother     Headache Mother     Hypertension Mother     Migraines Mother     Stroke Mother     Arthritis-osteo Father     Arthritis-osteo Sister     Cancer Sister     Migraines Sister     Arthritis-osteo Brother     Cancer Brother     Headache Brother     Migraines Brother     Stroke Paternal Uncle     Diabetes Other         REVIEW OF SYSTEMS : 8/28/2019  ALL BELOW ARE Negative except : SEE HPI     Constitutional: Negative for fever, chills and weight loss. Neg Weight Loss  Cardiovascular: Negative for chest pain, claudication and leg swelling. SOB, ROSALES   Gastrointestinal/Urological: Negative for  pain, N/V/D/C, Blood in stool or urine,dysuria                         Hematuria, Incontinence, pelvic pain  Musculoskeletal: see HPI. Neurological: Negative for dizziness and weakness, headaches,Visual Changes             Confusion,  Or Seizures,   Psychiatric/Behavioral: Negative for depression, memory loss and substance abuse. Extremities:  Negative for hair changes, rash or skin lesion changes. Hematologic: Negative for Bleeding problems, bruising, pallor or swollen lymph nodes. Peripheral Vascular: No calf pain, vascular vein tenderness to calf pain              No calf throbbing, posterior knee throbbing pain     DIAGNOSTIC IMAGING     No notes on file    Please see above section of this report. I have personally reviewed the results of the above study. The interpretation of this study is my professional opinion. Written by Kay Spaulding, as dictated by Dr. Casey Wilcox. I, Dr. Casey Wilcox, confirm that all documentation is accurate.

## 2019-09-10 ENCOUNTER — OFFICE VISIT (OUTPATIENT)
Dept: ORTHOPEDIC SURGERY | Age: 64
End: 2019-09-10

## 2019-09-10 VITALS
TEMPERATURE: 97.5 F | BODY MASS INDEX: 35.98 KG/M2 | DIASTOLIC BLOOD PRESSURE: 84 MMHG | HEART RATE: 59 BPM | SYSTOLIC BLOOD PRESSURE: 146 MMHG | WEIGHT: 289.4 LBS | RESPIRATION RATE: 14 BRPM | HEIGHT: 75 IN

## 2019-09-10 DIAGNOSIS — T85.192A FAILURE OF SPINAL CORD STIMULATOR, INITIAL ENCOUNTER (HCC): Primary | ICD-10-CM

## 2019-09-10 DIAGNOSIS — M96.1 LUMBAR POST-LAMINECTOMY SYNDROME: ICD-10-CM

## 2019-09-10 DIAGNOSIS — G89.4 CHRONIC PAIN SYNDROME: ICD-10-CM

## 2019-09-10 NOTE — PROGRESS NOTES
[facility]   Pre-Surgical Worksheet    Patient: Juan Antonio Valdes. MRN: [id number]     Age:  75 y.o.,      Sex: male    YOB: 1955           LUAN: September 10, 2019  PCP: Libby Sahu MD    Allergies   Allergen Reactions    Codeine Rash    Codeine Hives    Doxycycline Other (comments)     Dark urine    Doxycycline Itching     Urine turned black    Topamax [Topiramate] Itching     Rash, itching    Topamax [Topiramate] Hives    Verapamil Other (comments)     tachycardia    Verapamil Palpitations         ICD-10-CM ICD-9-CM    1. Failure of spinal cord stimul[facility], [facility] T85.192A 996.2    2. Lumbar post-laminectomy syndrome M96.1 722.83    3. Chronic pain syndrome G89.4 338.4        Surgery: Revise Generator. Pain Assessment   Pain Assessment  9/10/2019   Location of Pain Back; Foot   Location Modifiers Left   Severity of Pain 5   Quality of Pain Aching   Quality of Pain Comment numbness tingling   Duration of Pain -   Frequency of Pain Constant   Aggravating Factors Walking;Standing;Bending   Aggravating Factors Comment -   Limiting Behavior -   Relieving Factors Nothing   Relieving Factors Comment SCS helped when it worked   Result of Injury -   Work-Related Injury -   Type of Injury -   Type of Injury Comment -       Visit Vitals  /84 (BP 1 Location: Left arm, BP Patient Position: Sitting)   Pulse (!) 59   Temp 97.5 °F (36.4 °C) (Oral)   Resp 14   Ht 6' 3\" (1.905 m)   Wt 289 lb 6.4 oz (131.3 kg)   BMI 36.17 kg/m²       ADL Limits: Bathing and Dressing; Patient needs assistance from wife to put shoes and socks on due to now having difficulty doing this. Spine Surgery?: Yes When 2010. Where [place], [place] and [facility], [place]. Spinal Injections?: Yes  When 2010. Where [place] and [place]. Physical Therapy?: No:   When . Where.     NSAID's?: Yes; Ibuprofen    Pain Medications?: Yes  Type: Norco  mg; Narcotic. In Pain Management: YES, Where: Dr. Milo Gill, [place]    Current Outpatient Medications   Medication Sig    busPIRone (BUSPAR) 10 mg tablet TAKE 1 TABLET BY MOUTH THREE TIMES DAILY    buPROPion SR (WELLBUTRIN SR) 150 mg SR tablet Take 1 Tab by mouth daily.  testosterone cypionate (DEPOTESTOTERONE CYPIONATE) 200 mg/mL injection 0.5 mL by IntraMUSCular route every seven (7) days. Max Daily Amount: 100 mg.  furosemide (LASIX) 20 mg tablet     hydrOXYzine HCl (ATARAX) 50 mg tablet Take 50 mg by mouth three (3) times daily as needed for Itching.  ibuprofen (MOTRIN) 200 mg tablet Take  by mouth.  Needle, Disp, 21 G (HYPODERMIC NEEDLES) 21 gauge x 1\" ndle Inject 0.5ml  intramuscular route every 7 days    Syringe with Needle, Disp, (MONOJECT SAFETY SYRINGES) syrg To be used with medication    esomeprazole (NEXIUM) 40 mg capsule Take 1 Cap by mouth daily.  lubiPROStone (AMITIZA) 24 mcg capsule Take  by mouth. PRN    lisinopril (PRINIVIL, ZESTRIL) 40 mg tablet Take 40 mg by mouth daily.  tamsulosin (FLOMAX) 0.4 mg capsule Take 0.4 mg by mouth daily.  metoprolol tartrate (LOPRESSOR) 25 mg tablet Take 12.5 mg by mouth every morning. 25mg 1/2 in am    triamcinolone acetonide (KENALOG) 0.1 % topical cream Apply  to affected area once as needed for Skin Irritation. use thin layer    cholecalciferol (VITAMIN D3) 1,000 unit tablet Take  by mouth daily.  tiZANidine (ZANAFLEX) 4 mg capsule Take 4 mg by mouth two (2) times a day.  AMLODIPINE BESYLATE, BULK, Take 5 mg by mouth daily.  amLODIPine (NORVASC) 5 mg tablet Take 1 Tab by mouth daily.  levothyroxine (SYNTHROID) 100 mcg tablet Take 1 Tab by mouth Daily (before breakfast).  albuterol (VENTOLIN HFA) 90 mcg/actuation inhaler Take 2 Puffs by inhalation every four (4) hours as needed for Wheezing.  acetaminophen (TYLENOL) 325 mg tablet Take 2 Tabs by mouth every six (6) hours as needed for Pain.  acetaminophen/diphenhydramine (TYLENOL PM PO) Take  by mouth.  HYDROcodone-acetaminophen (NORCO)  mg tablet Take 1 Tab by mouth every twelve (12) hours as needed for Pain. Max Daily Amount: 2 Tabs. No current facility-administered medications for this visit.         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    Cancer ([facility]     Depression     Endocrine disease     Hypothyroidism    Fractures ,     ankle left, left wrist    Headache     Hearing loss     Hypertension     Ill-defined condition     Pain stimulator in back    Ill-defined condition     irregular heartbeat    Psychiatric disorder     depression    Thyroid disease        Past Surgical History:   Procedure Laterality Date    HX HEENT      Reconstructive surgery on face    HX KNEE REPLACEMENT      bilat knees    HX ORTHOPAEDIC      back surgery X4       Social History     Socioeconomic History    Marital status:      Spouse name: Not on file    Number of children: Not on file    Years of education: Not on file    Highest education level: Not on file   Tobacco Use    Smoking status: Former Smoker     Last attempt to quit:      Years since quittin.7    Smokeless tobacco: Never Used   Substance and Sexual Activity    Alcohol use: No     Frequency: Never    Drug use: No    Sexual activity: Never     Partners: Female   Social History Narrative    ** Merged History Encounter **

## 2019-09-10 NOTE — PROGRESS NOTES
[facility]  [address], [address]  [address]  Phone: [phone]  Fax: [phone]  INITIAL CONSULTATION  Patient: Adonay Jacobs. MRN: [id number]       SSN: [id number]  YOB: 1955        AGE: 75 y.o. SEX: male  Body mass index is 36.17 kg/m². PCP: Aliyah Murphy MD  09/10/19    Chief Complaint   Patient presents with    Back Pain     SC         HISTORY OF PRESENT ILLNESS, RADIOGRAPHS, and PLAN:         HISTORY OF PRESENT ILLNESS:  Mr. Danie Manriquez is seen today at the request of Dr. Cassie Gallegos. Mr. Danie Manriquez is a pleasant, 53-year-old male without a significant past medical history other than peripheral neuropathy. About a decade ago, he had a spinal cord stimulator placed in [place], [place] for chronic pain. It dramatically improved his pain and minimized his use of pain medication. He had multiple previous lumbar surgeries prior to that. The stimulator, however, is no longer holding a charge and has become increasingly dysfunctional.  It has been evaluated by the representatives from [employer], and it requires revision. ASSESSMENT/PLAN: We discussed the nature of revision of spinal cord stimulator generators and the risks, benefits, complications, and alternatives to such a revision. The patient wishes to proceed. We will proceed with a spinal cord stimulator revision once the appropriate approvals and clearances take place. cc: Deidra Riggs M.D.          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 Migraines Mother     Stroke Mother     Arthritis-osteo Father     Arthritis-osteo Sister     Cancer Sister     Migraines Sister     Arthritis-osteo Brother     Cancer Brother     Headache Brother     Migraines Brother     Stroke Paternal Uncle     Diabetes Other        Current Outpatient Medications   Medication Sig Dispense Refill    busPIRone (BUSPAR) 10 mg tablet TAKE 1 TABLET BY MOUTH THREE TIMES DAILY 90 Tab 0    buPROPion SR (WELLBUTRIN SR) 150 mg SR tablet Take 1 Tab by mouth daily. 90 Tab 0    testosterone cypionate (DEPOTESTOTERONE CYPIONATE) 200 mg/mL injection 0.5 mL by IntraMUSCular route every seven (7) days. Max Daily Amount: 100 mg. 10 mL 3    furosemide (LASIX) 20 mg tablet       hydrOXYzine HCl (ATARAX) 50 mg tablet Take 50 mg by mouth three (3) times daily as needed for Itching.  ibuprofen (MOTRIN) 200 mg tablet Take  by mouth.  Needle, Disp, 21 G (HYPODERMIC NEEDLES) 21 gauge x 1\" ndle Inject 0.5ml  intramuscular route every 7 days 10 Pen Needle 5    Syringe with Needle, Disp, (MONOJECT SAFETY SYRINGES) syrg To be used with medication 30 Syringe 3    esomeprazole (NEXIUM) 40 mg capsule Take 1 Cap by mouth daily. 180 Cap 3    lubiPROStone (AMITIZA) 24 mcg capsule Take  by mouth. PRN      lisinopril (PRINIVIL, ZESTRIL) 40 mg tablet Take 40 mg by mouth daily.  tamsulosin (FLOMAX) 0.4 mg capsule Take 0.4 mg by mouth daily.  metoprolol tartrate (LOPRESSOR) 25 mg tablet Take 12.5 mg by mouth every morning. 25mg 1/2 in am      triamcinolone acetonide (KENALOG) 0.1 % topical cream Apply  to affected area once as needed for Skin Irritation. use thin layer      cholecalciferol (VITAMIN D3) 1,000 unit tablet Take  by mouth daily.  tiZANidine (ZANAFLEX) 4 mg capsule Take 4 mg by mouth two (2) times a day.  AMLODIPINE BESYLATE, BULK, Take 5 mg by mouth daily.  amLODIPine (NORVASC) 5 mg tablet Take 1 Tab by mouth daily. 90 Tab 3    levothyroxine (SYNTHROID) 100 mcg tablet Take 1 Tab by mouth Daily (before breakfast). 90 Tab 3    albuterol (VENTOLIN HFA) 90 mcg/actuation inhaler Take 2 Puffs by inhalation every four (4) hours as needed for Wheezing. 1 Inhaler 0    acetaminophen (TYLENOL) 325 mg tablet Take 2 Tabs by mouth every six (6) hours as needed for Pain. 20 Tab 0    acetaminophen/diphenhydramine (TYLENOL PM PO) Take  by mouth.  HYDROcodone-acetaminophen (NORCO)  mg tablet Take 1 Tab by mouth every twelve (12) hours as needed for Pain. Max Daily Amount: 2 Tabs.  60 Tab 0       Allergies   Allergen Reactions    Codeine Rash    Codeine Hives    Doxycycline Other (comments)     Dark urine    Doxycycline Itching     Urine turned black    Topamax [Topiramate] Itching     Rash, itching    Topamax [Topiramate] Hives    Verapamil Other (comments)     tachycardia    Verapamil Palpitations       Past Surgical History:   Procedure Laterality Date    HX HEENT      Reconstructive surgery on face    HX KNEE REPLACEMENT      bilat knees    HX ORTHOPAEDIC      back surgery X4       Past Medical History:   Diagnosis Date    A-fib ([facility] 1977 to present    Calculus of kidney     Cancer ([facility]     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    Cancer ([facility]     Depression     Endocrine disease     Hypothyroidism    Fractures 1978, 2000    ankle left, left wrist    Headache     Hearing loss 2013    Hypertension     Ill-defined condition     Pain stimulator in back    Ill-defined condition     irregular heartbeat    Psychiatric disorder     depression    Thyroid disease        Social History     Socioeconomic History    Marital status:      Spouse name: Not on file    Number of children: Not on file    Years of education: Not on file    Highest education level: Not on file   Occupational History    Not on file   Social Needs    Financial resource strain: Not on file    Food insecurity:     Worry: Not on file     Inability: Not on file    Transportation needs:     Medical: Not on file     Non-medical: Not on file   Tobacco Use    Smoking status: Former Smoker     Last attempt to quit:      Years since quittin.7    Smokeless tobacco: Never Used   Substance and Sexual Activity    Alcohol use: No     Frequency: Never    Drug use: No    Sexual activity: Never     Partners: Female   Lifestyle    Physical activity:     Days per week: Not on file     Minutes per session: Not on file    Stress: Not on file   Relationships    Social connections:     Talks on phone: Not on file     Gets together: Not on file     Attends Shinto service: Not on file     Active member of club or organization: Not on file     Attends meetings of clubs or organizations: Not on file     Relationship status: Not on file    Intimate partner violence:     Fear of current or ex partner: Not on file     Emotionally abused: Not on file     Physically abused: Not on file     Forced sexual activity: Not on file   Other Topics Concern    Not on file   Social History Narrative    ** Merged History Encounter **                REVIEW OF SYSTEMS:   CONSTITUTIONAL SYMPTOMS:  Negative. EYES:  Negative. EARS, NOSE, THROAT AND MOUTH:  Negative. CARDIOVASCULAR:  Negative. RESPIRATORY:  Negative. GENITOURINARY: Per HPI. GASTROINTESTINAL:  Per HPI. INTEGUMENTARY (SKIN AND/OR BREAST):  Negative. MUSCULOSKELETAL: Per HPI.   ENDOCRINE/RHEUMATOLOGIC:  Negative. NEUROLOGICAL:  Per HPI. HEMATOLOGIC/LYMPHATIC:  Negative. ALLERGIC/IMMUNOLOGIC:  Negative. PSYCHIATRIC:  Negative.     PHYSICAL EXAMINATION:   Visit Vitals  /84 (BP 1 Location: Left arm, BP Patient Position: Sitting)   Pulse (!) 59   Temp 97.5 °F (36.4 °C) (Oral)   Resp 14   Ht 6' 3\" (1.905 m)   Wt 289 lb 6.4 oz (131.3 kg)   BMI 36.17 kg/m²    PAIN SCALE: /10    CONSTITUTIONAL: The patient is in no apparent distress and is alert and oriented x 3. HEENT: Normocephalic. Hearing grossly intact. NECK: Supple and symmetric. no tenderness, or masses were felt. RESPIRATORY: No labored breathing. CARDIOVASCULAR: The carotid pulses were normal. Peripheral pulses were 2+. CHEST: Normal AP diameter and normal contour without any kyphoscoliosis. LYMPHATIC: No lymphadenopathy was appreciated in the neck, axillae or groin. SKIN:  Negative for scars, rashes, lesions, or ulcers on the right upper, right lower, left upper, left lower and trunk. NEUROLOGICAL: Alert and oriented x 3. Ambulation without assistive device. FWB. EXTREMITIES:  See musculoskeletal.  MUSCULOSKELETAL:   Head and Neck:  Negative for misalignment, asymmetry, crepitation, defects, tenderness masses or effusions.  Left Upper Extremity: Inspection, percussion and palpation performed. Posts sign is negative.  Right Upper Extremity: Inspection, percussion and palpation performed. Posts sign is negative.  Spine, Ribs and Pelvis: Back and hip pain. Inspection, percussion and palpation performed. Negative for misalignment, asymmetry, crepitation, defects, tenderness masses or effusions.  Left Lower Extremity: Inspection, percussion and palpation performed. Negative straight leg raise.  Right Lower Extremity: Inspection, percussion and palpation performed. Negative straight leg raise. SPINE EXAM:     Lumbar spine: No rash, ecchymosis, or gross obliquity. No focal atrophy is noted. ASSESSMENT    ICD-10-CM ICD-9-CM    1. [facility] T85.192A 996.2    2. Lumbar post-laminectomy syndrome M96.1 722.83    3.  Chronic pain syndrome G89.4 338.4        Written by Elizabeth Perez, as dictated by Adolph Tam MD.    I, Dr. Aditi Osorio Tamara Serna MD, confirm that all documentation is accurate.

## 2019-09-10 NOTE — PROGRESS NOTES
Jessa Lou. presents today for   Chief Complaint   Patient presents with    Back Pain     SC       Is someone accompanying this pt? YES    Is the patient using any DME equipment during OV? NO    Depression Screening:  3 most recent PHQ Screens 9/10/2019   PHQ Not Done -   Little interest or pleasure in doing things Not at all   Feeling down, depressed, irritable, or hopeless Not at all   Total Score PHQ 2 0   Trouble falling or staying asleep, or sleeping too much -   Feeling tired or having little energy -   Poor appetite, weight loss, or overeating -   Feeling bad about yourself - or that you are a failure or have let yourself or your family down -   Trouble concentrating on things such as school, work, reading, or watching TV -   Moving or speaking so slowly that other people could have noticed; or the opposite being so fidgety that others notice -   Thoughts of being better off dead, or hurting yourself in some way -   PHQ 9 Score -   How difficult have these problems made it for you to do your work, take care of your home and get along with others -         Fall Risk  Fall Risk Assessment, last 12 mths 9/10/2019   Able to walk? Yes   Fall in past 12 months? No   Fall with injury? -   Number of falls in past 12 months -   Fall Risk Score -         Coordination of Care:  1. Have you been to the ER, urgent care clinic since your last visit? NO  Hospitalized since your last visit? NO    2. Have you seen or consulted any other health care providers outside of the [address] [place] [address] since your last visit? NO Include any pap smears or colon screening.  NO

## 2019-09-13 ENCOUNTER — OFFICE VISIT (OUTPATIENT)
Dept: CARDIOLOGY CLINIC | Age: 64
End: 2019-09-13

## 2019-09-13 VITALS
HEART RATE: 56 BPM | WEIGHT: 285 LBS | HEIGHT: 75 IN | DIASTOLIC BLOOD PRESSURE: 72 MMHG | SYSTOLIC BLOOD PRESSURE: 121 MMHG | BODY MASS INDEX: 35.43 KG/M2

## 2019-09-13 DIAGNOSIS — Z01.818 PRE-OP EXAM: ICD-10-CM

## 2019-09-13 DIAGNOSIS — I10 ESSENTIAL HYPERTENSION: ICD-10-CM

## 2019-09-13 DIAGNOSIS — I48.0 PAROXYSMAL ATRIAL FIBRILLATION (HCC): Primary | ICD-10-CM

## 2019-09-13 DIAGNOSIS — E66.01 SEVERE OBESITY (BMI 35.0-39.9) WITH COMORBIDITY (HCC): ICD-10-CM

## 2019-09-13 NOTE — PROGRESS NOTES
HISTORY OF PRESENT ILLNESS  Eloisa Garcia is a 75 y.o. male. 9/19 new patient just moved from [place]. Seen for preop clearance. Has chronic back problems and peripheral neuropathy. History of paroxysmal A. Fib. Last episode 2013 approximately per patient. Has good functional capacity as he can go up 2-3 flights of steps and can walk half to 1 mile on a level ground without stopping. Chest Pain (Angina)    The history is provided by the patient. This is a new problem. The current episode started more than 1 week ago (yrs). Duration of episode(s) is 2 hours. The problem occurs every several days (3-4/wk). The pain is associated with normal activity and rest. The pain is present in the lateral region and left side. The quality of the pain is described as dull. The pain does not radiate. Associated symptoms include lower extremity edema and palpitations (chr AFib). Pertinent negatives include no claudication, no cough, no diaphoresis, no dizziness, no fever, no headaches, no malaise/fatigue, no nausea, no orthopnea, no PND, no shortness of breath and no vomiting. Leg Swelling   The history is provided by the patient. This is a new problem. The current episode started more than 1 week ago (3/19). The problem occurs constantly. Associated symptoms include chest pain. Pertinent negatives include no headaches and no shortness of breath. Nothing aggravates the symptoms. Palpitations    The history is provided by the patient. This is a new problem. The current episode started more than 1 week ago (5/19). The problem occurs rarely (2-3/month). On average, each episode lasts 5 minutes. The problem is associated with nothing. Associated symptoms include chest pain and lower extremity edema. Pertinent negatives include no diaphoresis, no fever, no malaise/fatigue, no claudication, no orthopnea, no PND, no nausea, no vomiting, no headaches, no dizziness, no cough and no shortness of breath.        Review of Systems   Constitutional: Negative for chills, diaphoresis, fever, malaise/fatigue and weight loss. HENT: Negative for nosebleeds. Eyes: Negative for discharge. Respiratory: Negative for cough, shortness of breath and wheezing. Cardiovascular: Positive for chest pain, palpitations (chr AFib) and leg swelling. Negative for orthopnea, claudication and PND. Gastrointestinal: Negative for diarrhea, nausea and vomiting. Genitourinary: Negative for dysuria and hematuria. Musculoskeletal: Negative for joint pain. Skin: Negative for rash. Neurological: Negative for dizziness, seizures, loss of consciousness and headaches. Endo/Heme/Allergies: Negative for polydipsia. Does not bruise/bleed easily. Psychiatric/Behavioral: Negative for depression and substance abuse. The patient does not have insomnia.       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    Cancer ([facility] [address]     Depression     Endocrine disease     Hypothyroidism    Fractures 1978, 2000    ankle left, left wrist    Headache     Hearing loss 2013    Hypertension     Ill-defined condition     Pain stimulator in back    Ill-defined condition     irregular heartbeat    Psychiatric disorder     depression    Thyroid disease        Family History   Problem Relation Age of Onset    Arthritis-osteo Mother     Bleeding Prob Mother     Cancer Mother     Headache Mother     Hypertension Mother     Migraines Mother     Stroke Mother 68    Arthritis-osteo Father     Stroke Father 80    Arthritis-osteo Sister    [address] [facility] Sister     Migraines Sister     Arthritis-osteo Brother     Cancer Brother     Headache Brother     Migraines Brother     Stroke Paternal Uncle     Heart Surgery Paternal Uncle 72    Diabetes Other        Social History     Tobacco Use    Smoking status: Former Smoker     Last attempt to quit:      Years since quittin.7    Smokeless tobacco: Never Used   Substance Use Topics    Alcohol use: No     Frequency: Never    Drug use: No        Current Outpatient Medications   Medication Sig    busPIRone (BUSPAR) 10 mg tablet TAKE 1 TABLET BY MOUTH THREE TIMES DAILY    acetaminophen/diphenhydramine (TYLENOL PM PO) Take  by mouth.  buPROPion SR (WELLBUTRIN SR) 150 mg SR tablet Take 1 Tab by mouth daily.  testosterone cypionate (DEPOTESTOTERONE CYPIONATE) 200 mg/mL injection 0.5 mL by IntraMUSCular route every seven (7) days. Max Daily Amount: 100 mg.  furosemide (LASIX) 20 mg tablet Take 10 mg by mouth daily.  hydrOXYzine HCl (ATARAX) 50 mg tablet Take 50 mg by mouth three (3) times daily as needed for Itching.  Needle, Disp, 21 G (HYPODERMIC NEEDLES) 21 gauge x 1\" ndle Inject 0.5ml  intramuscular route every 7 days    Syringe with Needle, Disp, (MONOJECT SAFETY SYRINGES) syrg To be used with medication    esomeprazole (NEXIUM) 40 mg capsule Take 1 Cap by mouth daily.  lubiPROStone (AMITIZA) 24 mcg capsule Take  by mouth. PRN    lisinopril (PRINIVIL, ZESTRIL) 40 mg tablet Take 40 mg by mouth daily.  tamsulosin (FLOMAX) 0.4 mg capsule Take 0.4 mg by mouth daily.  metoprolol tartrate (LOPRESSOR) 25 mg tablet Take 12.5 mg by mouth every morning. 0.5 tab in a.m 0.5 tab in the evening    cholecalciferol (VITAMIN D3) 1,000 unit tablet Take  by mouth daily.  tiZANidine (ZANAFLEX) 4 mg capsule Take 4 mg by mouth every six (6) hours.  HYDROcodone-acetaminophen (NORCO)  mg tablet Take 1 Tab by mouth every twelve (12) hours as needed for Pain.  Max Daily Amount: 2 Tabs.  amLODIPine (NORVASC) 5 mg tablet Take 1 Tab by mouth daily.  levothyroxine (SYNTHROID) 100 mcg tablet Take 1 Tab by mouth Daily (before breakfast).  acetaminophen (TYLENOL) 325 mg tablet Take 2 Tabs by mouth every six (6) hours as needed for Pain. No current facility-administered medications for this visit. Past Surgical History:   Procedure Laterality Date    HX HEENT      Reconstructive surgery on face    HX KNEE REPLACEMENT      bilat knees    HX ORTHOPAEDIC      back surgery X4       Visit Vitals  /72   Pulse (!) 56   Ht 6' 3\" (1.905 m)   Wt 129.3 kg (285 lb)   BMI 35.62 kg/m²       Diagnostic Studies:  I have reviewed the relevant tests done on the patient and show as follows  EKG tracings reviewed by me today. EKG Results     Procedure 720 Value Units Date/Time    AMB POC EKG ROUTINE W/ 12 LEADS, INTER & REP [[id number]] Resulted:  09/13/19 1054    Order Status:  Completed Updated:  09/13/19 1057        XR Results (most recent):  Results from [place] encounter on 06/27/19   [facility] XR TECHNOLOGIST SERVICE    Narrative Fluoroscopy was provided for a bundled exam for documentation purposes. Impression IMPRESSION:    Please see above. FLUORO TIME: 00.08    AJB                        No flowsheet data found. Mr. Lisset Andres has a reminder for a \"due or due soon\" health maintenance. I have asked that he contact his primary care provider for follow-up on this health maintenance. Physical Exam   Constitutional: He is oriented to person, place, and time. He appears well-developed and well-nourished. No distress. HENT:   Head: Normocephalic and atraumatic. Mouth/Throat: Normal dentition. Eyes: Right eye exhibits no discharge. Left eye exhibits no discharge. No scleral icterus. Neck: Neck supple. No JVD present. Carotid bruit is not present. No thyromegaly present.    Cardiovascular: Normal rate, regular rhythm, S1 normal, S2 normal, normal heart sounds and intact distal pulses. Exam reveals no gallop and no friction rub. No murmur heard. Pulmonary/Chest: Effort normal and breath sounds normal. He has no wheezes. He has no rales. Abdominal: Soft. He exhibits no mass. There is no tenderness. Musculoskeletal: He exhibits edema (trace). Lymphadenopathy:        Right cervical: No superficial cervical adenopathy present. Left cervical: No superficial cervical adenopathy present. Neurological: He is alert and oriented to person, place, and time. Skin: Skin is warm and dry. No rash noted. Psychiatric: He has a normal mood and affect. His behavior is normal.       ASSESSMENT and PLAN    History of paroxysmal A. Fib. Last episode 2013 approximately per patient. Results for Roshan Emiliana Sheldon Curling (MRN [id number]) as of 9/13/2019 11:15   Ref. Range 2/1/2019 08:49   Triglyceride Latest Ref Range: <150 MG/DL 63   Cholesterol, total Latest Ref Range: <200 MG/   HDL Cholesterol Latest Ref Range: 40 - 60 MG/DL 46   CHOL/HDL Ratio Latest Ref Range: 0 - 5.0   3.0   LDL, calculated Latest Ref Range: 0 - 100 MG/DL 77.4   VLDL, calculated Latest Units: MG/DL 12.6     Cleared from cardiac view with the understanding that patient will have mild risk for this surgery. No further cardiac work-up recommended prior to surgery at this time considering his functional capacity being good. Will get a mobile telemetry for 30 days and due to intermittent palpitations which happen 2-3 times a month. Diagnoses and all orders for this visit:    1. Paroxysmal atrial fibrillation (HCC)  -     OK EXTERNAL MOBILE CV TELEMETRY W/I&REPORT UP TO 30 DAYS    2. Pre-op exam  -     AMB POC EKG ROUTINE W/ 12 LEADS, INTER & REP    3. Essential hypertension    4. Severe obesity (BMI 35.0-39. 9) with comorbidity [facility])        Pertinent laboratory and test data reviewed and discussed with patient.   See patient instructions also for other medical advice given    Medications Discontinued During This Encounter   Medication Reason    AMLODIPINE BESYLATE, BULK, Duplicate Order    albuterol (VENTOLIN HFA) 90 mcg/actuation inhaler     ibuprofen (MOTRIN) 200 mg tablet     triamcinolone acetonide (KENALOG) 0.1 % topical cream        Follow-up and Dispositions    · Return in about 6 weeks (around 10/25/2019), or if symptoms worsen or fail to improve, for with ekg.

## 2019-09-27 ENCOUNTER — HOSPITAL ENCOUNTER (OUTPATIENT)
Dept: LAB | Age: 64
Discharge: HOME | End: 2019-09-27
Payer: MEDICARE

## 2019-09-27 DIAGNOSIS — T85.192A FAILURE OF SPINAL CORD STIMULATOR, INITIAL ENCOUNTER (HCC): ICD-10-CM

## 2019-09-27 LAB
ALBUMIN SERPL-MCNC: 3.7 G/DL (ref 3.4–5)
ALBUMIN/GLOB SERPL: 1.2 {RATIO} (ref 0.8–1.7)
ALP SERPL-CCNC: 144 U/L (ref 45–117)
ALT SERPL-CCNC: 20 U/L (ref 16–61)
ANION GAP SERPL CALC-SCNC: 5 MMOL/L (ref 3–18)
AST SERPL-CCNC: 13 U/L (ref 10–38)
BILIRUB SERPL-MCNC: 0.7 MG/DL (ref 0.2–1)
BUN SERPL-MCNC: 15 MG/DL (ref 7–18)
BUN/CREAT SERPL: 16 (ref 12–20)
CALCIUM SERPL-MCNC: 9 MG/DL (ref 8.5–10.1)
CHLORIDE SERPL-SCNC: 110 MMOL/L (ref 100–111)
CO2 SERPL-SCNC: 27 MMOL/L (ref 21–32)
CREAT SERPL-MCNC: 0.95 MG/DL (ref 0.6–1.3)
ERYTHROCYTE [DISTWIDTH] IN BLOOD BY AUTOMATED COUNT: 13.2 % (ref 11.6–14.5)
GLOBULIN SER CALC-MCNC: 3.2 G/DL (ref 2–4)
GLUCOSE SERPL-MCNC: 86 MG/DL (ref 74–99)
HCT VFR BLD AUTO: 44.5 % (ref 36–48)
HGB BLD-MCNC: 15.5 G/DL (ref 13–16)
MCH RBC QN AUTO: 32 PG (ref 24–34)
MCHC RBC AUTO-ENTMCNC: 34.8 G/DL (ref 31–37)
MCV RBC AUTO: 91.8 FL (ref 74–97)
PLATELET # BLD AUTO: 256 K/UL (ref 135–420)
PMV BLD AUTO: 10.6 FL (ref 9.2–11.8)
POTASSIUM SERPL-SCNC: 4.5 MMOL/L (ref 3.5–5.5)
PROT SERPL-MCNC: 6.9 G/DL (ref 6.4–8.2)
RBC # BLD AUTO: 4.85 M/UL (ref 4.7–5.5)
SODIUM SERPL-SCNC: 142 MMOL/L (ref 136–145)
WBC # BLD AUTO: 9.8 K/UL (ref 4.6–13.2)

## 2019-09-27 PROCEDURE — 36415 COLL VENOUS BLD VENIPUNCTURE: CPT

## 2019-09-27 PROCEDURE — 85027 COMPLETE CBC AUTOMATED: CPT

## 2019-09-27 PROCEDURE — 80053 COMPREHEN METABOLIC PANEL: CPT

## 2019-10-01 RX ORDER — BUPROPION HYDROCHLORIDE 150 MG/1
TABLET, EXTENDED RELEASE ORAL
Qty: 90 TAB | Refills: 0 | Status: SHIPPED | OUTPATIENT
Start: 2019-10-01 | End: 2019-10-11 | Stop reason: SDUPTHER

## 2019-10-03 NOTE — H&P
Pre-Admission History and Physical    Patient: Unknown Danisha. MRN: [id number]   SSN: [id number]   YOB: 1955   Age: 75 y.o. Sex: male     Patient scheduled for: revision of generator. Date of surgery: 10/10/19. Location of surgery:  [facility]. Surgeon: Felicita Avila MD    HPI:  Unknown Danisha. is a 75 y.o. male  without a significant past medical history other than peripheral neuropathy. About a decade ago, he had a spinal cord stimulator placed in [place], [place] for chronic pain. It dramatically improved his pain and minimized his use of pain medication. He had multiple previous lumbar surgeries prior to that. The stimulator, however, is no longer holding a charge and has become increasingly dysfunctional.  It has been evaluated by the representatives from [employer], and it requires revision. This patient has failed the presurgical conservative treatments  including  medications. Pain has impacted the patient's functional ability to perform daily activity. He is being admitted for surgical intervention.          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    Cancer ([facility] [address]     Depression     Endocrine disease     Hypothyroidism    Fractures ,     ankle left, left wrist    Headache     Hearing loss     Hypertension     Ill-defined condition     Pain stimulator in back    Ill-defined condition     irregular heartbeat    Psychiatric disorder     depression    Thyroid disease      Social History     Socioeconomic History    Marital status:      Spouse name: Not on file    Number of children: Not on file    Years of education: Not on file    Highest education level: Not on file   Tobacco Use    Smoking status: Former Smoker     Last attempt to quit:      Years since quittin.7    Smokeless tobacco: Never Used   Substance and Sexual Activity    Alcohol use: No     Frequency: Never    Drug use: No    Sexual activity: Never     Partners: Female   Social History Narrative    ** Merged History Encounter **          Past Surgical History:   Procedure Laterality Date    HX HEENT      Reconstructive surgery on face    HX KNEE REPLACEMENT      bilat knees    HX ORTHOPAEDIC      back surgery X4     Family History   Problem Relation Age of Onset    Arthritis-osteo Mother     Bleeding Prob Mother     Cancer Mother     Headache Mother     Hypertension Mother     Migraines Mother     Stroke Mother 68    Arthritis-osteo Father     Stroke Father 80    Arthritis-osteo Sister     Cancer Sister     Migraines Sister     Arthritis-osteo Brother     Cancer Brother     Headache Brother     Migraines Brother     Stroke Paternal Uncle     Heart Surgery Paternal Uncle 72    Diabetes Other      Allergies   Allergen Reactions    Codeine Rash    Codeine Hives    Doxycycline Other (comments)     Dark urine    Doxycycline Itching     Urine turned black    Topamax [Topiramate] Itching     Rash, itching    Topamax [Topiramate] Hives    Verapamil Other (comments)     tachycardia    Verapamil Palpitations     Current Outpatient Medications   Medication Sig Dispense Refill    buPROPion SR (WELLBUTRIN SR) 150 mg SR tablet TAKE 1 TABLET BY MOUTH ONCE DAILY 90 Tab 0    busPIRone (BUSPAR) 10 mg tablet TAKE 1 TABLET BY MOUTH THREE TIMES DAILY 90 Tab 0    acetaminophen/diphenhydramine (TYLENOL PM PO) Take  by mouth.  testosterone cypionate (DEPOTESTOTERONE CYPIONATE) 200 mg/mL injection 0.5 mL by IntraMUSCular route every seven (7) days. Max Daily Amount: 100 mg. 10 mL 3    furosemide (LASIX) 20 mg tablet Take 10 mg by mouth daily.  hydrOXYzine HCl (ATARAX) 50 mg tablet Take 50 mg by mouth three (3) times daily as needed for Itching.       Needle, Disp, 21 G (HYPODERMIC NEEDLES) 21 gauge x 1\" ndle Inject 0.5ml intramuscular route every 7 days 10 Pen Needle 5    Syringe with Needle, Disp, (MONOJECT SAFETY SYRINGES) syrg To be used with medication 30 Syringe 3    esomeprazole (NEXIUM) 40 mg capsule Take 1 Cap by mouth daily. 180 Cap 3    lubiPROStone (AMITIZA) 24 mcg capsule Take  by mouth. PRN      lisinopril (PRINIVIL, ZESTRIL) 40 mg tablet Take 40 mg by mouth daily.  tamsulosin (FLOMAX) 0.4 mg capsule Take 0.4 mg by mouth daily.  metoprolol tartrate (LOPRESSOR) 25 mg tablet Take 12.5 mg by mouth every morning. 0.5 tab in a.m 0.5 tab in the evening      cholecalciferol (VITAMIN D3) 1,000 unit tablet Take  by mouth daily.  tiZANidine (ZANAFLEX) 4 mg capsule Take 4 mg by mouth every six (6) hours.  HYDROcodone-acetaminophen (NORCO)  mg tablet Take 1 Tab by mouth every twelve (12) hours as needed for Pain. Max Daily Amount: 2 Tabs. 60 Tab 0    amLODIPine (NORVASC) 5 mg tablet Take 1 Tab by mouth daily. 90 Tab 3    levothyroxine (SYNTHROID) 100 mcg tablet Take 1 Tab by mouth Daily (before breakfast). 90 Tab 3    acetaminophen (TYLENOL) 325 mg tablet Take 2 Tabs by mouth every six (6) hours as needed for Pain. 20 Tab 0       ROS:  Denies chills, fever,night sweats,  bowel or bladder dysfunction, unexplained weight loss/weight gain, chest pain, sob or anxiety. Physical Examination    Gen: Well developed, well nourished 75 y.o. male Visit Vitals  /84 (BP 1 Location: Left arm, BP Patient Position: Sitting)   Pulse (!) 59   Temp 97.5 °F (36.4 °C) (Oral)   Resp 14   Ht 6' 3\" (1.905 m)   Wt 289 lb 6.4 oz (131.3 kg)   BMI 36.17 kg/m²    PAIN SCALE: /10     CONSTITUTIONAL: The patient is in no apparent distress and is alert and oriented x 3. HEENT: Normocephalic. Hearing grossly intact. NECK: Supple and symmetric. no tenderness, or masses were felt. RESPIRATORY: No labored breathing. CARDIOVASCULAR: The carotid pulses were normal. Peripheral pulses were 2+.   CHEST: Normal AP diameter and normal contour without any kyphoscoliosis. LYMPHATIC: No lymphadenopathy was appreciated in the neck, axillae or groin. SKIN:  Negative for scars, rashes, lesions, or ulcers on the right upper, right lower, left upper, left lower and trunk. NEUROLOGICAL: Alert and oriented x 3. Ambulation without assistive device. FWB. EXTREMITIES:  See musculoskeletal.  MUSCULOSKELETAL:  · Head and Neck:  Negative for misalignment, asymmetry, crepitation, defects, tenderness masses or effusions. · Left Upper Extremity: Inspection, percussion and palpation performed. Posts sign is negative. · Right Upper Extremity: Inspection, percussion and palpation performed. Posts sign is negative. · Spine, Ribs and Pelvis: Back and hip pain. Inspection, percussion and palpation performed. Negative for misalignment, asymmetry, crepitation, defects, tenderness masses or effusions. · Left Lower Extremity: Inspection, percussion and palpation performed. Negative straight leg raise. · Right Lower Extremity: Inspection, percussion and palpation performed. Negative straight leg raise.        SPINE EXAM:      Lumbar spine: No rash, ecchymosis, or gross obliquity. No focal atrophy is noted. Assessment and Plan    Due to the pt's persistent symptoms unrelieved by conservative measure Kaley Wong. is being admitted to [facility] to undergo surgical intervention. The post-operative plan of care consists of physical therapy, home health and a 2 week f/u office visit. We are pending medical clearance by Dr. Lorie Kennedy and cardiac clearance from Dr. Wilder Velasco. The risks, benefits, complications and alternatives to surgery have been discussed in detail with the patient. The patient understands and agrees to proceed.      Rosario Mojica NP-C for Yolanda San MD

## 2019-10-04 ENCOUNTER — OFFICE VISIT (OUTPATIENT)
Dept: FAMILY MEDICINE CLINIC | Age: 64
End: 2019-10-04

## 2019-10-04 VITALS
HEIGHT: 75 IN | HEART RATE: 63 BPM | TEMPERATURE: 95.9 F | OXYGEN SATURATION: 99 % | WEIGHT: 284 LBS | SYSTOLIC BLOOD PRESSURE: 143 MMHG | DIASTOLIC BLOOD PRESSURE: 66 MMHG | RESPIRATION RATE: 18 BRPM | BODY MASS INDEX: 35.31 KG/M2

## 2019-10-04 DIAGNOSIS — I10 ESSENTIAL HYPERTENSION: ICD-10-CM

## 2019-10-04 DIAGNOSIS — Z98.890 HISTORY OF BACK SURGERY: ICD-10-CM

## 2019-10-04 DIAGNOSIS — E03.9 ACQUIRED HYPOTHYROIDISM: ICD-10-CM

## 2019-10-04 DIAGNOSIS — I48.20 CHRONIC ATRIAL FIBRILLATION (HCC): ICD-10-CM

## 2019-10-04 DIAGNOSIS — M48.062 SPINAL STENOSIS OF LUMBAR REGION WITH NEUROGENIC CLAUDICATION: ICD-10-CM

## 2019-10-04 DIAGNOSIS — Z96.89 S/P INSERTION OF SPINAL CORD STIMULATOR: ICD-10-CM

## 2019-10-04 DIAGNOSIS — G47.33 OSA ON CPAP: ICD-10-CM

## 2019-10-04 DIAGNOSIS — Z01.818 PREOPERATIVE EXAMINATION: Primary | ICD-10-CM

## 2019-10-04 DIAGNOSIS — G62.9 PERIPHERAL POLYNEUROPATHY: ICD-10-CM

## 2019-10-04 DIAGNOSIS — F33.1 MODERATE EPISODE OF RECURRENT MAJOR DEPRESSIVE DISORDER (HCC): ICD-10-CM

## 2019-10-04 DIAGNOSIS — Z99.89 OSA ON CPAP: ICD-10-CM

## 2019-10-04 DIAGNOSIS — F41.1 GAD (GENERALIZED ANXIETY DISORDER): ICD-10-CM

## 2019-10-04 NOTE — PROGRESS NOTES
Patient is here for pre op. 1. Have you been to the ER, urgent care clinic since your last visit? Hospitalized since your last visit?no    2. Have you seen or consulted any other health care providers outside of the [address] [place] [address] since your last visit? Include any pap smears or colon screening.  no

## 2019-10-04 NOTE — PROGRESS NOTES
HISTORY OF PRESENT ILLNESS  Rashi Mccullough is a 75 y.o. male. Patient is being seen as a medical consult at the request of Dr Maura Del Castillo secondary to ongoing peripheral neuropathy with spinal cord stimulator in place but slowly becoming dysfunctional a which will require  Revision of generator on 10/10/19. I have reviewed preoperative blood work, EKG. He is under the care of a cardiologist whom he recently saw and is currently on telemetry monitor. He was told it is safe for him to have the procedure done. Current chronic medical conditions are stable on medications. Patient  has had surgeries in the past and has never reported any adverse reactions with General Anesthesia. Pre-op Exam   The history is provided by the patient. This is a recurrent problem. The problem occurs constantly. The problem has been gradually worsening. Pertinent negatives include no chest pain, no abdominal pain, no headaches and no shortness of breath. The symptoms are aggravated by stress. Nothing relieves the symptoms. Back Pain    The history is provided by the patient. This is a chronic problem. The problem has been gradually worsening. The problem occurs constantly. Patient reports not work related injury. The pain is associated with twisting and lifting. The pain is present in the lumbar spine, right side and left side. The quality of the pain is described as stabbing and shooting. The pain is at a severity of 8/10. The pain is moderate. The pain is worse during the day. Stiffness is present all day. Associated symptoms include leg pain, paresthesias, paresis, tingling and weakness. Pertinent negatives include no chest pain, no fever, no numbness, no weight loss, no headaches, no abdominal pain, no bowel incontinence, no bladder incontinence, no dysuria and no pelvic pain. Review of Systems   Constitutional: Negative for chills, fever and weight loss.    HENT: Negative for congestion, ear pain, hearing loss, sinus pain and sore throat. Eyes: Negative for blurred vision, double vision, pain and discharge. Respiratory: Negative for cough, sputum production, shortness of breath and wheezing. Cardiovascular: Positive for leg swelling. Negative for chest pain and palpitations. Gastrointestinal: Negative for abdominal pain, blood in stool, bowel incontinence, nausea and vomiting. Genitourinary: Negative for bladder incontinence, dysuria, frequency, hematuria and pelvic pain. Musculoskeletal: Positive for back pain and joint pain. Neurological: Positive for tingling, weakness and paresthesias. Negative for tremors, focal weakness, numbness and headaches. Endo/Heme/Allergies: Negative for environmental allergies. Psychiatric/Behavioral: Positive for depression. The patient is nervous/anxious and has insomnia. Visit Vitals  /66   Pulse 63   Temp 95.9 °F (35.5 °C) (Oral)   Resp 18   Ht 6' 3\" (1.905 m)   Wt 284 lb (128.8 kg)   SpO2 99%   BMI 35.50 kg/m²       Physical Exam   Constitutional: He is oriented to person, place, and time. He appears well-nourished. No distress. HENT:   Head: Normocephalic and atraumatic. Right Ear: External ear normal.   Left Ear: External ear normal.   Mouth/Throat: Oropharynx is clear and moist. No oropharyngeal exudate. Eyes: Pupils are equal, round, and reactive to light. EOM are normal. No scleral icterus. Neck: Normal range of motion. No thyromegaly present. Cardiovascular: Normal rate, regular rhythm and normal heart sounds. Pulmonary/Chest: Effort normal and breath sounds normal. No respiratory distress. He has no wheezes. Abdominal: Soft. Bowel sounds are normal. He exhibits no distension. There is no tenderness. Musculoskeletal: He exhibits tenderness. Lumbar back: He exhibits decreased range of motion, tenderness, pain and spasm. Arms:  Lymphadenopathy:     He has no cervical adenopathy.    Neurological: He is alert and oriented to person, place, and time. Psychiatric: He has a normal mood and affect. ASSESSMENT and PLAN  Preoperative exam :  1) Reviewed preoperative blood work ,and EKG with patient which is stable at present  Patient has received clearance from cardiology with telemetry in place. 2) Ok to proceed with planned procedure. 3) Please stop all antiinflammatories and aspirin like products 5 days before surgery. 4) Please make sure you f/u with specialist after surgery. Lower back pain with stimulator in place:  - proceed with planned procedure    Hypertension :'1) Goal blood pressure less than equal to 140/90 mmHg, goal BP can vary depending on risk factors as discussed. 2) Lifestyle modifications discussed with patient, low sodium <2 gm, low salt , DASH diet  3) Exercise for at least 30 min 3-5 times a week for goal BMI of less than or equal  To 25.  4) Continue current medications as prescribed. 5) Please begin medication as discussed for better blood pressure control, explained side effects and patient verbalized understanding. 6) Goal LDL<100.  7) Please monitor your blood pressure and keep a log to bring in with you at each visit. 8) Discussed risk factors with patient such as CAD, FAST of stroke symptoms, pt verbalizes understanding. 9) Please avoid smoking , alcohol and any illicit drug use if applicable to you.   10) Discussed lifestyle modifications ,dietary control and BP monitoring at home     Chronic afib :  - not on anticoagulation at present   - stable       All other medical issues stable on meds

## 2019-10-08 NOTE — TELEPHONE ENCOUNTER
Per Dr Milli Silva after reviewing Event on 10/7/19:    Atrial flutter  Needs long term anticoagulation   Start Eliquis 5 mg BID  Show Dr Cliff Hines ASAP  Echocardiogram  Decrease Lisinopril to 20 mg every day  Increase Lopressor to 25 mg BID    Called and spoke with patient. Patient states understanding.  Echo scheduled for 11/4/19 @ 9:30 am

## 2019-10-09 ENCOUNTER — ANESTHESIA EVENT (OUTPATIENT)
Dept: SURGERY | Age: 64
End: 2019-10-09
Payer: MEDICARE

## 2019-10-10 ENCOUNTER — ANESTHESIA (OUTPATIENT)
Dept: SURGERY | Age: 64
End: 2019-10-10
Payer: MEDICARE

## 2019-10-10 ENCOUNTER — HOSPITAL ENCOUNTER (OUTPATIENT)
Age: 64
Discharge: HOME | End: 2019-10-10
Attending: ORTHOPAEDIC SURGERY | Admitting: ORTHOPAEDIC SURGERY
Payer: MEDICARE

## 2019-10-10 VITALS
HEIGHT: 75 IN | OXYGEN SATURATION: 100 % | HEART RATE: 57 BPM | RESPIRATION RATE: 20 BRPM | WEIGHT: 282.5 LBS | DIASTOLIC BLOOD PRESSURE: 85 MMHG | SYSTOLIC BLOOD PRESSURE: 133 MMHG | BODY MASS INDEX: 35.13 KG/M2 | TEMPERATURE: 98 F

## 2019-10-10 DIAGNOSIS — Z96.89 S/P INSERTION OF SPINAL CORD STIMULATOR: Primary | ICD-10-CM

## 2019-10-10 PROBLEM — T84.498A FAILED ORTHOPEDIC IMPLANT (HCC): Status: ACTIVE | Noted: 2019-10-10

## 2019-10-10 PROCEDURE — 74011250636 HC RX REV CODE- 250/636

## 2019-10-10 PROCEDURE — 76010000138 HC OR TIME 0.5 TO 1 HR: Performed by: ORTHOPAEDIC SURGERY

## 2019-10-10 PROCEDURE — 77030037337 HC KT REMOT CTRL FRELNK MRI BSC -G: Performed by: ORTHOPAEDIC SURGERY

## 2019-10-10 PROCEDURE — C1820 GENERATOR NEURO RECHG BAT SY: HCPCS | Performed by: ORTHOPAEDIC SURGERY

## 2019-10-10 PROCEDURE — 77030028990 HC ADH TISS DERMFLX CHMP -B: Performed by: ORTHOPAEDIC SURGERY

## 2019-10-10 PROCEDURE — 77030012893: Performed by: ORTHOPAEDIC SURGERY

## 2019-10-10 PROCEDURE — 77030002996 HC SUT SLK J&J -A: Performed by: ORTHOPAEDIC SURGERY

## 2019-10-10 PROCEDURE — 74011250636 HC RX REV CODE- 250/636: Performed by: NURSE PRACTITIONER

## 2019-10-10 PROCEDURE — 77030031139 HC SUT VCRL2 J&J -A: Performed by: ORTHOPAEDIC SURGERY

## 2019-10-10 PROCEDURE — 77030027138 HC INCENT SPIROMETER -A: Performed by: ORTHOPAEDIC SURGERY

## 2019-10-10 PROCEDURE — 77030030612 HC KT CHRG SYS PRECIS BSC -I: Performed by: ORTHOPAEDIC SURGERY

## 2019-10-10 PROCEDURE — 77030002933 HC SUT MCRYL J&J -A: Performed by: ORTHOPAEDIC SURGERY

## 2019-10-10 PROCEDURE — 74011250636 HC RX REV CODE- 250/636: Performed by: ORTHOPAEDIC SURGERY

## 2019-10-10 PROCEDURE — 77030040361 HC SLV COMPR DVT MDII -B: Performed by: ORTHOPAEDIC SURGERY

## 2019-10-10 PROCEDURE — 76210000026 HC REC RM PH II 1 TO 1.5 HR: Performed by: ORTHOPAEDIC SURGERY

## 2019-10-10 PROCEDURE — 77030003029 HC SUT VCRL J&J -B: Performed by: ORTHOPAEDIC SURGERY

## 2019-10-10 PROCEDURE — 76210000006 HC OR PH I REC 0.5 TO 1 HR: Performed by: ORTHOPAEDIC SURGERY

## 2019-10-10 PROCEDURE — 77030018846 HC SOL IRR STRL H20 ICUM -A: Performed by: ORTHOPAEDIC SURGERY

## 2019-10-10 PROCEDURE — 77030011265 HC ELECTRD BLD HEX COVD -A: Performed by: ORTHOPAEDIC SURGERY

## 2019-10-10 PROCEDURE — 76060000032 HC ANESTHESIA 0.5 TO 1 HR: Performed by: ORTHOPAEDIC SURGERY

## 2019-10-10 PROCEDURE — 74011250636 HC RX REV CODE- 250/636: Performed by: NURSE ANESTHETIST, CERTIFIED REGISTERED

## 2019-10-10 PROCEDURE — 74011000250 HC RX REV CODE- 250: Performed by: ORTHOPAEDIC SURGERY

## 2019-10-10 PROCEDURE — 77030018836 HC SOL IRR NACL ICUM -A: Performed by: ORTHOPAEDIC SURGERY

## 2019-10-10 PROCEDURE — 74011250636 HC RX REV CODE- 250/636: Performed by: ANESTHESIOLOGY

## 2019-10-10 PROCEDURE — 77030040922 HC BLNKT HYPOTHRM STRY -A: Performed by: ORTHOPAEDIC SURGERY

## 2019-10-10 DEVICE — GENERATOR IPG MRI SAFE SC-1200 -- PRECISION MONTAGE: Type: IMPLANTABLE DEVICE | Site: BACK | Status: FUNCTIONAL

## 2019-10-10 RX ORDER — MIDAZOLAM HYDROCHLORIDE 1 MG/ML
INJECTION, SOLUTION INTRAMUSCULAR; INTRAVENOUS AS NEEDED
Status: DISCONTINUED | OUTPATIENT
Start: 2019-10-10 | End: 2019-10-10 | Stop reason: HOSPADM

## 2019-10-10 RX ORDER — SODIUM CHLORIDE, SODIUM LACTATE, POTASSIUM CHLORIDE, CALCIUM CHLORIDE 600; 310; 30; 20 MG/100ML; MG/100ML; MG/100ML; MG/100ML
50 INJECTION, SOLUTION INTRAVENOUS CONTINUOUS
Status: DISCONTINUED | OUTPATIENT
Start: 2019-10-10 | End: 2019-10-10 | Stop reason: HOSPADM

## 2019-10-10 RX ORDER — VANCOMYCIN HYDROCHLORIDE 1 G/20ML
INJECTION, POWDER, LYOPHILIZED, FOR SOLUTION INTRAVENOUS AS NEEDED
Status: DISCONTINUED | OUTPATIENT
Start: 2019-10-10 | End: 2019-10-10 | Stop reason: HOSPADM

## 2019-10-10 RX ORDER — NALOXONE HYDROCHLORIDE 0.4 MG/ML
0.1 INJECTION, SOLUTION INTRAMUSCULAR; INTRAVENOUS; SUBCUTANEOUS AS NEEDED
Status: DISCONTINUED | OUTPATIENT
Start: 2019-10-10 | End: 2019-10-10 | Stop reason: HOSPADM

## 2019-10-10 RX ORDER — HYDROMORPHONE HYDROCHLORIDE 2 MG/ML
0.5 INJECTION, SOLUTION INTRAMUSCULAR; INTRAVENOUS; SUBCUTANEOUS
Status: DISCONTINUED | OUTPATIENT
Start: 2019-10-10 | End: 2019-10-10 | Stop reason: HOSPADM

## 2019-10-10 RX ORDER — SODIUM CHLORIDE 0.9 % (FLUSH) 0.9 %
5-40 SYRINGE (ML) INJECTION AS NEEDED
Status: DISCONTINUED | OUTPATIENT
Start: 2019-10-10 | End: 2019-10-10 | Stop reason: HOSPADM

## 2019-10-10 RX ORDER — BUPIVACAINE HYDROCHLORIDE AND EPINEPHRINE 5; 5 MG/ML; UG/ML
INJECTION, SOLUTION EPIDURAL; INTRACAUDAL; PERINEURAL AS NEEDED
Status: DISCONTINUED | OUTPATIENT
Start: 2019-10-10 | End: 2019-10-10 | Stop reason: HOSPADM

## 2019-10-10 RX ORDER — LISINOPRIL 20 MG/1
20 TABLET ORAL DAILY
Qty: 30 TAB | Refills: 6 | Status: SHIPPED | OUTPATIENT
Start: 2019-10-10 | End: 2020-01-27 | Stop reason: SDUPTHER

## 2019-10-10 RX ORDER — FENTANYL CITRATE 50 UG/ML
INJECTION, SOLUTION INTRAMUSCULAR; INTRAVENOUS AS NEEDED
Status: DISCONTINUED | OUTPATIENT
Start: 2019-10-10 | End: 2019-10-10 | Stop reason: HOSPADM

## 2019-10-10 RX ORDER — SODIUM CHLORIDE, SODIUM LACTATE, POTASSIUM CHLORIDE, CALCIUM CHLORIDE 600; 310; 30; 20 MG/100ML; MG/100ML; MG/100ML; MG/100ML
INJECTION, SOLUTION INTRAVENOUS
Status: DISCONTINUED | OUTPATIENT
Start: 2019-10-10 | End: 2019-10-10 | Stop reason: HOSPADM

## 2019-10-10 RX ORDER — DIPHENHYDRAMINE HYDROCHLORIDE 50 MG/ML
12.5 INJECTION, SOLUTION INTRAMUSCULAR; INTRAVENOUS
Status: DISCONTINUED | OUTPATIENT
Start: 2019-10-10 | End: 2019-10-10 | Stop reason: HOSPADM

## 2019-10-10 RX ORDER — FENTANYL CITRATE 50 UG/ML
50 INJECTION, SOLUTION INTRAMUSCULAR; INTRAVENOUS AS NEEDED
Status: DISCONTINUED | OUTPATIENT
Start: 2019-10-10 | End: 2019-10-10 | Stop reason: HOSPADM

## 2019-10-10 RX ORDER — LIDOCAINE HYDROCHLORIDE 10 MG/ML
0.1 INJECTION, SOLUTION EPIDURAL; INFILTRATION; INTRACAUDAL; PERINEURAL AS NEEDED
Status: DISCONTINUED | OUTPATIENT
Start: 2019-10-10 | End: 2019-10-10 | Stop reason: HOSPADM

## 2019-10-10 RX ORDER — CEFAZOLIN SODIUM 2 G/50ML
2 SOLUTION INTRAVENOUS ONCE
Status: COMPLETED | OUTPATIENT
Start: 2019-10-10 | End: 2019-10-10

## 2019-10-10 RX ORDER — SODIUM CHLORIDE 0.9 % (FLUSH) 0.9 %
5-40 SYRINGE (ML) INJECTION EVERY 8 HOURS
Status: DISCONTINUED | OUTPATIENT
Start: 2019-10-10 | End: 2019-10-10 | Stop reason: HOSPADM

## 2019-10-10 RX ORDER — SODIUM CHLORIDE, SODIUM LACTATE, POTASSIUM CHLORIDE, CALCIUM CHLORIDE 600; 310; 30; 20 MG/100ML; MG/100ML; MG/100ML; MG/100ML
75 INJECTION, SOLUTION INTRAVENOUS CONTINUOUS
Status: DISCONTINUED | OUTPATIENT
Start: 2019-10-10 | End: 2019-10-10 | Stop reason: HOSPADM

## 2019-10-10 RX ORDER — OXYCODONE AND ACETAMINOPHEN 5; 325 MG/1; MG/1
1 TABLET ORAL
Qty: 12 TAB | Refills: 0 | Status: SHIPPED | OUTPATIENT
Start: 2019-10-10 | End: 2019-10-13

## 2019-10-10 RX ORDER — METOPROLOL TARTRATE 25 MG/1
25 TABLET, FILM COATED ORAL 2 TIMES DAILY
Qty: 60 TAB | Refills: 6 | Status: SHIPPED | OUTPATIENT
Start: 2019-10-10 | End: 2020-01-27 | Stop reason: ALTCHOICE

## 2019-10-10 RX ORDER — PROPOFOL 10 MG/ML
INJECTION, EMULSION INTRAVENOUS
Status: DISCONTINUED | OUTPATIENT
Start: 2019-10-10 | End: 2019-10-10 | Stop reason: HOSPADM

## 2019-10-10 RX ORDER — CEFAZOLIN SODIUM 1 G/3ML
INJECTION, POWDER, FOR SOLUTION INTRAMUSCULAR; INTRAVENOUS AS NEEDED
Status: DISCONTINUED | OUTPATIENT
Start: 2019-10-10 | End: 2019-10-10 | Stop reason: HOSPADM

## 2019-10-10 RX ADMIN — PROPOFOL 50 MCG/KG/MIN: 10 INJECTION, EMULSION INTRAVENOUS at 15:02

## 2019-10-10 RX ADMIN — SODIUM CHLORIDE, SODIUM LACTATE, POTASSIUM CHLORIDE, CALCIUM CHLORIDE: 600; 310; 30; 20 INJECTION, SOLUTION INTRAVENOUS at 14:50

## 2019-10-10 RX ADMIN — CEFAZOLIN SODIUM 1 G: 1 INJECTION, POWDER, FOR SOLUTION INTRAMUSCULAR; INTRAVENOUS at 14:58

## 2019-10-10 RX ADMIN — FAMOTIDINE 20 MG: 10 INJECTION, SOLUTION INTRAVENOUS at 14:43

## 2019-10-10 RX ADMIN — MIDAZOLAM HYDROCHLORIDE 2 MG: 1 INJECTION, SOLUTION INTRAMUSCULAR; INTRAVENOUS at 14:50

## 2019-10-10 RX ADMIN — FENTANYL CITRATE 100 MCG: 50 INJECTION, SOLUTION INTRAMUSCULAR; INTRAVENOUS at 14:50

## 2019-10-10 RX ADMIN — SODIUM CHLORIDE, SODIUM LACTATE, POTASSIUM CHLORIDE, AND CALCIUM CHLORIDE 50 ML/HR: 600; 310; 30; 20 INJECTION, SOLUTION INTRAVENOUS at 13:50

## 2019-10-10 RX ADMIN — CEFAZOLIN 2 G: 10 INJECTION, POWDER, FOR SOLUTION INTRAVENOUS at 14:58

## 2019-10-10 NOTE — PERIOP NOTES
Patient discharged from facility via wheelchair. Patient's spouse has discharge instructions and prescriptions in hand. Patient armband removed and shredded.

## 2019-10-10 NOTE — DISCHARGE INSTRUCTIONS
Patient Education        Learning About Spinal Cord Stimulation  What is it? Spinal cord stimulation is a treatment for chronic pain. It uses a mild electrical current. It's mostly used for low back pain, pain in the arms and legs, and pain in the trunk. A small generator is placed in your body. It sends electrical pulses to a tiny electrode near your spinal cord. You may feel a tingle from the pulses. The pulses can help relieve pain. Why is it done? This treatment may be done for people with severe, chronic pain who have:  · Had back surgery that didn't help their pain. · Pain from a nerve problem. · Pain that does not respond to other treatments. This includes complex regional pain syndrome. · Pain from severe peripheral vascular disease that the doctor feels cannot be treated with surgery. If this treatment is right for you, you may have a spinal cord stimulator implanted for long-term use. How is it done? Spinal cord stimulation is done in two steps. Your doctor will first insert a temporary electrode through your skin. It will stay there for about a week. This first step is to see if the treatment will help your pain. You and your doctor will test different stimulation settings and programs. Your doctor will ask you how you feel at different settings. Let your doctor know if you feel any discomfort. You'll use a wireless remote control or other controller. If the test works, you may get a permanent stimulator. The electrode is implanted in your spine. A lead wire runs from your spine to a small generator. It can be under the skin in your lower or upper back, buttock area, chest, or belly area. You may get medicine that relaxes you or puts you in a light sleep. Some people may need to have general anesthesia. The areas being worked on will be numb. After the stimulator is placed, your doctor will show you how to care for the areas where you had surgery. Follow your doctor's instructions.  If you notice any signs of infection, call your doctor right away. These signs include pain, swelling, warmth or redness around the area, pus draining from the area, or a fever. What are the different types of spinal cord stimulators? This treatment targets the area of your body where you feel pain. There are different types of devices you may get. · The devices can have different power sources. ? Some generators have batteries that need to be replaced every 3 to 5 years. Some last longer. ? Some have rechargeable batteries. A special wireless  may come with your system. These last much longer. But they still may need to be replaced at some point. ? Some stimulators may have a power source outside your body. These are more often for short-term use. · The leads that carry the electrical current can be placed at different spots along the spinal cord. · You will have a controller to program the device. Your doctor will show you how to use it. How can you learn to live with a spinal cord stimulator? You may have a spinal cord stimulator for many years. It can help you live with much less pain, but you will have to learn how to use it. After the surgery, you and your doctor can figure out the best pulse strength. It may need to be adjusted a few times. Your doctor will show you how to use the stimulator at home. You may feel a tingle or some warmth while you use electrical nerve stimulation. Your doctor will show you how to be safe with a stimulator. This may include trying not to lift, bend, stretch, or twist too much. Being too active could move or disconnect the leads. Light exercise, such as walking, is good. After a few weeks, you will be able to move more. You may get important instructions on driving and air travel, as well. Your device may set off metal detectors. And anti-theft devices in stores can cause a burst of stimulation.   Be sure to tell other doctors about your stimulator before you have any other procedures or scans. Some scans and procedures can cause serious problems with your device. What are the risks? There are some risks to spinal cord stimulation. For example:  · Placing the stimulator requires surgery. Surgery has risks, such as the risk of bleeding, infection, or fluid buildup at the surgical site. Anesthesia also has some risks. · The stimulator device and wires can fail. The wires may also move, so the stimulator doesn't work as well as it should. · The tingling or warm feeling from the electrical current may bother you. You may need the device removed if it bothers you too much. · If you build up a tolerance to the stimulation, your doctor may need to change the amount of current or placement of the wires. · Your pain may come back, and the device may no longer work for you. Where can you learn more? Go to http://jamel-erika.info/. Enter S115 in the search box to learn more about \"Learning About Spinal Cord Stimulation. \"  Current as of: March 28, 2019  Content Version: 12.2  © 1029-1149 [employer]. Care instructions adapted under license by [employer] (which disclaims liability or warranty for this information). If you have questions about a medical condition or this instruction, always ask your healthcare professional. [address] any warranty or liability for your use of this information. Patient Education        Learning About What to Expect at Home After Surgery  What do you need to know when you leave the hospital after surgery? Each person recovers from surgery at a different pace. Your discharge plan will help you leave the hospital safely. It will outline the care you need. And it will give you information about the things you'll need to do at home. Make sure you get your plan in writing. Look for information on:  · What your medicines are and how to take them.   · When you need to see the doctor again or get any follow-up tests. · How and when to change bandages and dressings. · How active you can be. This may include physical therapy. · How to prevent falls. · What you can eat and can't eat. What do you need to know about taking medicines? Your doctor will talk with you about restarting your medicines. He or she will also tell you about taking any new medicines. · If you take aspirin or some other blood thinner, be sure to talk to your doctor. He or she will tell you if and when to start taking those medicines again. · If your doctor gave you a prescription medicine for pain, take it as prescribed. · If you aren't taking a prescription pain medicine, ask your doctor if you can take an over-the-counter medicine. How can you take care of your incision? If you have a cut (incision), follow your doctor's instructions to care for it. If you did not get instructions, follow this general advice:  · You will have a dressing over the cut. A dressing helps the incision heal and protects it. ? Change the bandage every day. ? If you have strips of tape on the cut, leave them on for a week or until they fall off.  ? If you have stitches or staples, your doctor will tell you when to come back to have them removed. ? If you have skin adhesive (liquid stitches) on the cut, leave it on until it falls off. · Wash the area daily with warm water, and pat it dry. Don't use hydrogen peroxide or alcohol. · You may shower 24 to 48 hours after surgery. Pat the incision dry. Don't swim or take a bath for the first 2 weeks, or until your doctor tells you it's okay. When can you be active again? One of the most important things you can do for yourself after surgery is to find ways to move. When you move as much as possible, even in bed, you are helping your body heal.  Here are some tips:  · Don't move quickly or lift anything heavy until you feel better.   · Taking short walks is a good way to help your body heal.  · Rest when you feel tired. Your doctor may give you instructions on when you can do your normal activities again, such as driving and going back to work. What do you need to know about eating? If your doctor told you when you can start eating and what foods you can eat, follow your doctor's instructions. If you did not get instructions, follow this general advice:  · You can eat your normal diet when you feel well. If your stomach is upset, try bland, low-fat foods. These include plain rice, broiled chicken, toast, and yogurt. · If your bowel movements aren't regular right after surgery, try to avoid constipation and straining. Drink plenty of water. Your doctor may suggest fiber, a stool softener, or a mild laxative. What do you do if you have infection or pain? If you have signs of infection, call your doctor. These signs include:  · Increased pain, swelling, warmth, or redness. · Red streaks leading from the incision. · Pus draining from the incision. · A fever. Also call your doctor if you have pain that does not get better after you take pain medicine. Follow-up care is a key part of your treatment and safety. Be sure to make and go to all appointments, and call your doctor if you are having problems. It's also a good idea to know your test results and keep a list of the medicines you take. Where can you learn more? Go to http://jamel-erika.info/. Jair Kumar in the search box to learn more about \"Learning About What to Expect at Home After Surgery. \"  Current as of: December 13, 2018  Content Version: 12.2  © 6562-7522 [employer]. Care instructions adapted under license by [employer] (which disclaims liability or warranty for this information).  If you have questions about a medical condition or this instruction, always ask your healthcare professional. [address] any warranty or liability for your use of this information. DISCHARGE SUMMARY from Nurse    PATIENT INSTRUCTIONS:    After general anesthesia or intravenous sedation, for 24 hours or while taking prescription Narcotics:  · Limit your activities  · Do not drive and operate hazardous machinery  · Do not make important personal or business decisions  · Do  not drink alcoholic beverages  · If you have not urinated within 8 hours after discharge, please contact your surgeon on call. Report the following to your surgeon:  · Excessive pain, swelling, redness or odor of or around the surgical area  · Temperature over 100.5  · Nausea and vomiting lasting longer than 4 hours or if unable to take medications  · Any signs of decreased circulation or nerve impairment to extremity: change in color, persistent  numbness, tingling, coldness or increase pain  · Any questions    What to do at Home:  Recommended activity: Activity as tolerated and no driving for today and No driving while on analgesics. *  Please give a list of your current medications to your Primary Care Provider. *  Please update this list whenever your medications are discontinued, doses are      changed, or new medications (including over-the-counter products) are added. *  Please carry medication information at all times in case of emergency situations. These are general instructions for a healthy lifestyle:    No smoking/ No tobacco products/ Avoid exposure to second hand smoke  Surgeon General's Warning:  Quitting smoking now greatly reduces serious risk to your health.     Obesity, smoking, and sedentary lifestyle greatly increases your risk for illness    A healthy diet, regular physical exercise & weight monitoring are important for maintaining a healthy lifestyle    You may be retaining fluid if you have a history of heart failure or if you experience any of the following symptoms:  Weight gain of 3 pounds or more overnight or 5 pounds in a week, increased swelling in our hands or feet or shortness of breath while lying flat in bed. Please call your doctor as soon as you notice any of these symptoms; do not wait until your next office visit. The discharge information has been reviewed with the patient and spouse. The patient and spouse verbalized understanding. Discharge medications reviewed with the patient and spouse and appropriate educational materials and side effects teaching were provided. ___________________________________________________________________________________________________________________________________    Patient Education      Narcotic-Analgesic/Acetaminophen (Percocet, [address], [facility], Lortab 10/325) - (By mouth)   Why this medicine is used:   Relieves pain. Contact a nurse or doctor right away if you have:  · Extreme weakness, shallow breathing, slow heartbeat  · Severe confusion, lightheadedness, dizziness, fainting  · Yellow skin or eyes, dark urine or pale stools  · Severe constipation, severe stomach pain, nausea, vomiting, loss of appetite  · Sweating or cold, clammy skin     Common side effects:  · Mild constipation, nausea, vomiting  · Sleepiness, tiredness  · Itching, rash  © 2017 [facility] Information is for End User's use only and may not be sold, redistributed or otherwise used for commercial purposes.

## 2019-10-10 NOTE — ANESTHESIA POSTPROCEDURE EVALUATION
Procedure(s):  REVISE SPINAL CORD STIMULATOR GENERATOR/C-ARM/[employer]. MAC    Anesthesia Post Evaluation      Multimodal analgesia: multimodal analgesia used between 6 hours prior to anesthesia start to PACU discharge  Patient location during evaluation: bedside  Patient participation: complete - patient participated  Level of consciousness: awake  Pain score: 0  Pain management: adequate  Airway patency: patent  Anesthetic complications: no  Cardiovascular status: stable  Respiratory status: acceptable  Hydration status: acceptable  Post anesthesia nausea and vomiting:  controlled      Vitals Value Taken Time   /85 10/10/2019  4:01 PM   Temp 36.5 °C (97.7 °F) 10/10/2019  3:33 PM   Pulse 53 10/10/2019  4:11 PM   Resp 19 10/10/2019  4:11 PM   SpO2 100 % 10/10/2019  4:11 PM   Vitals shown include unvalidated device data.

## 2019-10-10 NOTE — INTERVAL H&P NOTE
H&P Update:  Siomara Vu. was seen and examined. History and physical has been reviewed. The patient has been examined.  There have been no significant clinical changes since the completion of the originally dated History and Physical.

## 2019-10-10 NOTE — BRIEF OP NOTE
BRIEF OPERATIVE NOTE    Date of Procedure: 10/10/2019   Preoperative Diagnosis: M96.1, T85.192A FAILED SCS GENERATOR  Postoperative Diagnosis: * No post-op diagnosis entered *    Procedure(s):  REVISE SPINAL CORD STIMULATOR GENERATOR/C-ARM/[employer]  Surgeon(s) and Role:     Mary Gray MD - Primary         Surgical Assistant: 0    Surgical Staff:  Circ-1: Kristin Ly RN  Circ-2: Nadege Valverde RN  Scrub Tech-1: Tre Daley  Surg Asst-1: Crystal Ormond, Aric B  Event Time In Time Out   Incision Start 1507    Incision Close       Anesthesia: General   Estimated Blood Loss: 0  Specimens: * No specimens in log *   Findings: failed stim generator   Complications: 0  Implants: * No implants in log *

## 2019-10-10 NOTE — ANESTHESIA PREPROCEDURE EVALUATION
Relevant Problems   No relevant active problems       Anesthetic History               Review of Systems / Medical History  Patient summary reviewed and pertinent labs reviewed    Pulmonary        Sleep apnea: CPAP           Neuro/Psych         Psychiatric history  Pertinent negatives: Headaches: depression.    Cardiovascular    Hypertension: well controlled        Dysrhythmias : atrial fibrillation           GI/Hepatic/Renal                Endo/Other      Hypothyroidism: well controlled  Morbid obesity     Other Findings   Comments: H/O left parotid cancer-s/p Surgery and Radiation           Physical Exam    Airway  Mallampati: III  TM Distance: 4 - 6 cm  Neck ROM: decreased range of motion   Mouth opening: Diminished (comment)     Cardiovascular  Regular rate and rhythm,  S1 and S2 normal,  no murmur, click, rub, or gallop             Dental      Comments: Several teeth missing   Pulmonary  Breath sounds clear to auscultation               Abdominal  GI exam deferred       Other Findings            Anesthetic Plan    ASA: 3  Anesthesia type: MAC          Induction: Intravenous  Anesthetic plan and risks discussed with: Patient

## 2019-10-11 RX ORDER — BUPROPION HYDROCHLORIDE 150 MG/1
TABLET, EXTENDED RELEASE ORAL
Qty: 90 TAB | Refills: 0 | Status: SHIPPED | OUTPATIENT
Start: 2019-10-11

## 2019-10-11 NOTE — OP NOTES
[address] [place] [address]   OPERATIVE REPORT    Name:  Alda Beal  MR#:   [id number]  :  1955  ACCOUNT #:  [de-identified]  DATE OF SERVICE:  10/10/2019    PREOPERATIVE DIAGNOSIS:  Failed spinal cord stimulator generator and chronic pain. POSTOPERATIVE DIAGNOSIS:  Failed spinal cord stimulator generator and chronic pain. PROCEDURE PERFORMED:  Revision, spinal cord stimulator generator, [employer] type. SURGEON:  Rosanna Modi MD    ASSISTANT:  None. ANESTHESIA:  mac    COMPLICATIONS:  None. SPECIMENS REMOVED:  None. IMPLANTS:  [employer] spinal cord stimulator generator. ESTIMATED BLOOD LOSS:  0 mL. PROCEDURE:  Following IV sedation, the patient was turned to prone position on a spinal frame, prepped and draped in the usual fashion. The previous incision was marked, and the incision was anesthetized with 0.5% lidocaine solution. Incision was made down to the generator. Generator was dissected out bluntly. The locking screws loosened, and the leads removed and cleaned. The leads were placed within the new generator. Continuity test done and confirmed. Torque screwdriver was used to torque the leads into place, and continuity test repeated and confirmed. New generator was placed within the pocket, writing side out. Vancomycin powder placed within the pocket for infection prophylaxis. The wound was closed in layers, and the skin closed with a subcuticular suture and Dermabond. A sterile occlusive dressing was placed upon the wound. All counts were correct.       MD MISSY [id number]  D:  10/10/2019 15:29  T:  10/11/2019 0:13  JOB #:  [id number]

## 2019-10-20 RX ORDER — BUSPIRONE HYDROCHLORIDE 10 MG/1
TABLET ORAL
Qty: 90 TAB | Refills: 0 | Status: SHIPPED | OUTPATIENT
Start: 2019-10-20 | End: 2019-11-25 | Stop reason: SDUPTHER

## 2019-10-22 ENCOUNTER — OFFICE VISIT (OUTPATIENT)
Dept: ORTHOPEDIC SURGERY | Age: 64
End: 2019-10-22

## 2019-10-22 ENCOUNTER — OFFICE VISIT (OUTPATIENT)
Dept: FAMILY MEDICINE CLINIC | Age: 64
End: 2019-10-22

## 2019-10-22 ENCOUNTER — HOSPITAL ENCOUNTER (OUTPATIENT)
Dept: LAB | Age: 64
Discharge: HOME | End: 2019-10-22
Payer: MEDICARE

## 2019-10-22 VITALS
OXYGEN SATURATION: 100 % | TEMPERATURE: 95.1 F | DIASTOLIC BLOOD PRESSURE: 65 MMHG | HEART RATE: 43 BPM | SYSTOLIC BLOOD PRESSURE: 132 MMHG | BODY MASS INDEX: 35.84 KG/M2 | HEIGHT: 75 IN | RESPIRATION RATE: 12 BRPM | WEIGHT: 288.2 LBS

## 2019-10-22 VITALS
SYSTOLIC BLOOD PRESSURE: 133 MMHG | RESPIRATION RATE: 15 BRPM | DIASTOLIC BLOOD PRESSURE: 81 MMHG | HEIGHT: 75 IN | TEMPERATURE: 98.4 F | BODY MASS INDEX: 36.02 KG/M2 | HEART RATE: 50 BPM

## 2019-10-22 DIAGNOSIS — F33.0 DEPRESSION, MAJOR, RECURRENT, MILD (HCC): ICD-10-CM

## 2019-10-22 DIAGNOSIS — M96.1 LUMBAR POST-LAMINECTOMY SYNDROME: ICD-10-CM

## 2019-10-22 DIAGNOSIS — T85.192A FAILURE OF SPINAL CORD STIMULATOR, INITIAL ENCOUNTER (HCC): Primary | ICD-10-CM

## 2019-10-22 DIAGNOSIS — Z00.00 MEDICARE ANNUAL WELLNESS VISIT, SUBSEQUENT: Primary | ICD-10-CM

## 2019-10-22 DIAGNOSIS — Z11.59 NEED FOR HEPATITIS C SCREENING TEST: ICD-10-CM

## 2019-10-22 PROBLEM — Z45.42 BATTERY END OF LIFE OF SPINAL CORD STIMULATOR: Status: ACTIVE | Noted: 2019-10-22

## 2019-10-22 PROCEDURE — 86803 HEPATITIS C AB TEST: CPT

## 2019-10-22 NOTE — PROGRESS NOTES
Jack Whitlock. presents today for   Chief Complaint   Patient presents with    Back Pain     POST OP    Leg Pain       Is someone accompanying this pt? NO    Is the patient using any DME equipment during OV? NO    Depression Screening:  3 most recent PHQ Screens 10/22/2019   PHQ Not Done Active Diagnosis of Depression or Bipolar Disorder   Little interest or pleasure in doing things Not at all   Feeling down, depressed, irritable, or hopeless -   Total Score PHQ 2 -   Trouble falling or staying asleep, or sleeping too much -   Feeling tired or having little energy -   Poor appetite, weight loss, or overeating -   Feeling bad about yourself - or that you are a failure or have let yourself or your family down -   Trouble concentrating on things such as school, work, reading, or watching TV -   Moving or speaking so slowly that other people could have noticed; or the opposite being so fidgety that others notice -   Thoughts of being better off dead, or hurting yourself in some way -   PHQ 9 Score -   How difficult have these problems made it for you to do your work, take care of your home and get along with others -         Abuse Screening:  Abuse Screening Questionnaire 10/22/2019   Do you ever feel afraid of your partner? N   Are you in a relationship with someone who physically or mentally threatens you? N   Is it safe for you to go home? Y       Fall Risk  Fall Risk Assessment, last 12 mths 10/22/2019   Able to walk? Yes   Fall in past 12 months? Yes   Fall with injury? No   Number of falls in past 12 months 2   Fall Risk Score 2         Coordination of Care:  1. Have you been to the ER, urgent care clinic since your last visit? NO  Hospitalized since your last visit? NO    2. Have you seen or consulted any other health care providers outside of the [address] [place] [address] [facility] since your last visit? NO Include any pap smears or colon screening.  NO

## 2019-10-22 NOTE — PROGRESS NOTES
HISTORY OF PRESENT ILLNESS  Jack Antunez is a 75 y.o. male. Patient is here for his subsequent medicare wellness visit. He had the flu shot and is due for pneumonia vaccine which he will  Get from the pharmacy. He is also due to have a colonoscopy which is postponed at present due to being on a blood thinner. I will order hep C screening today . He is due for eye exam next year some time in March. Annual Exam   The history is provided by the patient. This is a new problem. The problem occurs constantly. The problem has been gradually improving. Pertinent negatives include no chest pain, no abdominal pain, no headaches and no shortness of breath. The symptoms are aggravated by stress. Nothing relieves the symptoms. He has tried nothing for the symptoms. Review of Systems   Constitutional: Negative for diaphoresis, fever, malaise/fatigue and weight loss. HENT: Negative for ear discharge, hearing loss, nosebleeds and sinus pain. Eyes: Positive for blurred vision. Negative for double vision. Respiratory: Negative for cough, sputum production and shortness of breath. Cardiovascular: Negative for chest pain, palpitations and leg swelling. Gastrointestinal: Negative for abdominal pain, blood in stool, constipation, nausea and vomiting. Genitourinary: Negative for dysuria, frequency and hematuria. Musculoskeletal: Positive for back pain, joint pain and neck pain. Back pain improving after  Recent surgery. Neurological: Negative for dizziness, tingling, focal weakness, weakness and headaches. Endo/Heme/Allergies: Negative for environmental allergies. Psychiatric/Behavioral: Positive for depression. Negative for hallucinations, substance abuse and suicidal ideas. The patient is nervous/anxious.       Visit Vitals  /65   Pulse (!) 43   Temp 95.1 °F (35.1 °C) (Oral)   Resp 12   Ht 6' 3\" (1.905 m)   Wt 288 lb 3.2 oz (130.7 kg)   SpO2 100%   BMI 36.02 kg/m²       Physical Exam Constitutional: He is oriented to person, place, and time. He appears well-developed and well-nourished. No distress. HENT:   Head: Normocephalic and atraumatic. Right Ear: External ear normal.   Left Ear: External ear normal.   Eyes: Pupils are equal, round, and reactive to light. EOM are normal. No scleral icterus. Neck: Normal range of motion. No thyromegaly present. Cardiovascular: Normal rate, regular rhythm and normal heart sounds. Pulmonary/Chest: Effort normal and breath sounds normal. No respiratory distress. He has no wheezes. Abdominal: Soft. Bowel sounds are normal. He exhibits no distension. There is no tenderness. Musculoskeletal: He exhibits tenderness. Arms:  Scar from lower back surgery healing up. Lymphadenopathy:     He has no cervical adenopathy. Neurological: He is alert and oriented to person, place, and time. Psychiatric: He has a normal mood and affect. This is the Subsequent Medicare Annual Wellness Exam, performed 12 months or more after the Initial AWV or the last Subsequent AWV    I have reviewed the patient's medical history in detail and updated the computerized patient record.      History     Past Medical History:   Diagnosis Date    A-fib 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    Cancer ([facility] [address]     Depression     Endocrine disease     Hypothyroidism    Fractures 1978, 2000    ankle left, left wrist    Headache     Hearing loss 2013    Hypertension     Ill-defined condition     Pain stimulator in back    Ill-defined condition     irregular heartbeat    Nausea & vomiting     Psychiatric disorder     depression    Sleep apnea     on cpap    Thyroid disease       Past Surgical History:   Procedure Laterality Date    HX COLONOSCOPY      HX HEENT  2003    Reconstructive surgery on face    HX KNEE REPLACEMENT  2015/2016    bilat knees    HX ORTHOPAEDIC 4949-4445    back surgery X4     Current Outpatient Medications   Medication Sig Dispense Refill    busPIRone (BUSPAR) 10 mg tablet TAKE 1 TABLET BY MOUTH THREE TIMES DAILY 90 Tab 0    buPROPion SR (WELLBUTRIN SR) 150 mg SR tablet TAKE 1 TABLET BY MOUTH ONCE DAILY 90 Tab 0    apixaban (ELIQUIS) 5 mg tablet Take 1 Tab by mouth two (2) times a day. 60 Tab 3    lisinopril (PRINIVIL, ZESTRIL) 20 mg tablet Take 1 Tab by mouth daily. 30 Tab 6    metoprolol tartrate (LOPRESSOR) 25 mg tablet Take 1 Tab by mouth two (2) times a day. 60 Tab 6    linaCLOtide (LINZESS) 72 mcg cap capsule Take 1 Cap by mouth daily. 90 Cap 3    acetaminophen/diphenhydramine (TYLENOL PM PO) Take  by mouth.  testosterone cypionate (DEPOTESTOTERONE CYPIONATE) 200 mg/mL injection 0.5 mL by IntraMUSCular route every seven (7) days. Max Daily Amount: 100 mg. 10 mL 3    furosemide (LASIX) 20 mg tablet Take 10 mg by mouth daily.  hydrOXYzine HCl (ATARAX) 50 mg tablet Take 50 mg by mouth three (3) times daily as needed for Itching.  Needle, Disp, 21 G (HYPODERMIC NEEDLES) 21 gauge x 1\" ndle Inject 0.5ml  intramuscular route every 7 days 10 Pen Needle 5    Syringe with Needle, Disp, (MONOJECT SAFETY SYRINGES) syrg To be used with medication 30 Syringe 3    esomeprazole (NEXIUM) 40 mg capsule Take 1 Cap by mouth daily. 180 Cap 3    cholecalciferol (VITAMIN D3) 1,000 unit tablet Take  by mouth daily.  tiZANidine (ZANAFLEX) 4 mg capsule Take 4 mg by mouth as needed.  HYDROcodone-acetaminophen (NORCO)  mg tablet Take 1 Tab by mouth every twelve (12) hours as needed for Pain. Max Daily Amount: 2 Tabs. 60 Tab 0    amLODIPine (NORVASC) 5 mg tablet Take 1 Tab by mouth daily. 90 Tab 3    levothyroxine (SYNTHROID) 100 mcg tablet Take 1 Tab by mouth Daily (before breakfast). 90 Tab 3    acetaminophen (TYLENOL) 325 mg tablet Take 2 Tabs by mouth every six (6) hours as needed for Pain.  20 Tab 0    tamsulosin (FLOMAX) 0.4 mg capsule Take 0.4 mg by mouth daily.        Allergies   Allergen Reactions    Latex Itching    Codeine Rash    Codeine Hives    Doxycycline Other (comments)     Dark urine    Doxycycline Itching     Urine turned black    Topamax [Topiramate] Itching     Rash, itching    Topamax [Topiramate] Hives    Verapamil Other (comments)     tachycardia    Verapamil Palpitations     Family History   Problem Relation Age of Onset    Arthritis-osteo Mother     Bleeding Prob Mother     Cancer Mother     Headache Mother     Hypertension Mother     Migraines Mother     Stroke Mother 68    Arthritis-osteo Father     Stroke Father 80    Arthritis-osteo Sister     Cancer Sister     Migraines Sister     Arthritis-osteo Brother     Cancer Brother     Headache Brother     Migraines Brother     Stroke Paternal Uncle     Heart Surgery Paternal Uncle 72    Diabetes Other      Social History     Tobacco Use    Smoking status: Former Smoker     Last attempt to quit:      Years since quittin.8    Smokeless tobacco: Never Used   Substance Use Topics    Alcohol use: No     Frequency: Never     Patient Active Problem List   Diagnosis Code    Severe obesity ([facility] [address] E66.01    Failed orthopedic implant ([facility] [address] T84.498A    Depression, major, recurrent, mild ([facility] [address] F33.0       Depression Risk Factor Screening:     3 most recent PHQ Screens 10/22/2019   PHQ Not Done Active Diagnosis of Depression or Bipolar Disorder   Little interest or pleasure in doing things Not at all   Feeling down, depressed, irritable, or hopeless -   Total Score PHQ 2 -   Trouble falling or staying asleep, or sleeping too much -   Feeling tired or having little energy -   Poor appetite, weight loss, or overeating -   Feeling bad about yourself - or that you are a failure or have let yourself or your family down -   Trouble concentrating on things such as school, work, reading, or watching TV -   Moving or speaking so slowly that other people could have noticed; or the opposite being so fidgety that others notice -   Thoughts of being better off dead, or hurting yourself in some way -   PHQ 9 Score -   How difficult have these problems made it for you to do your work, take care of your home and get along with others -     Alcohol Risk Factor Screening:   Social drinker     Functional Ability and Level of Safety:   Hearing Loss  Good hearing on whisper testing , 2 feel behind the patient. Activities of Daily Living  The home contains: no safety equipment. Patient does total self care    Fall Risk  Fall Risk Assessment, last 12 mths 10/22/2019   Able to walk? Yes   Fall in past 12 months? Yes   Fall with injury? No   Number of falls in past 12 months 2   Fall Risk Score 2       Abuse Screen  Patient is not abused    Cognitive Screening   Evaluation of Cognitive Function:  Has your family/caregiver stated any concerns about your memory: no  Normal    Patient Care Team   Patient Care Team:  Dennie Patricia, MD as PCP - General (Family Practice)  None  Karen Donald MD (Orthopedic Surgery)    Assessment/Plan   Education and counseling provided:  Are appropriate based on today's review and evaluation  End-of-Life planning (with patient's consent)  Pneumococcal Vaccine  Influenza Vaccine  Prostate cancer screening tests (PSA, covered annually)  Colorectal cancer screening tests  Cardiovascular screening blood test  Bone mass measurement (DEXA)  Screening for glaucoma  Diabetes screening test    Diagnoses and all orders for this visit:    1. Medicare annual wellness visit, subsequent    2. Depression, major, recurrent, mild ([facility] [address]    3. Need for hepatitis C screening test  -     HEPATITIS C AB; Future        Health Maintenance Due   Topic Date Due    Hepatitis C Screening  1955     Subsequent medicare wellness exam;  1) Please make sure you have a routine physical exam every 1-2 years.   2) Annual check up with eye doctor and dentist.  3) Colorectal cancer screening with colonoscopy every ten years at age 46. Depending on certain risk factors screening may need to be done earlier. 4) Rectal exam and PSA screening at age of 46, screening may be done earlier depending on certain risk factors as discussed.    ) Bone density testing starting at the age of 72.   ) Routine blood work to be ordered as part of physical exam and has been discussed with patient.  ) Screening for STD's/HIV.  ) Exercise at least 30 min 3-5 times a week for goal BMI of less than or equal to 25.    Please make sure you wear a seat belt while driving daily , helmet safety discussed.  ) Please avoid smoking , alcohol and illicit drug use.  ) Daily requirement of calcium is 1200 mg per day and 1000 IU of vitamin D.  ) Please make sure all immunizations are up to date:       - Influenza vaccine every year        - Tdap every 10 years       - Pneumococcal vaccine starting at age of 72       - Shingles at age 59     Hep sc screening ordered

## 2019-10-22 NOTE — PROGRESS NOTES
Chief complaint/History of Present Illness:  Chief Complaint   Patient presents with    Back Pain     POST OP    Leg Pain     HPI  Francia Wang is a  75 y.o.  male      HISTORY OF PRESENT ILLNESS:  The patient comes in today two weeks status post his revision spinal cord stimulator done October 10, 2019. He states it is working good. It is charging well. It is working well with the spinal cord stimulator. He has not had any incisional problems, no fever, no drainage. He has finished taking the Percocet for his acute postop pain and is now taking his Norco that he gets through pain management with Dr. Nnamdi Vera. He denies fever and bowel and bladder dysfunction. PHYSICAL EXAM:  Mr. Jonatan Kim is a 68-year-old male. He is alert and oriented. He has a normal mood and affect. He has a full weightbearing, non-antalgic gait using no assistive device. He has 4/5 strength of the bilateral lower extremities and negative straight leg raise. His right flank incision is healing nicely. The edge is well-approximated. There is no erythema, warmth, drainage, or signs of infection. ASSESSMENT/PLAN:  This is a patient two weeks out from his revision spinal cord stimulator generator, [employer] type. We are trying to get a hold of the rep with them, so they can reprogram the generator. We went over wound care and activity level. We will see him back in four weeks with Dr. Lauren John.        Review of systems:    Past Medical History:   Diagnosis Date    A-fib 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    Cancer ([facility] [address]     Depression     Endocrine disease     Hypothyroidism    Fractures 1978, 2000    ankle left, left wrist    Headache     Hearing loss 2013    Hypertension     Ill-defined condition     Pain stimulator in back    Ill-defined condition     irregular heartbeat    Nausea & vomiting     Psychiatric disorder     depression    Sleep apnea     on cpap    Thyroid disease      Past Surgical History:   Procedure Laterality Date    HX BACK SURGERY  10/10/2019    Revision SCS    HX COLONOSCOPY      HX HEENT  2003    Reconstructive surgery on face    HX KNEE REPLACEMENT      bilat knees    HX ORTHOPAEDIC  [id number]    back surgery X4     Social History     Socioeconomic History    Marital status:      Spouse name: Not on file    Number of children: Not on file    Years of education: Not on file    Highest education level: Not on file   Occupational History    Not on file   Social Needs    Financial resource strain: Not on file    Food insecurity:     Worry: Not on file     Inability: Not on file    Transportation needs:     Medical: Not on file     Non-medical: Not on file   Tobacco Use    Smoking status: Former Smoker     Last attempt to quit: 1987     Years since quittin.8    Smokeless tobacco: Never Used   Substance and Sexual Activity    Alcohol use: No     Frequency: Never    Drug use: No    Sexual activity: Never     Partners: Female   Lifestyle    Physical activity:     Days per week: Not on file     Minutes per session: Not on file    Stress: Not on file   Relationships    Social connections:     Talks on phone: Not on file     Gets together: Not on file     Attends Sabianist service: Not on file     Active member of club or organization: Not on file     Attends meetings of clubs or organizations: Not on file     Relationship status: Not on file    Intimate partner violence:     Fear of current or ex partner: Not on file     Emotionally abused: Not on file     Physically abused: Not on file     Forced sexual activity: Not on file   Other Topics Concern   [address] Service Not Asked    Blood Transfusions Not Asked    Caffeine Concern Not Asked    Occupational Exposure Not Asked   [place] Hazards Not Asked    Sleep Concern Not Asked    Stress Concern Not Asked    Weight Concern Not Asked    Special Diet Not Asked    Back Care Not Asked    Exercise Not Asked    Bike Helmet Not Asked   [address] Not Asked    Self-Exams Not Asked   Social History Narrative    ** Merged History Encounter **          Family History   Problem Relation Age of Onset    Arthritis-osteo Mother     Bleeding Prob Mother     Cancer Mother     Headache Mother     Hypertension Mother     Migraines Mother     Stroke Mother 68    Arthritis-osteo Father     Stroke Father 80    Arthritis-osteo Sister     Cancer Sister     Migraines Sister     Arthritis-osteo Brother     Cancer Brother     Headache Brother     Migraines Brother     Stroke Paternal Uncle     Heart Surgery Paternal Uncle 72    Diabetes Other        Physical Exam:  Visit Vitals  /81 (BP 1 Location: Left arm, BP Patient Position: Sitting)   Pulse (!) 50   Temp 98.4 °F (36.9 °C) (Temporal)   Resp 15   Ht 6' 3\" (1.905 m)   BMI 36.02 kg/m²     Pain Scale: 5/10       has been . reviewed and is appropriate          Diagnoses and all orders for this visit:    1. Failure of spinal cord stimulator, initial encounter ([facility] [address]    2. Lumbar post-laminectomy syndrome            Follow-up and Dispositions    · Return in about 4 weeks (around 11/19/2019) for with Dr. Alie Melendez.              We have informed Una Logan. to notify us for immediate appointment if he has any worsening neurogical symptoms or if an emergency situation presents, then call [phone]

## 2019-10-22 NOTE — PATIENT INSTRUCTIONS
Preventing Falls: Care Instructions Your Care Instructions Getting around your home safely can be a challenge if you have injuries or health problems that make it easy for you to fall. Loose rugs and furniture in walkways are among the dangers for many older people who have problems walking or who have poor eyesight. People who have conditions such as arthritis, osteoporosis, or dementia also have to be careful not to fall. You can make your home safer with a few simple measures. Follow-up care is a key part of your treatment and safety. Be sure to make and go to all appointments, and call your doctor if you are having problems. It's also a good idea to know your test results and keep a list of the medicines you take. How can you care for yourself at home? Taking care of yourself · You may get dizzy if you do not drink enough water. To prevent dehydration, drink plenty of fluids, enough so that your urine is light yellow or clear like water. Choose water and other caffeine-free clear liquids. If you have kidney, heart, or liver disease and have to limit fluids, talk with your doctor before you increase the amount of fluids you drink. · Exercise regularly to improve your strength, muscle tone, and balance. Walk if you can. Swimming may be a good choice if you cannot walk easily. · Have your vision and hearing checked each year or any time you notice a change. If you have trouble seeing and hearing, you might not be able to avoid objects and could lose your balance. · Know the side effects of the medicines you take. Ask your doctor or pharmacist whether the medicines you take can affect your balance. Sleeping pills or sedatives can affect your balance. · Limit the amount of alcohol you drink. Alcohol can impair your balance and other senses. · Ask your doctor whether calluses or corns on your feet need to be removed.  If you wear loose-fitting shoes because of calluses or corns, you can lose your balance and fall. · Talk to your doctor if you have numbness in your feet. Preventing falls at home · Remove raised doorway thresholds, throw rugs, and clutter. Repair loose carpet or raised areas in the floor. · Move furniture and electrical cords to keep them out of walking paths. · Use nonskid floor wax, and wipe up spills right away, especially on ceramic tile floors. · If you use a walker or cane, put rubber tips on it. If you use crutches, clean the bottoms of them regularly with an abrasive pad, such as steel wool. · Keep your house well lit, especially [facility], and outside walkways. Use night-lights in areas such as hallways and bathrooms. Add extra light switches or use remote switches (such as switches that go on or off when you clap your hands) to make it easier to turn lights on if you have to get up during the night. · Install sturdy handrails on stairways. · Move items in your cabinets so that the things you use a lot are on the lower shelves (about waist level). · Keep a cordless phone and a flashlight with new batteries by your bed. If possible, put a phone in each of the main rooms of your house, or carry a cell phone in case you fall and cannot reach a phone. Or, you can wear a device around your neck or wrist. You push a button that sends a signal for help. · Wear low-heeled shoes that fit well and give your feet good support. Use footwear with nonskid soles. Check the heels and soles of your shoes for wear. Repair or replace worn heels or soles. · Do not wear socks without shoes on wood floors. · Walk on the grass when the sidewalks are slippery. If you live in an area that gets snow and ice in the winter, sprinkle salt on slippery steps and sidewalks. Preventing falls in the bath · Install grab bars and nonskid mats inside and outside your shower or tub and near the toilet and sinks. · Use shower chairs and bath benches. · Use a hand-held shower head that will allow you to sit while showering. · Get into a tub or shower by putting the weaker leg in first. Get out of a tub or shower with your strong side first. 
· Repair loose toilet seats and consider installing a raised toilet seat to make getting on and off the toilet easier. · Keep your bathroom door unlocked while you are in the shower. Where can you learn more? Go to http://jamel-erika.info/. Enter [phone] [id number] [phone] in the search box to learn more about \"Preventing Falls: Care Instructions. \" Current as of: November 7, 2018 Content Version: 12.2 © 7823-4754 [employer]. Care instructions adapted under license by "[employer] (which disclaims liability or warranty for this information). If you have questions about a medical condition or this instruction, always ask your healthcare professional. [place] [address] any warranty or liability for your use of this information. Medicare Wellness Visit, Male The best way to live healthy is to have a lifestyle where you eat a well-balanced diet, exercise regularly, limit alcohol use, and quit all forms of tobacco/nicotine, if applicable. Regular preventive services are another way to keep healthy. Preventive services (vaccines, screening tests, monitoring & exams) can help personalize your care plan, which helps you manage your own care. Screening tests can find health problems at the earliest stages, when they are easiest to treat. [facility] follows the current, evidence-based guidelines published by the [facility] (USPSTF) when recommending preventive services for our patients. Because we follow these guidelines, sometimes recommendations change over time as research supports it. (For example, a prostate screening blood test is no longer routinely recommended for men with no symptoms.) Of course, you and your doctor may decide to screen more often for some diseases, based on your risk and co-morbidities (chronic disease you are already diagnosed with). Preventive services for you include: - Medicare offers their members a free annual wellness visit, which is time for you and your primary care provider to discuss and plan for your preventive service needs. Take advantage of this benefit every year! 
-All adults over age 72 should receive the recommended pneumonia vaccines. Current USPSTF guidelines recommend a series of two vaccines for the best pneumonia protection.  
-All adults should have a flu vaccine yearly and an ECG. All adults age 61 and older should receive a shingles vaccine once in their lifetime.   
-All adults age 38-68 who are overweight should have a diabetes screening test once every three years.  
-Other screening tests & preventive services for persons with diabetes include: an eye exam to screen for diabetic retinopathy, a kidney function test, a foot exam, and stricter control over your cholesterol.  
-Cardiovascular screening for adults with routine risk involves an electrocardiogram (ECG) at intervals determined by the provider.  
-Colorectal cancer screening should be done for adults age 54-65 with no increased risk factors for colorectal cancer. There are a number of acceptable methods of screening for this type of cancer. Each test has its own benefits and drawbacks. Discuss with your provider what is most appropriate for you during your annual wellness visit. The different tests include: colonoscopy (considered the best screening method), a fecal occult blood test, a fecal DNA test, and sigmoidoscopy. 
-All adults born between [facility] should be screened once for Hepatitis C. 
-An Abdominal Aortic Aneurysm (AAA) Screening is recommended for men age 73-68 who has ever smoked in their lifetime. Here is a list of your current Health Maintenance items (your personalized list of preventive services) with a due date: 
Health Maintenance Due Topic Date Due  
 Hepatitis C Test  1955

## 2019-10-23 LAB
HCV AB SER IA-ACNC: 0.1 INDEX
HCV AB SERPL QL IA: NEGATIVE
HCV COMMENT,HCGAC: NORMAL

## 2019-11-04 ENCOUNTER — OFFICE VISIT (OUTPATIENT)
Dept: CARDIOLOGY CLINIC | Age: 64
End: 2019-11-04

## 2019-11-04 VITALS
WEIGHT: 290 LBS | BODY MASS INDEX: 36.06 KG/M2 | HEART RATE: 52 BPM | SYSTOLIC BLOOD PRESSURE: 137 MMHG | DIASTOLIC BLOOD PRESSURE: 77 MMHG | HEIGHT: 75 IN

## 2019-11-04 DIAGNOSIS — I08.0 MITRAL AND AORTIC REGURGITATION: ICD-10-CM

## 2019-11-04 DIAGNOSIS — E66.01 SEVERE OBESITY (BMI 35.0-39.9) WITH COMORBIDITY (HCC): ICD-10-CM

## 2019-11-04 DIAGNOSIS — I48.0 PAROXYSMAL ATRIAL FIBRILLATION (HCC): Primary | ICD-10-CM

## 2019-11-04 DIAGNOSIS — R07.9 CHEST PAIN, UNSPECIFIED TYPE: ICD-10-CM

## 2019-11-04 DIAGNOSIS — I10 ESSENTIAL HYPERTENSION: ICD-10-CM

## 2019-11-04 NOTE — PROGRESS NOTES
HISTORY OF PRESENT ILLNESS  Mony Paniagua is a 75 y.o. male. 9/19 new patient just moved from [place]. Seen for preop clearance. Has chronic back problems and peripheral neuropathy. History of paroxysmal A. Fib. Last episode 2013 approximately per patient. Has good functional capacity as he can go up 2-3 flights of steps and can walk half to 1 mile on a level ground without stopping. Chest Pain (Angina)    The history is provided by the patient. This is a new problem. The current episode started more than 1 week ago (yrs). The problem has been gradually improving. The problem occurs every several days (1/wk). The pain is associated with normal activity and rest. The pain is present in the lateral region and left side. The quality of the pain is described as dull. The pain does not radiate. Associated symptoms include lower extremity edema, palpitations and shortness of breath. Pertinent negatives include no claudication, no cough, no diaphoresis, no dizziness, no fever, no headaches, no malaise/fatigue, no nausea, no orthopnea, no PND and no vomiting. Leg Swelling   The history is provided by the patient. This is a new problem. The current episode started more than 1 week ago (3/19). The problem occurs constantly. The problem has not changed since onset. Associated symptoms include chest pain and shortness of breath. Pertinent negatives include no headaches. Nothing aggravates the symptoms. Palpitations    The history is provided by the patient. This is a new problem. The current episode started more than 1 week ago (5/19). The problem occurs rarely (2-3/month). On average, each episode lasts 5 minutes. The problem is associated with nothing. Associated symptoms include chest pain, lower extremity edema and shortness of breath.  Pertinent negatives include no diaphoresis, no fever, no malaise/fatigue, no claudication, no orthopnea, no PND, no nausea, no vomiting, no headaches, no dizziness and no cough. His past medical history is significant for hypertension. Hypertension   The history is provided by the medical records. This is a chronic problem. Associated symptoms include chest pain and shortness of breath. Pertinent negatives include no headaches. Review of Systems   Constitutional: Negative for chills, diaphoresis, fever, malaise/fatigue and weight loss. HENT: Negative for nosebleeds. Eyes: Negative for discharge. Respiratory: Positive for shortness of breath. Negative for cough and wheezing. Cardiovascular: Positive for chest pain, palpitations and leg swelling. Negative for orthopnea, claudication and PND. Gastrointestinal: Negative for diarrhea, nausea and vomiting. Genitourinary: Negative for dysuria and hematuria. Musculoskeletal: Negative for joint pain. Skin: Negative for rash. Neurological: Negative for dizziness, seizures, loss of consciousness and headaches. Endo/Heme/Allergies: Negative for polydipsia. Does not bruise/bleed easily. Psychiatric/Behavioral: Negative for depression and substance abuse. The patient does not have insomnia.       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    Cancer ([facility] [address]     Depression     Endocrine disease     Hypothyroidism    Fractures 1978, 2000    ankle left, left wrist    Headache     Hearing loss 2013    Hypertension     Ill-defined condition     Pain stimulator in back    Ill-defined condition     irregular heartbeat    Nausea & vomiting     Psychiatric disorder     depression    Sleep apnea     on cpap    Thyroid disease        Family History   Problem Relation Age of Onset    Arthritis-osteo Mother     Bleeding Prob Mother     Cancer Mother     Headache Mother     Hypertension Mother     Migraines Mother     Stroke Mother 68    Arthritis-osteo Father     Stroke Father 80    Arthritis-osteo Sister     Cancer Sister     Migraines Sister     Arthritis-osteo Brother     Cancer Brother     Headache Brother     Migraines Brother     Stroke Paternal Uncle     Heart Surgery Paternal Uncle 72    Diabetes Other        Social History     Tobacco Use    Smoking status: Former Smoker     Last attempt to quit:      Years since quittin.8    Smokeless tobacco: Never Used   Substance Use Topics    Alcohol use: No     Frequency: Never    Drug use: No        Current Outpatient Medications   Medication Sig    busPIRone (BUSPAR) 10 mg tablet TAKE 1 TABLET BY MOUTH THREE TIMES DAILY    buPROPion SR (WELLBUTRIN SR) 150 mg SR tablet TAKE 1 TABLET BY MOUTH ONCE DAILY    apixaban (ELIQUIS) 5 mg tablet Take 1 Tab by mouth two (2) times a day.  lisinopril (PRINIVIL, ZESTRIL) 20 mg tablet Take 1 Tab by mouth daily.  metoprolol tartrate (LOPRESSOR) 25 mg tablet Take 1 Tab by mouth two (2) times a day.  linaCLOtide (LINZESS) 72 mcg cap capsule Take 1 Cap by mouth daily.  acetaminophen/diphenhydramine (TYLENOL PM PO) Take  by mouth.  testosterone cypionate (DEPOTESTOTERONE CYPIONATE) 200 mg/mL injection 0.5 mL by IntraMUSCular route every seven (7) days. Max Daily Amount: 100 mg.  furosemide (LASIX) 20 mg tablet Take 10 mg by mouth daily.  hydrOXYzine HCl (ATARAX) 50 mg tablet Take 50 mg by mouth three (3) times daily as needed for Itching.     Needle, Disp, 21 G (HYPODERMIC NEEDLES) 21 gauge x 1\" ndle Inject 0.5ml  intramuscular route every 7 days    Syringe with Needle, Disp, (MONOJECT SAFETY SYRINGES) syrg To be used with medication    esomeprazole (NEXIUM) 40 mg capsule Take 1 Cap by mouth daily.  cholecalciferol (VITAMIN D3) 1,000 unit tablet Take  by mouth daily.  tiZANidine (ZANAFLEX) 4 mg capsule Take 4 mg by mouth as needed.  HYDROcodone-acetaminophen (NORCO)  mg tablet Take 1 Tab by mouth every twelve (12) hours as needed for Pain. Max Daily Amount: 2 Tabs.  amLODIPine (NORVASC) 5 mg tablet Take 1 Tab by mouth daily.  levothyroxine (SYNTHROID) 100 mcg tablet Take 1 Tab by mouth Daily (before breakfast).  acetaminophen (TYLENOL) 325 mg tablet Take 2 Tabs by mouth every six (6) hours as needed for Pain. No current facility-administered medications for this visit. Past Surgical History:   Procedure Laterality Date    HX BACK SURGERY  10/10/2019    Revision SCS    HX COLONOSCOPY      HX HEENT  2003    Reconstructive surgery on face    HX KNEE REPLACEMENT  2015/2016    bilat knees    HX ORTHOPAEDIC  2495-0546    back surgery X4       Visit Vitals  /77   Pulse (!) 52   Ht 6' 3\" (1.905 m)   Wt 131.5 kg (290 lb)   BMI 36.25 kg/m²       Diagnostic Studies:  I have reviewed the relevant tests done on the patient and show as follows  EKG tracings reviewed by me today. EKG Results     None        XR Results (most recent):  Results from Hospital Encounter encounter on 06/27/19   [facility] X[facility] TECHNOLOGIST SERVICE    Narrative Fluoroscopy was provided for a bundled exam for documentation purposes. Impression IMPRESSION:    Please see above. FLUORO TIME: 00.08    AJB                        No flowsheet data found. Mr. Kian Chaudhary has a reminder for a \"due or due soon\" health maintenance. I have asked that he contact his primary care provider for follow-up on this health maintenance. Physical Exam   Constitutional: He is oriented to person, place, and time. He appears well-developed and well-nourished. No distress. HENT:   Head: Normocephalic and atraumatic.    Mouth/Throat: Normal dentition. Eyes: Right eye exhibits no discharge. Left eye exhibits no discharge. No scleral icterus. Neck: Neck supple. No JVD present. Carotid bruit is not present. No thyromegaly present. Cardiovascular: Normal rate, regular rhythm, S1 normal, S2 normal, normal heart sounds and intact distal pulses. Exam reveals no gallop and no friction rub. No murmur heard. Pulmonary/Chest: Effort normal and breath sounds normal. He has no wheezes. He has no rales. Abdominal: Soft. He exhibits no mass. There is no tenderness. Musculoskeletal: He exhibits edema (trace to 1+). Lymphadenopathy:        Right cervical: No superficial cervical adenopathy present. Left cervical: No superficial cervical adenopathy present. Neurological: He is alert and oriented to person, place, and time. Skin: Skin is warm and dry. No rash noted. Psychiatric: He has a normal mood and affect. His behavior is normal.       ASSESSMENT and PLAN    History of paroxysmal A. Fib. Last episode 2013 approximately per patient. Mitral and aortic regurgitation: Mild 10/19    Results for Malou RICHARDSON. Mady Gunter (MRN [id number]) as of 9/13/2019 11:15   Ref. Range 2/1/2019 08:49   Triglyceride Latest Ref Range: <150 MG/DL 63   Cholesterol, total Latest Ref Range: <200 MG/   HDL Cholesterol Latest Ref Range: 40 - 60 MG/DL 46   CHOL/HDL Ratio Latest Ref Range: 0 - 5.0   3.0   LDL, calculated Latest Ref Range: 0 - 100 MG/DL 77.4   VLDL, calculated Latest Units: MG/DL 12.6     Chest pains are still persistent though not as frequent or as severe. Will get a stress test to evaluate any ischemia. Event monitor has shown paroxysmal atrial flutter/fibrillation. Eliquis has been started and metoprolol increased to control her rate better. Depending on ischemia further cardiac work-up will be required whether we go with heart catheterization or consider cardioversion.   Requested patient to postpone his elective colonoscopy for now.      Diagnoses and all orders for this visit:    1. Paroxysmal atrial fibrillation (HCC)    2. Essential hypertension    3. Severe obesity (BMI 35.0-39. 9) with comorbidity ([address]    4. Chest pain, unspecified type  -     NUCLEAR CARDIAC STRESS TEST; Future    5. Mitral and aortic regurgitation        Pertinent laboratory and test data reviewed and discussed with patient.   See patient instructions also for other medical advice given    Medications Discontinued During This Encounter   Medication Reason    tamsulosin (FLOMAX) 0.4 mg capsule        Follow-up and Dispositions    · Return in about 2 weeks (around 11/18/2019), or if symptoms worsen or fail to improve, for same day post test.

## 2019-11-04 NOTE — PROGRESS NOTES
1. Have you been to the ER, urgent care clinic since your last visit? Hospitalized since your last visit? Yes When: 10/2019  Where: [facility] Reason for visit: Pain Stimulator Device Replacement    2. Have you seen or consulted any other health care providers outside of the [address] [place] [address] since your last visit? Include any pap smears or colon screening. No     3. Since your last visit, have you had any of the following symptoms?      palpitations, dizziness and swelling in legs/arms. Explain: Bilateral Leg Swelling    4. Have you had any blood work, X-rays or cardiac testing? Yes Where: [facility] Reason for visit: Surgery     Requested: NO     In [facility]: YES    5. Where do you normally have your labs drawn? [facility]    6. Do you need any refills today?    No

## 2019-11-04 NOTE — PATIENT INSTRUCTIONS
Medications Discontinued During This Encounter   Medication Reason    tamsulosin (FLOMAX) 0.4 mg capsule           Atrial Fibrillation: Care Instructions  Your Care Instructions    Atrial fibrillation is an irregular and often fast heartbeat. Treating this condition is important for several reasons. It can cause blood clots, which can travel from your heart to your brain and cause a stroke. If you have a fast heartbeat, you may feel lightheaded, dizzy, and weak. An irregular heartbeat can also increase your risk for heart failure. Atrial fibrillation is often the result of another heart condition, such as high blood pressure or coronary artery disease. Making changes to improve your heart condition will help you stay healthy and active. Follow-up care is a key part of your treatment and safety. Be sure to make and go to all appointments, and call your doctor if you are having problems. It's also a good idea to know your test results and keep a list of the medicines you take. How can you care for yourself at home? Medicines    · Take your medicines exactly as prescribed. Call your doctor if you think you are having a problem with your medicine. You will get more details on the specific medicines your doctor prescribes.     · If your doctor has given you a blood thinner to prevent a stroke, be sure you get instructions about how to take your medicine safely. Blood thinners can cause serious bleeding problems.     · Do not take any vitamins, over-the-counter drugs, or herbal products without talking to your doctor first.    Lifestyle changes    · Do not smoke. Smoking can increase your chance of a stroke and heart attack. If you need help quitting, talk to your doctor about stop-smoking programs and medicines. These can increase your chances of quitting for good.     · Eat a heart-healthy diet.     · Stay at a healthy weight.  Lose weight if you need to.     · Limit alcohol to 2 drinks a day for men and 1 drink a day for women. Too much alcohol can cause health problems.     · Avoid colds and flu. Get a pneumococcal vaccine shot. If you have had one before, ask your doctor whether you need another dose. Get a flu shot every year. If you must be around people with colds or flu, wash your hands often. Activity    · If your doctor recommends it, get more exercise. Walking is a good choice. Bit by bit, increase the amount you walk every day. Try for at least 30 minutes on most days of the week. You also may want to swim, bike, or do other activities. Your doctor may suggest that you join a cardiac rehabilitation program so that you can have help increasing your physical activity safely.     · Start light exercise if your doctor says it is okay. Even a small amount will help you get stronger, have more energy, and manage stress. Walking is an easy way to get exercise. Start out by walking a little more than you did in the hospital. Gradually increase the amount you walk.     · When you exercise, watch for signs that your heart is working too hard. You are pushing too hard if you cannot talk while you are exercising. If you become short of breath or dizzy or have chest pain, sit down and rest immediately.     · Check your pulse regularly. Place two fingers on the artery at the palm side of your wrist, in line with your thumb. If your heartbeat seems uneven or fast, talk to your doctor. When should you call for help? Call 911 anytime you think you may need emergency care. For example, call if:    · You have symptoms of a heart attack. These may include:  ? Chest pain or pressure, or a strange feeling in the chest.  ? Sweating. ? Shortness of breath. ? Nausea or vomiting. ? Pain, pressure, or a strange feeling in the back, neck, jaw, or upper belly or in one or both shoulders or arms. ? Lightheadedness or sudden weakness. ? A fast or irregular heartbeat.   After you call 911, the  may tell you to chew 1 adult-strength or 2 to 4 low-dose aspirin. Wait for an ambulance. Do not try to drive yourself.     · You have symptoms of a stroke. These may include:  ? Sudden numbness, tingling, weakness, or loss of movement in your face, arm, or leg, especially on only one side of your body. ? Sudden vision changes. ? Sudden trouble speaking. ? Sudden confusion or trouble understanding simple statements. ? Sudden problems with walking or balance. ? A sudden, severe headache that is different from past headaches.     · You passed out (lost consciousness).    Call your doctor now or seek immediate medical care if:    · You have new or increased shortness of breath.     · You feel dizzy or lightheaded, or you feel like you may faint.     · Your heart rate becomes irregular.     · You can feel your heart flutter in your chest or skip heartbeats. Tell your doctor if these symptoms are new or worse.    Watch closely for changes in your health, and be sure to contact your doctor if you have any problems. Where can you learn more? Go to http://jamel-erika.info/. Enter U020 in the search box to learn more about \"Atrial Fibrillation: Care Instructions. \"  Current as of: April 9, 2019  Content Version: 12.2  © 7371-1138 [employer]. Care instructions adapted under license by [employer] (which disclaims liability or warranty for this information). If you have questions about a medical condition or this instruction, always ask your healthcare professional. [place] [address] any warranty or liability for your use of this information.

## 2019-11-18 ENCOUNTER — OFFICE VISIT (OUTPATIENT)
Dept: ORTHOPEDIC SURGERY | Age: 64
End: 2019-11-18

## 2019-11-18 VITALS
WEIGHT: 290 LBS | HEART RATE: 54 BPM | SYSTOLIC BLOOD PRESSURE: 159 MMHG | RESPIRATION RATE: 12 BRPM | OXYGEN SATURATION: 97 % | TEMPERATURE: 95.4 F | BODY MASS INDEX: 36.06 KG/M2 | HEIGHT: 75 IN | DIASTOLIC BLOOD PRESSURE: 78 MMHG

## 2019-11-18 DIAGNOSIS — S92.512D CLOSED DISPLACED FRACTURE OF PROXIMAL PHALANX OF LESSER TOE OF LEFT FOOT WITH ROUTINE HEALING, SUBSEQUENT ENCOUNTER: Primary | ICD-10-CM

## 2019-11-18 DIAGNOSIS — M79.2 NEUROPATHIC PAIN OF RIGHT FOOT: ICD-10-CM

## 2019-11-18 DIAGNOSIS — G57.82 NEURITIS OF LEFT SURAL NERVE: ICD-10-CM

## 2019-11-18 DIAGNOSIS — R60.0 LOWER LEG EDEMA: ICD-10-CM

## 2019-11-18 DIAGNOSIS — G57.81 NEURITIS OF RIGHT SURAL NERVE: ICD-10-CM

## 2019-11-18 NOTE — PATIENT INSTRUCTIONS
You have been provided with an order for durable medical equipment that you may  at an outside facility as our office does not carry the equipment you need. You may pick it up at any medical supply company you like. Listed below are a few different locations for your convenience: 
 
[address] [facility] [place] Phone: [phone] [place]:  
[address] [facility], [address] [place], [address] [place]: 
Angie [address] [place], [address] [place]: 
[address] [facility], [address] [facility] [address] [facility], [address] [place], [address] Phone: [phone] [address] [place] [address] and Orth[facility] [address], [address] [place], [address] Phone: [phone] Neuropathic Pain: Care Instructions Your Care Instructions Neuropathic pain is caused by pressure on or damage to your nerves. It's often simply called nerve pain. Some people feel this type of pain all the time. For others, it comes and goes. Diabetes, shingles, or an injury can cause nerve pain. Many people say the pain feels sharp, burning, or stabbing. But some people feel it as a dull ache. In some cases, it makes your skin very sensitive. So touch, pressure, and other sensations that did not hurt before may now cause pain. It's important to know that this kind of pain is real and can affect your quality of life. It's also important to know that treatment can help. Treatment includes pain medicines, exercise, and physical therapy. Medicines can help reduce the number of pain signals that travel over the nerves. This can make the painful areas less sensitive. It can also help you sleep better and improve your mood. But medicines are only one part of successful treatment. Most people do best with more than one kind of treatment. Your doctor may recommend that you try cognitive-behavioral therapy and stress management. Or, if needed, you may decide to try to quit smoking, lower your blood pressure, or better control blood sugar. These kinds of healthy changes can also make a difference. If you feel that your treatment is not working, talk to your doctor. And be sure to tell your doctor if you think you might be depressed or anxious. These are common problems that can also be treated. Follow-up care is a key part of your treatment and safety. Be sure to make and go to all appointments, and call your doctor if you are having problems. It's also a good idea to know your test results and keep a list of the medicines you take. How can you care for yourself at home? · Be safe with medicines. Read and follow all instructions on the label. ? If the doctor gave you a prescription medicine for pain, take it as prescribed. ? If you are not taking a prescription pain medicine, ask your doctor if you can take an over-the-counter medicine. · Save hard tasks for days when you have less pain. Follow a hard task with an easy task. And remember to take breaks. · Relax, and reduce stress. You may want to try deep breathing or meditation. These can help. · Keep moving. Gentle, daily exercise can help reduce pain. Your doctor or physical therapist can tell you what type of exercise is best for you. This may include walking, swimming, and stationary biking. It may also include stretches and range-of-motion exercises. · Try heat, cold packs, and massage. · Get enough sleep. Constant pain can make you more tired. If the pain makes it hard to sleep, talk with your doctor. · Think positively. Your thoughts can affect your pain. Do fun things to distract yourself from the pain. See a movie, read a book, listen to music, or spend time with a friend. · Keep a pain diary. Try to write down how strong your pain is and what it feels like.  Also try to notice and write down how your moods, thoughts, sleep, activities, and medicine affect your pain. These notes can help you and your doctor find the best ways to treat your pain. Reducing constipation caused by pain medicine Pain medicines often cause constipation. To reduce constipation: 
· Include fruits, vegetables, beans, and whole grains in your diet each day. These foods are high in fiber. · Drink plenty of fluids, enough so that your urine is light yellow or clear like water. If you have kidney, heart, or liver disease and have to limit fluids, talk with your doctor before you increase the amount of fluids you drink. · Get some exercise every day. Build up slowly to 30 to 60 minutes a day on 5 or more days of the week. · Take a fiber supplement, such as Citrucel or Metamucil, every day if needed. Read and follow all instructions on the label. · Schedule time each day for a bowel movement. Having a daily routine may help. Take your time and do not strain when having a bowel movement. · Ask your doctor about a laxative. The goal is to have one easy bowel movement every 1 to 2 days. Do not let constipation go untreated for more than 3 days. When should you call for help? Call your doctor now or seek immediate medical care if: 
  · You feel sad, anxious, or hopeless for more than a few days. This could mean you are depressed. Depression is common in people who have a lot of pain. But it can be treated.  
  · You have trouble with bowel movements, such as: 
? No bowel movement in 3 days. ? Blood in the anal area, in your stool, or on the toilet paper. ? Diarrhea for more than 24 hours.  
 Watch closely for changes in your health, and be sure to contact your doctor if: 
  · Your pain is getting worse.  
  · You can't sleep because of pain.  
  · You are very worried or anxious about your pain.  
  · You have trouble taking your pain medicine.  
  · You have any concerns about your pain medicine or its side effects.   · You have vomiting or cramps for more than 2 hours. Where can you learn more? Go to http://jamel-erika.info/. Enter Z429 in the search box to learn more about \"Neuropathic Pain: Care Instructions. \" Current as of: March 28, 2019 Content Version: 12.2 © 3066-4956 [employer]. Care instructions adapted under license by [employer] (which disclaims liability or warranty for this information). If you have questions about a medical condition or this instruction, always ask your healthcare professional. [address] any warranty or liability for your use of this information.

## 2019-11-18 NOTE — PROGRESS NOTES
AMBULATORY PROGRESS NOTE      Patient: Cheryle Hills. MRN: [id number]     SSN: [id number] Body mass index is 36.25 kg/m². YOB: 1955     AGE: 75 y.o. EX: male    PCP: Yoselin Arreaga MD     IMPRESSION/DIAGNOSIS AND TREATMENT PLAN     DIAGNOSES  1. Closed displaced fracture of proximal phalanx of lesser toe of left foot with routine healing, subsequent encounter    2. Neuritis of left sural nerve    3. Neuritis of right sural nerve    4. Neuropathic pain of right foot    5. Lower leg edema        Orders Placed This Encounter    AMB SUPPLY ORDER    REFERRAL TO NEUROLOGY    [facility] [address] Javier Sly. understands his diagnoses and the proposed plan. Lauren Brown MD, has ordered a custom brace or DME product for you. This will be customized and made for you by an outside facility. I am requesting that you contact the Orthotist company provided below in order to have the prescription made. Cheryle Hills. is in need of CUSTOM   Bilateral     1. Bilateral: TRILAMINAR ORTHOTIC,  LATERAL POSTED Bilateral    2. GOAL OF TREATMENT TO: to provide M/L stability, optimal arch support, and limit ML/AP motion. Cheryle Hills. needs tri planar control (Medial / Lateral stability, optimal arch support, and limited Medial/Lateral // Anterior/Posterior Motion) of the foot and ankle in order to improve anatomical alignment, protect/control motion, and to relieve pain. Plan:    1) Referral to Neurology. 2) Referral to Vascular specialist.   3) PVL venous studies of the BLE. 4) DME Order: Custom bilateral trilaminar orthotic insert with lateral posting (). 5) Continue activity modification as directed. 6) Discontinue using the compression stockings as directed. 7) Anticipate diagnostic US of the BLE or MRI (which the patient can now get) if condition does not improve.      RTO - 5 weeks     HPI AND EXAMINATION     Regis LUCAS Benjamin Osborne. IS A 75 y.o. male who presents to my outpatient office for follow up of a closed displaced fracture of the proximal phalanx of the lesser toe of the left foot. At the last visit, I provided an order for Jobst compression stockings, instructed the patient to continue activity modification as directed, and to anticipate EMG/NCS of the BLE if condition does not improve. Date of injury: 06/18/2019. Since we saw him last, Mr. Carmencita Newman states that he is still experiencing swelling in his bilateral legs, right more so than left. He notes that he had vascular studies performed in the past, but he notes that it was a long time ago. He reports that he had his spinal stimulator replaced by Dr. Elpidio Hitchcock, but he notes that it will not reach his legs. He reports that his cardiologist said his swelling was not from his heart and he will be doing a stress test later this week on Thursday. He states that the compression stockings have not helped with his swelling. He reports that when he wears shoes, his feet begin to feel tight and he starts to experience pain in his dorsal foot. He adds that he also experiences burning pain in his feet. He reports that he used to take Lyrica, which did help him, but he is no longer taking it. He denies h/o DM. He notes that with his new spinal stimulator, he is able to get MRIs. Visit Vitals  /78   Pulse (!) 54   Temp 95.4 °F (35.2 °C) (Oral)   Resp 12   Ht 6' 3\" (1.905 m)   Wt 290 lb (131.5 kg)   SpO2 97%   BMI 36.25 kg/m²     Appearance: Alert, well appearing and pleasant patient who is in no distress, oriented to person, place/time, and who follows commands. This patient is accompanied in the examination room by his wife. Dementia: no dementia  Psychiatric: Affect and mood are appropriate. Patient arrives to office via: with assistive device: cane  HEENT: Head normocephalic & atraumatic.  Both pupils are round, non icteric sclera   Eye: EOM are intact and sclera are clear Neck: ROM WNL and JVD neck is not present     Hearings Intact, does not require hearing aid device  Respiratory: Breathing is unlabored without accessory chest muscle use  Cardiovascular/Peripheral Vascular: Normal Pulses to each foot    ANKLE/FOOT bilateral    Gait: normal  Tenderness: Mild tenderness to the plantar lateral column of the foot, to the 5th metatarsal    Mild tenderness to the anterior tibial cortex to light touch    NT to the left 5th metatarsal  Cutaneous: Reddened discoloration to the toes. Joint Motion: Not tested. Joint / Tendon Stability: No Ankle or Subtalar instability or joint laxity. No peroneal sublux ability or dislocation  Alignment: Pes cavus alignment  Neuro Motor/Sensory: NL/NL. Negative Tinel's to the sural nerve. Can double stance heel rise with pain to the ankle   Vascular: NL foot/ankle pulses. Trace pre-tibial pitting edema  Lymphatics: No extremity lymphedema, No calf swelling, no tenderness to calf muscles. 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    Cancer ([facility] [address]     Depression     Endocrine disease     Hypothyroidism    Fractures 1978, 2000    ankle left, left wrist    Headache     Hearing loss 2013    Hypertension     Ill-defined condition     Pain stimulator in back    Ill-defined condition     irregular heartbeat    Nausea & vomiting     Psychiatric disorder     depression    Sleep apnea     on cpap    Thyroid disease      Current Outpatient Medications   Medication Sig    busPIRone (BUSPAR) 10 mg tablet TAKE 1 TABLET BY MOUTH THREE TIMES DAILY    buPROPion SR (WELLBUTRIN SR) 150 mg SR tablet TAKE 1 TABLET BY MOUTH ONCE DAILY    apixaban (ELIQUIS) 5 mg tablet Take 1 Tab by mouth two (2) times a day.     lisinopril (PRINIVIL, ZESTRIL) 20 mg tablet Take 1 Tab by mouth daily.    metoprolol tartrate (LOPRESSOR) 25 mg tablet Take 1 Tab by mouth two (2) times a day.  linaCLOtide (LINZESS) 72 mcg cap capsule Take 1 Cap by mouth daily.  acetaminophen/diphenhydramine (TYLENOL PM PO) Take  by mouth.  testosterone cypionate (DEPOTESTOTERONE CYPIONATE) 200 mg/mL injection 0.5 mL by IntraMUSCular route every seven (7) days. Max Daily Amount: 100 mg.  furosemide (LASIX) 20 mg tablet Take 10 mg by mouth daily.  hydrOXYzine HCl (ATARAX) 50 mg tablet Take 50 mg by mouth three (3) times daily as needed for Itching.  Needle, Disp, 21 G (HYPODERMIC NEEDLES) 21 gauge x 1\" ndle Inject 0.5ml  intramuscular route every 7 days    Syringe with Needle, Disp, (MONOJECT SAFETY SYRINGES) syrg To be used with medication    esomeprazole (NEXIUM) 40 mg capsule Take 1 Cap by mouth daily.  cholecalciferol (VITAMIN D3) 1,000 unit tablet Take  by mouth daily.  tiZANidine (ZANAFLEX) 4 mg capsule Take 4 mg by mouth as needed.  HYDROcodone-acetaminophen (NORCO)  mg tablet Take 1 Tab by mouth every twelve (12) hours as needed for Pain. Max Daily Amount: 2 Tabs.  amLODIPine (NORVASC) 5 mg tablet Take 1 Tab by mouth daily.  levothyroxine (SYNTHROID) 100 mcg tablet Take 1 Tab by mouth Daily (before breakfast).  acetaminophen (TYLENOL) 325 mg tablet Take 2 Tabs by mouth every six (6) hours as needed for Pain. No current facility-administered medications for this visit.       Allergies   Allergen Reactions    Latex Itching    Codeine Rash    Codeine Hives    Doxycycline Other (comments)     Dark urine    Doxycycline Itching     Urine turned black    Topamax [Topiramate] Itching     Rash, itching    Topamax [Topiramate] Hives    Verapamil Other (comments)     tachycardia    Verapamil Palpitations     Past Surgical History:   Procedure Laterality Date    HX BACK SURGERY  10/10/2019    Revision SCS    HX COLONOSCOPY      HX HEENT  2003    Reconstructive surgery on face    HX KNEE REPLACEMENT      bilat knees    HX ORTHOPAEDIC  [id number]    back surgery X4     Social History     Occupational History    Not on file   Tobacco Use    Smoking status: Former Smoker     Last attempt to quit: 1987     Years since quittin.9    Smokeless tobacco: Never Used   Substance and Sexual Activity    Alcohol use: No     Frequency: Never    Drug use: No    Sexual activity: Never     Partners: Female     Family History   Problem Relation Age of Onset    Arthritis-osteo Mother     Bleeding Prob Mother     Cancer Mother     Headache Mother     Hypertension Mother     Migraines Mother     Stroke Mother 68    Arthritis-osteo Father     Stroke Father 80    Arthritis-osteo Sister     Cancer Sister     Migraines Sister     Arthritis-osteo Brother     Cancer Brother     Headache Brother     Migraines Brother     Stroke Paternal Uncle     Heart Surgery Paternal Uncle 72    Diabetes Other         REVIEW OF SYSTEMS : 2019  ALL BELOW ARE Negative except : SEE HPI     Constitutional: Negative for fever, chills and weight loss. Neg Weight Loss  Cardiovascular: Negative for chest pain, claudication and leg swelling. SOB, ROSALES   Gastrointestinal/Urological: Negative for  pain, N/V/D/C, Blood in stool or urine,dysuria                         Hematuria, Incontinence, pelvic pain  Musculoskeletal: see HPI. Neurological: Negative for dizziness and weakness, headaches,Visual Changes             Confusion,  Or Seizures,   Psychiatric/Behavioral: Negative for depression, memory loss and substance abuse. Extremities:  Negative for hair changes, rash or skin lesion changes. Hematologic: Negative for Bleeding problems, bruising, pallor or swollen lymph nodes.   Peripheral Vascular: No calf pain, vascular vein tenderness to calf pain              No calf throbbing, posterior knee throbbing pain     DIAGNOSTIC IMAGING     No notes on file    Please see above section of this report. I have personally reviewed the results of the above study. The interpretation of this study is my professional opinion. Written by Louis Ramey, as dictated by Dr. Hugh Burroughs. I, Dr. Hugh Burroughs, confirm that all documentation is accurate.

## 2019-11-18 NOTE — PROGRESS NOTES
1. Have you been to the ER, urgent care clinic since your last visit? Hospitalized since your last visit? No    2. Have you seen or consulted any other health care providers outside of the [address] [place] [address] since your last visit? Include any pap smears or colon screening.  No

## 2019-11-19 ENCOUNTER — OFFICE VISIT (OUTPATIENT)
Dept: ORTHOPEDIC SURGERY | Age: 64
End: 2019-11-19

## 2019-11-19 VITALS
WEIGHT: 288 LBS | HEIGHT: 75 IN | BODY MASS INDEX: 35.81 KG/M2 | DIASTOLIC BLOOD PRESSURE: 75 MMHG | SYSTOLIC BLOOD PRESSURE: 140 MMHG | TEMPERATURE: 97.4 F | OXYGEN SATURATION: 97 % | HEART RATE: 51 BPM | RESPIRATION RATE: 16 BRPM

## 2019-11-19 DIAGNOSIS — Z96.89 S/P INSERTION OF SPINAL CORD STIMULATOR: Primary | ICD-10-CM

## 2019-11-19 DIAGNOSIS — G89.4 CHRONIC PAIN SYNDROME: ICD-10-CM

## 2019-11-19 NOTE — PROGRESS NOTES
Darius Messina Nicolanathan[facility]s [facility] [address].  [address], [address]  [place] [address]  Phone: [phone]  Fax: [phone]  PROGRESS NOTE  Patient: Una Logan. MRN: [id number]       SSN: [id number]  YOB: 1955        AGE: 75 y.o. SEX: male  Body mass index is 36 kg/m². PCP: Kavya Kaminski MD  11/19/19    Chief Complaint   Patient presents with    Back Pain     post op 10/10       HISTORY OF PRESENT ILLNESS, RADIOGRAPHS, and PLAN:     HISTORY OF PRESENT ILLNESS:  Mr. Temitope Maynard returns today. He is six weeks out from revision of his spinal cord stimulator generator. He is quite pleased with its effectiveness. He finds the new one more effective than the old one. PHYSICAL EXAMINATION:  His wound is healed and dry. He is neurologically intact. ASSESSMENT/PLAN: There are no other issues to address right now. We will see him back again as needed.     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    Cancer ([facility] [address]     Depression     Endocrine disease     Hypothyroidism    Fractures 1978, 2000    ankle left, left wrist    Headache     Hearing loss 2013    Hypertension     Ill-defined condition     Pain stimulator in back    Ill-defined condition     irregular heartbeat    Nausea & vomiting     Psychiatric disorder     depression    Sleep apnea     on cpap    Thyroid disease        Family History   Problem Relation Age of Onset    Arthritis-osteo Mother     Bleeding Prob Mother     Cancer Mother     Headache Mother     Hypertension Mother     Migraines Mother     Stroke Mother 68    Arthritis-osteo Father     Stroke Father 80    Arthritis-osteo Sister    Celi Sit Cancer Sister     Migraines Sister     Arthritis-osteo Brother     Cancer Brother     Headache Brother     Migraines Brother     Stroke Paternal Uncle     Heart Surgery Paternal Uncle 72    Diabetes Other        Current Outpatient Medications   Medication Sig Dispense Refill    busPIRone (BUSPAR) 10 mg tablet TAKE 1 TABLET BY MOUTH THREE TIMES DAILY 90 Tab 0    buPROPion SR (WELLBUTRIN SR) 150 mg SR tablet TAKE 1 TABLET BY MOUTH ONCE DAILY 90 Tab 0    apixaban (ELIQUIS) 5 mg tablet Take 1 Tab by mouth two (2) times a day. 60 Tab 3    lisinopril (PRINIVIL, ZESTRIL) 20 mg tablet Take 1 Tab by mouth daily. 30 Tab 6    metoprolol tartrate (LOPRESSOR) 25 mg tablet Take 1 Tab by mouth two (2) times a day. 60 Tab 6    linaCLOtide (LINZESS) 72 mcg cap capsule Take 1 Cap by mouth daily. 90 Cap 3    acetaminophen/diphenhydramine (TYLENOL PM PO) Take  by mouth.  testosterone cypionate (DEPOTESTOTERONE CYPIONATE) 200 mg/mL injection 0.5 mL by IntraMUSCular route every seven (7) days. Max Daily Amount: 100 mg. 10 mL 3    furosemide (LASIX) 20 mg tablet Take 10 mg by mouth daily.  hydrOXYzine HCl (ATARAX) 50 mg tablet Take 50 mg by mouth three (3) times daily as needed for Itching.  Needle, Disp, 21 G (HYPODERMIC NEEDLES) 21 gauge x 1\" ndle Inject 0.5ml  intramuscular route every 7 days 10 Pen Needle 5    Syringe with Needle, Disp, (MONOJECT SAFETY SYRINGES) syrg To be used with medication 30 Syringe 3    esomeprazole (NEXIUM) 40 mg capsule Take 1 Cap by mouth daily. 180 Cap 3    cholecalciferol (VITAMIN D3) 1,000 unit tablet Take  by mouth daily.  tiZANidine (ZANAFLEX) 4 mg capsule Take 4 mg by mouth as needed.  HYDROcodone-acetaminophen (NORCO)  mg tablet Take 1 Tab by mouth every twelve (12) hours as needed for Pain. Max Daily Amount: 2 Tabs. 60 Tab 0    amLODIPine (NORVASC) 5 mg tablet Take 1 Tab by mouth daily. 90 Tab 3    levothyroxine (SYNTHROID) 100 mcg tablet Take 1 Tab by mouth Daily (before breakfast).  90 Tab 3    acetaminophen (TYLENOL) 325 mg tablet Take 2 Tabs by mouth every six (6) hours as needed for Pain.  20 Tab 0       Allergies   Allergen Reactions    Latex Itching    Codeine Rash    Codeine Hives    Doxycycline Other (comments)     Dark urine    Doxycycline Itching     Urine turned black    Topamax [Topiramate] Itching     Rash, itching    Topamax [Topiramate] Hives    Verapamil Other (comments)     tachycardia    Verapamil Palpitations       Past Surgical History:   Procedure Laterality Date    HX BACK SURGERY  10/10/2019    Revision SCS    HX COLONOSCOPY      HX HEENT      Reconstructive surgery on face    HX KNEE REPLACEMENT      bilat knees    HX ORTHOPAEDIC  [id number]    back surgery X4       Past Medical History:   Diagnosis Date    A-fib [facility] [address]  to present    Calculus of kidney     Cancer (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 [address]     Depression     Endocrine disease     Hypothyroidism    Fractures ,     ankle left, left wrist    Headache     Hearing loss     Hypertension     Ill-defined condition     Pain stimulator in back    Ill-defined condition     irregular heartbeat    Nausea & vomiting     Psychiatric disorder     depression    Sleep apnea     on cpap    Thyroid disease        Social History     Socioeconomic History    Marital status:      Spouse name: Not on file    Number of children: Not on file    Years of education: Not on file    Highest education level: Not on file   Occupational History    Not on file   Social Needs    Financial resource strain: Not on file    Food insecurity:     Worry: Not on file     Inability: Not on file    Transportation needs:     Medical: Not on file     Non-medical: Not on file   Tobacco Use    Smoking status: Former Smoker     Last attempt to quit:      Years since quittin.9    Smokeless tobacco: Never Used   Substance and Sexual Activity    Alcohol use: No     Frequency: Never    Drug use: No    Sexual activity: Never     Partners: Female   Lifestyle    Physical activity:     Days per week: Not on file     Minutes per session: Not on file    Stress: Not on file   Relationships    Social connections:     Talks on phone: Not on file     Gets together: Not on file     Attends Pent[employer]al service: Not on file     Active member of club or organization: Not on file     Attends meetings of clubs or organizations: Not on file     Relationship status: Not on file    Intimate partner violence:     Fear of current or ex partner: Not on file     Emotionally abused: Not on file     Physically abused: Not on file     Forced sexual activity: Not on file   Other Topics Concern     Service Not Asked    Blood Transfusions Not Asked    Caffeine Concern Not Asked    Occupational Exposure Not Asked   Yennifer Duos Hazards Not Asked    Sleep Concern Not Asked    Stress Concern Not Asked    Weight Concern Not Asked    Special Diet Not Asked    Back Care Not Asked    Exercise Not Asked    Bike Helmet Not Asked    Seat Belt Not Asked    Self-Exams Not Asked   Social History Narrative    ** Merged History Encounter **              REVIEW OF SYSTEMS:   CONSTITUTIONAL SYMPTOMS:  Negative. EYES:  Negative. EARS, NOSE, THROAT AND MOUTH:  Negative. CARDIOVASCULAR:  Negative. RESPIRATORY:  Negative. GENITOURINARY: Per HPI. GASTROINTESTINAL:  Per HPI. INTEGUMENTARY (SKIN AND/OR BREAST):  Negative. MUSCULOSKELETAL: Per HPI.   ENDOCRINE/RHEUMATOLOGIC:  Negative. NEUROLOGICAL:  Per HPI. HEMATOLOGIC/LYMPHATIC:  Negative. ALLERGIC/IMMUNOLOGIC:  Negative. PSYCHIATRIC:  Negative. PHYSICAL EXAMINATION:   Visit Vitals  /75   Pulse (!) 51   Temp 97.4 °F (36.3 °C) (Oral)   Resp 16   Ht 6' 3\" (1.905 m)   Wt 288 lb (130.6 kg)   SpO2 97%   BMI 36.00 kg/m²    PAIN SCALE: 4/10    CONSTITUTIONAL: The patient is in no apparent distress and is alert and oriented x 3. HEENT: Normocephalic.  Hearing grossly intact. NECK: Supple and symmetric. no tenderness, or masses were felt. RESPIRATORY: No labored breathing. CARDIOVASCULAR: The carotid pulses were normal. Peripheral pulses were 2+. CHEST: Normal AP diameter and normal contour without any kyphoscoliosis. LYMPHATIC: No lymphadenopathy was appreciated in the neck, axillae or groin. SKIN:  Incision healing well, no drainage, no erythema, no hernia, no seroma, no swelling, no dehiscence, incision well approximated. Negative for scars, rashes, lesions, or ulcers on the right upper, right lower, left upper, left lower and trunk. NEUROLOGICAL: Alert and oriented x 3. Ambulation without assistive device. FWB. EXTREMITIES: See musculoskeletal.  MUSCULOSKELETAL:   Head and Neck:  Negative for misalignment, asymmetry, crepitation, defects, tenderness masses or effusions.  Left Upper Extremity: Inspection, percussion and palpation performed. Posts sign is negative.  Right Upper Extremity: Inspection, percussion and palpation performed. Posts sign is negative.  Spine, Ribs and Pelvis: Mild back pain. Inspection, percussion and palpation performed. Negative for misalignment, asymmetry, crepitation, defects, tenderness masses or effusions.  Left Lower Extremity: Inspection, percussion and palpation performed. Negative straight leg raise.  Right Lower Extremity: Inspection, percussion and palpation performed. Negative straight leg raise. SPINE EXAM:    Lumbar spine: No rash, ecchymosis, or gross obliquity. No focal atrophy is noted. ASSESSMENT    ICD-10-CM ICD-9-CM    1. S/P insertion of spinal cord stimulator Z98.890 V45.89    2. Chronic pain syndrome G89.4 338.4        Written by Marian Prabhakar, as dictated by Castillo Anderson MD.    I, Dr. Castillo Anderson MD, confirm that all documentation is accurate.

## 2019-11-25 ENCOUNTER — OFFICE VISIT (OUTPATIENT)
Dept: VASCULAR SURGERY | Age: 64
End: 2019-11-25

## 2019-11-25 VITALS
WEIGHT: 288 LBS | HEIGHT: 75 IN | BODY MASS INDEX: 35.81 KG/M2 | DIASTOLIC BLOOD PRESSURE: 80 MMHG | RESPIRATION RATE: 17 BRPM | SYSTOLIC BLOOD PRESSURE: 120 MMHG

## 2019-11-25 DIAGNOSIS — M48.062 SPINAL STENOSIS OF LUMBAR REGION WITH NEUROGENIC CLAUDICATION: ICD-10-CM

## 2019-11-25 DIAGNOSIS — I10 ESSENTIAL HYPERTENSION: ICD-10-CM

## 2019-11-25 DIAGNOSIS — E66.01 SEVERE OBESITY (BMI 35.0-39.9) WITH COMORBIDITY (HCC): ICD-10-CM

## 2019-11-25 DIAGNOSIS — I48.0 PAROXYSMAL ATRIAL FIBRILLATION (HCC): ICD-10-CM

## 2019-11-25 DIAGNOSIS — M79.89 LEG SWELLING: Primary | ICD-10-CM

## 2019-11-25 DIAGNOSIS — I87.2 VENOUS (PERIPHERAL) INSUFFICIENCY: ICD-10-CM

## 2019-11-25 RX ORDER — BUSPIRONE HYDROCHLORIDE 10 MG/1
TABLET ORAL
Qty: 90 TAB | Refills: 0 | Status: SHIPPED | OUTPATIENT
Start: 2019-11-25

## 2019-11-25 NOTE — PROGRESS NOTES
1. Have you been to an emergency room or urgent care clinic since your last visit? NO    Hospitalized since your last visit? If yes, where, when, and reason for visit? NO  2. Have you seen or consulted any other health care providers outside of the [facility] since your last visit including any procedures, health maintenance items. If yes, where, when and reason for visit?  NO

## 2019-11-25 NOTE — PROGRESS NOTES
Deshawn Rausch Chief Complaint   Patient presents with    New Patient    Swelling       HPI    16 Brittnee Rausch. is a 75 y.o. male who presents to the office today at the request of Dr. Bill Powell for bilateral lower extremity edema. Patient states that this is not a new problem. He has been wearing compression stockings for the past several months but states they do not seem to help. He does have chronic back pain and is status post multiple back surgeries. He does have a spinal cord stimulator in place and is on chronic pain medication. He does state that the spinal cord stimulator has helped with some of his leg pain but he still has significant pain in the feet. He is scheduled to get some orthotics for his shoes to help with his instep which is felt to be contributing to some of his foot pain. He has no personal history of diabetes. He quit smoking back in the 1980s. He does not describe any symptoms of classic claudication and has no rest pain. He denies any history of DVT and he is on chronic anticoagulation for atrial fibrillation. He did have a recent echocardiogram which showed an ejection fraction of 55 to 60% with grade 2 diastolic dysfunction. He does take Lasix 20 mg daily. His wife who is with him in the office today states that he does not ambulate much. Patient states he is unable to do this due to his chronic pain issues. He does state that his mother had terrible varicose veins and underwent vein strippings in the past.  He denies any history of nonhealing ulcers.     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 9) with comorbidity ([facility] [address] E66.01    Failed orthopedic implant ([facility] [address] T84.498A    Depression, major, recurrent, mild (HCC) F33.0    Battery end of life of spinal cord stimulator Z45.42    Paroxysmal atrial fibrillation (HCC) I48.0    Essential hypertension I10    Mitral and aortic regurgitation I08.0    Chest pain R07.9     Past Surgical History:   Procedure Laterality Date    HX BACK SURGERY  10/10/2019    Revision SCS    HX COLONOSCOPY      HX HEENT  2003    Reconstructive surgery on face    HX KNEE REPLACEMENT  2015/2016    bilat knees    HX ORTHOPAEDIC  2378-1500    back surgery X4     Current Outpatient Medications   Medication Sig Dispense Refill    busPIRone (BUSPAR) 10 mg tablet TAKE 1 TABLET BY MOUTH THREE TIMES DAILY 90 Tab 0    buPROPion SR (WELLBUTRIN SR) 150 mg SR tablet TAKE 1 TABLET BY MOUTH ONCE DAILY 90 Tab 0    apixaban (ELIQUIS) 5 mg tablet Take 1 Tab by mouth two (2) times a day. 60 Tab 3    lisinopril (PRINIVIL, ZESTRIL) 20 mg tablet Take 1 Tab by mouth daily. 30 Tab 6    metoprolol tartrate (LOPRESSOR) 25 mg tablet Take 1 Tab by mouth two (2) times a day. 60 Tab 6    linaCLOtide (LINZESS) 72 mcg cap capsule Take 1 Cap by mouth daily. 90 Cap 3    acetaminophen/diphenhydramine (TYLENOL PM PO) Take  by mouth.  testosterone cypionate (DEPOTESTOTERONE CYPIONATE) 200 mg/mL injection 0.5 mL by IntraMUSCular route every seven (7) days. Max Daily Amount: 100 mg. 10 mL 3    furosemide (LASIX) 20 mg tablet Take 10 mg by mouth daily.  hydrOXYzine HCl (ATARAX) 50 mg tablet Take 50 mg by mouth three (3) times daily as needed for Itching.       Needle, Disp, 21 G (HYPODERMIC NEEDLES) 21 gauge x 1\" ndle Inject 0.5ml  intramuscular route every 7 days 10 Pen Needle 5    Syringe with Needle, Disp, (MONOJECT SAFETY SYRINGES) syrg To be used with medication 30 Syringe 3    esomeprazole (NEXIUM) 40 mg capsule Take 1 Cap by mouth daily. 180 Cap 3    cholecalciferol (VITAMIN D3) 1,000 unit tablet Take  by mouth daily.  tiZANidine (ZANAFLEX) 4 mg capsule Take 4 mg by mouth as needed.  HYDROcodone-acetaminophen (NORCO)  mg tablet Take 1 Tab by mouth every twelve (12) hours as needed for Pain. Max Daily Amount: 2 Tabs. 60 Tab 0    amLODIPine (NORVASC) 5 mg tablet Take 1 Tab by mouth daily. 90 Tab 3    levothyroxine (SYNTHROID) 100 mcg tablet Take 1 Tab by mouth Daily (before breakfast). 90 Tab 3    acetaminophen (TYLENOL) 325 mg tablet Take 2 Tabs by mouth every six (6) hours as needed for Pain.  20 Tab 0     Allergies   Allergen Reactions    Latex Itching    Codeine Rash    Codeine Hives    Doxycycline Other (comments)     Dark urine    Doxycycline Itching     Urine turned black    Topamax [Topiramate] Itching     Rash, itching    Topamax [Topiramate] Hives    Verapamil Other (comments)     tachycardia    Verapamil Palpitations     Social History     Socioeconomic History    Marital status:      Spouse name: Not on file    Number of children: Not on file    Years of education: Not on file    Highest education level: Not on file   Occupational History    Not on file   Social Needs    Financial resource strain: Not on file    Food insecurity:     Worry: Not on file     Inability: Not on file    Transportation needs:     Medical: Not on file     Non-medical: Not on file   Tobacco Use    Smoking status: Former Smoker     Last attempt to quit:      Years since quittin.9    Smokeless tobacco: Never Used   Substance and Sexual Activity    Alcohol use: No     Frequency: Never    Drug use: No    Sexual activity: Never     Partners: Female   Lifestyle    Physical activity:     Days per week: Not on file     Minutes per session: Not on file    Stress: Not on file   Relationships    Social connections:     Talks on phone: Not on file     Gets together: Not on file     Attends Zoroastrian service: Not on file     Active member of club or organization: Not on file     Attends meetings of clubs or organizations: Not on file     Relationship status: Not on file    Intimate partner violence:     Fear of current or ex partner: Not on file     Emotionally abused: Not on file     Physically abused: Not on file     Forced sexual activity: Not on file   Other Topics Concern     Service Not Asked    Blood Transfusions Not Asked    Caffeine Concern Not Asked    Occupational Exposure Not Asked   Stevenson Dann Hazards Not Asked    Sleep Concern Not Asked    Stress Concern Not Asked    Weight Concern Not Asked    Special Diet Not Asked    Back Care Not Asked    Exercise Not Asked    Bike Helmet Not Asked   [address] Not Asked    Self-Exams Not Asked   Social History Narrative    ** Merged History Encounter **           Family History   Problem Relation Age of Onset    Arthritis-osteo Mother     Bleeding Prob Mother     Cancer Mother     Headache Mother     Hypertension Mother     Migraines Mother     Stroke Mother 68    Arthritis-osteo Father     Stroke Father 80    Arthritis-osteo Sister     Cancer Sister     Migraines Sister     Arthritis-osteo Brother     Cancer Brother     Headache Brother     Migraines Brother     Stroke Paternal Uncle     Heart Surgery Paternal Uncle 72    Diabetes Other        Review of Systems    Constitutional: negative   HEENT: negative   Respiratory: negative   Cardiovascular: negative   Gastrointestinal: negative   Genitourinary:negative   Hematologic/lymphatic: negative   Musculoskeletal: positive for chronic back pain, leg pain  Neurological: positive for neuropathy  Behavioral/Psych: negative   Endocrine: negative   Allergic/Immunologic: negative      Physical Exam:    Visit Vitals  /80 (BP 1 Location: Left arm, BP Patient Position: Sitting)   Resp 17   Ht 6' 3\" (1.905 m)   Wt 288 lb (130.6 kg)   BMI 36.00 kg/m²      General: male in no acute distress   HEENT: EOMI, no scleral icterus is noted. No carotid bruits are heard bilaterally   Cardiovascular: Regular rhythm normal S1-S2 no rubs murmurs or gallops. Easily palpable pedal pulses. Pulmonary: No increased work or breathing is noted. Clear to auscultation bilaterally. No wheeze, rales or rhonchi. Abdomen: soft, nondistended. Extremities: Warm and well perfused bilaterally. +BLE edema. Scattered small varicosities mostly in the lower legs and gaitor regions bilaterally. No ulcers. Neuro: Cranial nerves II through XII are grossly intact   Integument: No ulcerations are identified visibly      Impression and Plan:  Clive Wilkes is a 75 y.o. male with bilateral lower extremity edema. Discussed that his leg swelling is likely multifactorial in nature. I discussed the nature of dependent edema. He does also have some signs of chronic venous insufficiency with small varicosities in the lower legs and feet. I do not appreciate any signs or symptom insufficiency on exam.  I discussed the role of compression therapy and leg elevation with him at length. Educational material was given to him in the office today. We will obtain venous reflux studies and have him back afterwards for further surgical discussion. I did also discuss the importance of exercise and weight loss with him. He did also discussed that I do not feel his leg pain is related to his swelling or venous insufficiency and that most of this is likely secondary to all of his spine and nerve issues. Patient and his wife both expressed understanding to all of this and agreed to the plan. PLEASE NOTE:  This document has been produced using voice recognition software. Unrecognized errors in transcription may be present.

## 2019-11-27 RX ORDER — FUROSEMIDE 20 MG/1
10 TABLET ORAL DAILY
Qty: 90 TAB | Refills: 3 | Status: SHIPPED | OUTPATIENT
Start: 2019-11-27 | End: 2020-07-17 | Stop reason: SINTOL

## 2019-11-27 NOTE — TELEPHONE ENCOUNTER
Requested Prescriptions     Pending Prescriptions Disp Refills    furosemide (LASIX) 20 mg tablet       Sig: Take 0.5 Tabs by mouth daily.

## 2020-01-27 ENCOUNTER — OFFICE VISIT (OUTPATIENT)
Dept: CARDIOLOGY CLINIC | Age: 65
End: 2020-01-27

## 2020-01-27 VITALS
HEART RATE: 51 BPM | BODY MASS INDEX: 35.31 KG/M2 | DIASTOLIC BLOOD PRESSURE: 73 MMHG | SYSTOLIC BLOOD PRESSURE: 120 MMHG | TEMPERATURE: 96.9 F | WEIGHT: 284 LBS | OXYGEN SATURATION: 98 % | HEIGHT: 75 IN

## 2020-01-27 DIAGNOSIS — I77.810 AORTIC ECTASIA, THORACIC (HCC): ICD-10-CM

## 2020-01-27 DIAGNOSIS — I08.0 MITRAL AND AORTIC REGURGITATION: ICD-10-CM

## 2020-01-27 DIAGNOSIS — E66.01 SEVERE OBESITY (BMI 35.0-39.9) WITH COMORBIDITY (HCC): ICD-10-CM

## 2020-01-27 DIAGNOSIS — I10 ESSENTIAL HYPERTENSION: ICD-10-CM

## 2020-01-27 DIAGNOSIS — I48.92 ATRIAL FLUTTER, PAROXYSMAL (HCC): Primary | ICD-10-CM

## 2020-01-27 RX ORDER — LISINOPRIL 20 MG/1
20 TABLET ORAL DAILY
Qty: 30 TAB | Refills: 6 | Status: SHIPPED | OUTPATIENT
Start: 2020-01-27 | End: 2020-07-27

## 2020-01-27 RX ORDER — PROPAFENONE HYDROCHLORIDE 225 MG/1
225 TABLET, FILM COATED ORAL 3 TIMES DAILY
Qty: 90 TAB | Refills: 1 | Status: SHIPPED | OUTPATIENT
Start: 2020-01-27 | End: 2020-02-24 | Stop reason: SDUPTHER

## 2020-01-27 RX ORDER — ESCITALOPRAM OXALATE 20 MG/1
20 TABLET ORAL DAILY
COMMUNITY
End: 2022-01-06

## 2020-01-27 NOTE — PATIENT INSTRUCTIONS
Medications Discontinued During This Encounter Medication Reason  acetaminophen/diphenhydramine (TYLENOL PM PO)  hydrOXYzine HCl (ATARAX) 50 mg tablet  lisinopril (PRINIVIL, ZESTRIL) 20 mg tablet Reorder  metoprolol tartrate (LOPRESSOR) 25 mg tablet Therapy Completed  tiZANidine (ZANAFLEX) 4 mg capsule Side Effects Discontinue metoprolol. Start propafenone 3 times a day. Repeat EKG in 1 week and follow-up with me in about a month when another EKG will be done. Body Mass Index: Care Instructions Your Care Instructions Body mass index (BMI) can help you see if your weight is raising your risk for health problems. It uses a formula to compare how much you weigh with how tall you are. · A BMI lower than 18.5 is considered underweight. · A BMI between 18.5 and 24.9 is considered healthy. · A BMI between 25 and 29.9 is considered overweight. A BMI of 30 or higher is considered obese. If your BMI is in the normal range, it means that you have a lower risk for weight-related health problems. If your BMI is in the overweight or obese range, you may be at increased risk for weight-related health problems, such as high blood pressure, heart disease, stroke, arthritis or joint pain, and diabetes. If your BMI is in the underweight range, you may be at increased risk for health problems such as fatigue, lower protection (immunity) against illness, muscle loss, bone loss, hair loss, and hormone problems. BMI is just one measure of your risk for weight-related health problems. You may be at higher risk for health problems if you are not active, you eat an unhealthy diet, or you drink too much alcohol or use tobacco products. Follow-up care is a key part of your treatment and safety. Be sure to make and go to all appointments, and call your doctor if you are having problems. It's also a good idea to know your test results and keep a list of the medicines you take. How can you care for yourself at home? · Practice healthy eating habits. This includes eating plenty of fruits, vegetables, whole grains, lean protein, and low-fat dairy. · If your doctor recommends it, get more exercise. Walking is a good choice. Bit by bit, increase the amount you walk every day. Try for at least 30 minutes on most days of the week. · Do not smoke. Smoking can increase your risk for health problems. If you need help quitting, talk to your doctor about stop-smoking programs and medicines. These can increase your chances of quitting for good. · Limit alcohol to 2 drinks a day for men and 1 drink a day for women. Too much alcohol can cause health problems. If you have a BMI higher than 25 · Your doctor may do other tests to check your risk for weight-related health problems. This may include measuring the distance around your waist. A waist measurement of more than 40 inches in men or 35 inches in women can increase the risk of weight-related health problems. · Talk with your doctor about steps you can take to stay healthy or improve your health. You may need to make lifestyle changes to lose weight and stay healthy, such as changing your diet and getting regular exercise. If you have a BMI lower than 18.5 · Your doctor may do other tests to check your risk for health problems. · Talk with your doctor about steps you can take to stay healthy or improve your health. You may need to make lifestyle changes to gain or maintain weight and stay healthy, such as getting more healthy foods in your diet and doing exercises to build muscle. Where can you learn more? Go to http://jamel-erika.info/. Enter S176 in the search box to learn more about \"Body Mass Index: Care Instructions. \" Current as of: March 28, 2019 Content Version: 12.2 © 7841-1574 [employer].  Care instructions adapted under license by [employer] (which disclaims liability or warranty for this information). If you have questions about a medical condition or this instruction, always ask your healthcare professional. [place] [address] any warranty or liability for your use of this information.

## 2020-01-27 NOTE — PROGRESS NOTES
HISTORY OF PRESENT ILLNESS  Jose C Stokes is a 75 y.o. male. 9/19 new patient just moved from [place]. Seen for preop clearance. Has chronic back problems and peripheral neuropathy. History of paroxysmal A. Fib. Last episode 2013 approximately per patient. Has good functional capacity as he can go up 2-3 flights of steps and can walk half to 1 mile on a level ground without stopping. Hypertension   The history is provided by the medical records. This is a chronic problem. Associated symptoms include chest pain and shortness of breath. Pertinent negatives include no headaches. Palpitations    The history is provided by the patient. This is a new problem. The current episode started more than 1 week ago (5/19). The problem occurs rarely (2-3/month). On average, each episode lasts 5 minutes. The problem is associated with nothing. Associated symptoms include chest pain, lower extremity edema and shortness of breath. Pertinent negatives include no diaphoresis, no fever, no malaise/fatigue, no claudication, no orthopnea, no PND, no nausea, no vomiting, no headaches, no dizziness and no cough. His past medical history is significant for hypertension. Chest Pain (Angina)    The history is provided by the patient. This is a new problem. The current episode started more than 1 week ago (yrs). The problem has been resolved. The problem occurs every several days (1/wk). The pain is associated with normal activity and rest. The pain is present in the lateral region and left side. The quality of the pain is described as dull. The pain does not radiate. Associated symptoms include lower extremity edema, palpitations and shortness of breath. Pertinent negatives include no claudication, no cough, no diaphoresis, no dizziness, no fever, no headaches, no malaise/fatigue, no nausea, no orthopnea, no PND and no vomiting. Leg Swelling   The history is provided by the patient. This is a new problem.  The current episode started more than 1 week ago (3/19). The problem occurs constantly. The problem has not changed since onset. Associated symptoms include chest pain and shortness of breath. Pertinent negatives include no headaches. Nothing aggravates the symptoms. Valvular Heart Disease   History provided by:  Associated symptoms include chest pain and shortness of breath. Pertinent negatives include no headaches. Review of Systems   Constitutional: Negative for chills, diaphoresis, fever, malaise/fatigue and weight loss. HENT: Negative for nosebleeds. Eyes: Negative for discharge. Respiratory: Positive for shortness of breath. Negative for cough and wheezing. Cardiovascular: Positive for chest pain, palpitations and leg swelling. Negative for orthopnea, claudication and PND. Gastrointestinal: Negative for diarrhea, nausea and vomiting. Genitourinary: Negative for dysuria and hematuria. Musculoskeletal: Negative for joint pain. Skin: Negative for rash. Neurological: Negative for dizziness, seizures, loss of consciousness and headaches. Endo/Heme/Allergies: Negative for polydipsia. Does not bruise/bleed easily. Psychiatric/Behavioral: Negative for depression and substance abuse. The patient does not have insomnia.       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    Cancer ([facility] [address]     Depression     Endocrine disease     Hypothyroidism    Fractures 1978, 2000    ankle left, left wrist    Headache     Hearing loss 2013    Hypertension     Ill-defined condition     Pain stimulator in back    Ill-defined condition     irregular heartbeat    Nausea & vomiting     Psychiatric disorder     depression    Sleep apnea     on cpap    Thyroid disease        Family History   Problem Relation Age of Onset    Arthritis-osteo Mother     Bleeding Prob Mother     Cancer Mother     Headache Mother     Hypertension Mother     Migraines Mother     Stroke Mother 68    Arthritis-osteo Father     Stroke Father 80    Arthritis-osteo Sister     Cancer Sister     Migraines Sister     Arthritis-osteo Brother     Cancer Brother     Headache Brother     Migraines Brother     Stroke Paternal Uncle     Heart Surgery Paternal Uncle 72    Diabetes Other        Social History     Tobacco Use    Smoking status: Former Smoker     Last attempt to quit:      Years since quittin.0    Smokeless tobacco: Never Used   Substance Use Topics    Alcohol use: No     Frequency: Never    Drug use: No        Current Outpatient Medications   Medication Sig    escitalopram oxalate (LEXAPRO) 20 mg tablet Take 20 mg by mouth daily. Take 1/2 (one-half) tablet by mouth once daily in the morning for 6 days then 1 once daily in the morning    furosemide (LASIX) 20 mg tablet Take 0.5 Tabs by mouth daily.  busPIRone (BUSPAR) 10 mg tablet TAKE 1 TABLET BY MOUTH THREE TIMES DAILY    buPROPion SR (WELLBUTRIN SR) 150 mg SR tablet TAKE 1 TABLET BY MOUTH ONCE DAILY    apixaban (ELIQUIS) 5 mg tablet Take 1 Tab by mouth two (2) times a day.  lisinopril (PRINIVIL, ZESTRIL) 20 mg tablet Take 1 Tab by mouth daily.  metoprolol tartrate (LOPRESSOR) 25 mg tablet Take 1 Tab by mouth two (2) times a day.  linaCLOtide (LINZESS) 72 mcg cap capsule Take 1 Cap by mouth daily.  testosterone cypionate (DEPOTESTOTERONE CYPIONATE) 200 mg/mL injection 0.5 mL by IntraMUSCular route every seven (7) days.  Max Daily Amount: 100 mg.    Needle, Disp, 21 G (HYPODERMIC NEEDLES) 21 gauge x 1\" ndle Inject 0.5ml  intramuscular route every 7 days    Syringe with Needle, Disp, (MONOJECT SAFETY SYRINGES) syrg To be used with medication    esomeprazole (NEXIUM) 40 mg capsule Take 1 Cap by mouth daily.  cholecalciferol (VITAMIN D3) 1,000 unit tablet Take  by mouth daily.  tiZANidine (ZANAFLEX) 4 mg capsule Take 4 mg by mouth as needed.  HYDROcodone-acetaminophen (NORCO)  mg tablet Take 1 Tab by mouth every twelve (12) hours as needed for Pain. Max Daily Amount: 2 Tabs.  amLODIPine (NORVASC) 5 mg tablet Take 1 Tab by mouth daily.  levothyroxine (SYNTHROID) 100 mcg tablet Take 1 Tab by mouth Daily (before breakfast).  acetaminophen (TYLENOL) 325 mg tablet Take 2 Tabs by mouth every six (6) hours as needed for Pain. No current facility-administered medications for this visit. Past Surgical History:   Procedure Laterality Date    HX BACK SURGERY  10/10/2019    Revision SCS    HX COLONOSCOPY      HX HEENT  2003    Reconstructive surgery on face    HX KNEE REPLACEMENT  2015/2016    bilat knees    HX ORTHOPAEDIC  [id number]    back surgery X4       Visit Vitals  /73 (BP 1 Location: Left arm, BP Patient Position: Sitting)   Pulse (!) 51   Temp 96.9 °F (36.1 °C) (Oral)   Ht 6' 3\" (1.905 m)   Wt 128.8 kg (284 lb)   SpO2 98%   BMI 35.50 kg/m²       Diagnostic Studies:  I have reviewed the relevant tests done on the patient and show as follows  EKG tracings reviewed by me today. EKG Results     None        XR Results (most recent):  Results from Hospital Encounter encounter on 06/27/19   [facility] XR TECHNOLOGIST SERVICE    Narrative Fluoroscopy was provided for a bundled exam for documentation purposes. Impression IMPRESSION:    Please see above. FLUORO TIME: 00.08    AJB         11/04/19   ECHO ADULT COMPLETE 11/04/2019 11/4/2019    Narrative · Left Ventricle: Normal cavity size and systolic function (ejection   fraction normal). Mildly to moderately increased wall thickness. Estimated   left ventricular ejection fraction is 56 - 60%. Moderate (grade 2) left   ventricular diastolic dysfunction. · Right Ventricle: Normal right ventricular size and function. · Right Atrium: Mildly dilated right atrium. · Mitral Valve: Mitral valve thickening. Mild mitral valve regurgitation   is present. · Aortic Valve: Mild aortic valve regurgitation is present. · Tricuspid Valve: Mild tricuspid valve regurgitation is present. · Pulmonary Artery: There is no evidence of pulmonary hypertension. · Aorta: Mildly dilated aortic root; diameter is 3.9 cm. Mildly dilated   ascending aorta; diameter is 4 cm. Signed by: Kiana Munoz MD     12/04/19   NUCLEAR CARDIAC STRESS TEST 12/04/2019 12/4/2019    Narrative · Baseline ECG: Normal EKG, myocardial infarction. The infarction is   located in the inferior regions. .  · Negative stress test.  · Gated SPECT: Left ventricular function post-stress was normal.   Calculated ejection fraction is 64%. There is no evidence of transient   ischemic dilation (TID). The TID ratio is 0.88. · Left ventricular perfusion is normal.  · Negative myocardial perfusion imaging. Myocardial perfusion imaging   supports a low risk stress test.        Signed by: Negra Darnell MD   10/19 cardiac event monitor  Paroxysmal atrial flutter. Mr. Gene Foster has a reminder for a \"due or due soon\" health maintenance. I have asked that he contact his primary care provider for follow-up on this health maintenance. Physical Exam   Constitutional: He is oriented to person, place, and time. He appears well-developed and well-nourished. No distress. HENT:   Head: Normocephalic and atraumatic. Mouth/Throat: Normal dentition. Eyes: Right eye exhibits no discharge. Left eye exhibits no discharge. No scleral icterus. Neck: Neck supple. No JVD present. Carotid bruit is not present. No thyromegaly present.    Cardiovascular: Normal rate, regular rhythm, S1 normal, S2 normal, normal heart sounds and intact distal pulses. Exam reveals no gallop and no friction rub. No murmur heard. Pulmonary/Chest: Effort normal and breath sounds normal. He has no wheezes. He has no rales. Abdominal: Soft. He exhibits no mass. There is no tenderness. Musculoskeletal:         General: Edema (trace to 1+) present. Lymphadenopathy:        Right cervical: No superficial cervical adenopathy present. Left cervical: No superficial cervical adenopathy present. Neurological: He is alert and oriented to person, place, and time. Skin: Skin is warm and dry. No rash noted. Psychiatric: He has a normal mood and affect. His behavior is normal.       ASSESSMENT and PLAN    History of paroxysmal A. Fib. Last episode 2013 approximately per patient. Mitral and aortic regurgitation: Mild 10/19    Results for Isabela RICHARDSON. Andreas Tavarez (MRN [id number]) as of 9/13/2019 11:15   Ref. Range 2/1/2019 08:49   Triglyceride Latest Ref Range: <150 MG/DL 63   Cholesterol, total Latest Ref Range: <200 MG/   HDL Cholesterol Latest Ref Range: 40 - 60 MG/DL 46   CHOL/HDL Ratio Latest Ref Range: 0 - 5.0   3.0   LDL, calculated Latest Ref Range: 0 - 100 MG/DL 77.4   VLDL, calculated Latest Units: MG/DL 12.6       Stress test was negative for any ischemia. Echo has revealed normal ejection fraction and mild MR. Mild aortic ectasia seen which will be followed by serial echoes. Discussed with patient importance of blood pressure control and weight loss which will help atrial arrhythmias, MR and lipids. Start propafenone and follow-up EKGs. Diagnoses and all orders for this visit:    1. Atrial flutter, paroxysmal (HCC)  -     AMB POC EKG ROUTINE W/ 12 LEADS, INTER & REP  -     propafenone (RYTHMOL) 225 mg tablet; Take 1 Tab by mouth three (3) times daily.  -     AMB POC EKG ROUTINE W/ 12 LEADS, INTER & REP; Future    2. Essential hypertension    3.  Severe obesity (BMI 35.0-39. 9) with comorbidity ([address]    4. Mitral and aortic regurgitation    5. Aortic ectasia, thoracic (HCC)    Other orders  -     lisinopril (PRINIVIL, ZESTRIL) 20 mg tablet; Take 1 Tab by mouth daily. Pertinent laboratory and test data reviewed and discussed with patient. See patient instructions also for other medical advice given    Medications Discontinued During This Encounter   Medication Reason    acetaminophen/diphenhydramine (TYLENOL PM PO)     hydrOXYzine HCl (ATARAX) 50 mg tablet     lisinopril (PRINIVIL, ZESTRIL) 20 mg tablet Reorder    metoprolol tartrate (LOPRESSOR) 25 mg tablet Therapy Completed    tiZANidine (ZANAFLEX) 4 mg capsule Side Effects       Follow-up and Dispositions    · Return in about 1 month (around 2/27/2020), or if symptoms worsen or fail to improve, for with ekg.

## 2020-01-27 NOTE — PROGRESS NOTES
1. Have you been to the ER, urgent care clinic since your last visit? Hospitalized since your last visit?     no    2. Have you seen or consulted any other health care providers outside of the [address] [place] [address] since your last visit? Include any pap smears or colon screening. Yes When: x 2 wks ago  Where: [address] [place], [place] [address]ab     3. Since your last visit, have you had any of the following symptoms?      palpitations and swelling in legs/arms. Chest pain but thinks it is muscular pain       Explain:swelling in right leg, palpitations off and on a couple of times a wk    4. Have you had any blood work, X-rays or cardiac testing? Yes When: 12/04/19 NUclear stess test      Requested: YES     In [facility]: YES    5. Where do you normally have your labs drawn? [facility]     6. Do you need any refills today? yes    Requested Prescriptions     Pending Prescriptions Disp Refills    lisinopril (PRINIVIL, ZESTRIL) 20 mg tablet 30 Tab 6     Sig: Take 1 Tab by mouth daily.

## 2020-02-12 RX ORDER — APIXABAN 5 MG/1
TABLET, FILM COATED ORAL
Qty: 60 TAB | Refills: 5 | Status: SHIPPED | OUTPATIENT
Start: 2020-02-12 | End: 2020-08-11

## 2020-02-17 ENCOUNTER — OFFICE VISIT (OUTPATIENT)
Dept: VASCULAR SURGERY | Age: 65
End: 2020-02-17

## 2020-02-17 VITALS
WEIGHT: 284 LBS | BODY MASS INDEX: 35.31 KG/M2 | DIASTOLIC BLOOD PRESSURE: 82 MMHG | OXYGEN SATURATION: 100 % | RESPIRATION RATE: 17 BRPM | SYSTOLIC BLOOD PRESSURE: 130 MMHG | HEART RATE: 74 BPM | HEIGHT: 75 IN

## 2020-02-17 DIAGNOSIS — I87.2 VENOUS (PERIPHERAL) INSUFFICIENCY: Primary | ICD-10-CM

## 2020-02-17 DIAGNOSIS — M48.062 SPINAL STENOSIS OF LUMBAR REGION WITH NEUROGENIC CLAUDICATION: ICD-10-CM

## 2020-02-17 DIAGNOSIS — M79.89 LEG SWELLING: ICD-10-CM

## 2020-02-17 DIAGNOSIS — E66.01 SEVERE OBESITY (BMI 35.0-39.9) WITH COMORBIDITY (HCC): ICD-10-CM

## 2020-02-17 NOTE — PROGRESS NOTES
Deshawn Rausch Chief Complaint   Patient presents with    Leg Pain    Swelling       HPI    Deshawn Rausch. is a 75 y.o. male who presents to the office today in follow up for bilateral lower extremity edema. He has been wearing knee high compression stockings for the past several months. Leg elevation can be difficult as he does have chronic back pain and is status post multiple back surgeries. He does have a spinal cord stimulator in place and is on chronic pain medication. He does state that the spinal cord stimulator has helped with some of his leg pain but he still has significant pain in the feet. He is scheduled to get some orthotics for his shoes to help with his instep which is felt to be contributing to some of his foot pain. He has no personal history of diabetes. He quit smoking back in the 1980s. He does not describe any symptoms of classic claudication and has no rest pain. He denies any history of DVT. He is on chronic anticoagulation for atrial fibrillation. Recent echocardiogram showed an ejection fraction of 55 to 60% with grade 2 diastolic dysfunction. He does take Lasix 20 mg daily. He does report that his mother had terrible varicose veins and underwent vein strippings in the past.  He denies any history of nonhealing ulcers. He did have venous reflux studies which revealed \"No DVT demonstrated bilateral lower extremity. Posterior tibial arteries are triphasic bilateral. Chronic nonocclusive thrombus in the right greater saphenous vein at the calf. Mild reflux in the mid to distal thigh right greater saphenous vein. Right small saphenous vein with 5.6 seconds of reflux. Chronic occlusive thrombus present in the left small saphenous vein. Left greater and lesser saphenous are competent\". He does state his right foot and ankle swelling is the most significant and bothersome to him. He states that this becomes very uncomfortable especially at the end of the day.  He also reports that if he exercises or walks his right foot swelling can be quite severe. 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 9) with comorbidity ([address] E66.01    Failed orthopedic implant ([facility] [address] T84.498A    Depression, major, recurrent, mild ([facility] [address] F33.0    Battery end of life of spinal cord stimulator Z45.42    Paroxysmal atrial fibrillation (HCC) I48.0    Essential hypertension I10    Mitral and aortic regurgitation I08.0    Chest pain R07.9    Aortic ectasia, thoracic (HCC) I77.810     Past Surgical History:   Procedure Laterality Date    HX BACK SURGERY  10/10/2019    Revision SCS    HX COLONOSCOPY      HX HEENT  2003    Reconstructive surgery on face    HX KNEE REPLACEMENT  2015/2016    bilat knees    HX ORTHOPAEDIC  2733-6554    back surgery X4     Current Outpatient Medications   Medication Sig Dispense Refill    ELIQUIS 5 mg tablet TAKE 1 TABLET BY MOUTH TWICE DAILY 60 Tab 5    escitalopram oxalate (LEXAPRO) 20 mg tablet Take 20 mg by mouth daily. Take 1/2 (one-half) tablet by mouth once daily in the morning for 6 days then 1 once daily in the morning      lisinopril (PRINIVIL, ZESTRIL) 20 mg tablet Take 1 Tab by mouth daily. 30 Tab 6    propafenone (RYTHMOL) 225 mg tablet Take 1 Tab by mouth three (3) times daily. 90 Tab 1    furosemide (LASIX) 20 mg tablet Take 0.5 Tabs by mouth daily.  80 Tab 3    busPIRone (BUSPAR) 10 mg tablet TAKE 1 TABLET BY MOUTH THREE TIMES DAILY 90 Tab 0    buPROPion SR (WELLBUTRIN SR) 150 mg SR tablet TAKE 1 TABLET BY MOUTH ONCE DAILY 90 Tab 0    linaCLOtide (LINZESS) 72 mcg cap capsule Take 1 Cap by mouth daily. 90 Cap 3    testosterone cypionate (DEPOTESTOTERONE CYPIONATE) 200 mg/mL injection 0.5 mL by IntraMUSCular route every seven (7) days. Max Daily Amount: 100 mg. 10 mL 3    Needle, Disp, 21 G (HYPODERMIC NEEDLES) 21 gauge x 1\" ndle Inject 0.5ml  intramuscular route every 7 days 10 Pen Needle 5    Syringe with Needle, Disp, (MONOJECT SAFETY SYRINGES) syrg To be used with medication 30 Syringe 3    esomeprazole (NEXIUM) 40 mg capsule Take 1 Cap by mouth daily. 180 Cap 3    cholecalciferol (VITAMIN D3) 1,000 unit tablet Take  by mouth daily.  HYDROcodone-acetaminophen (NORCO)  mg tablet Take 1 Tab by mouth every twelve (12) hours as needed for Pain. Max Daily Amount: 2 Tabs. 60 Tab 0    amLODIPine (NORVASC) 5 mg tablet Take 1 Tab by mouth daily. 90 Tab 3    levothyroxine (SYNTHROID) 100 mcg tablet Take 1 Tab by mouth Daily (before breakfast). 90 Tab 3    acetaminophen (TYLENOL) 325 mg tablet Take 2 Tabs by mouth every six (6) hours as needed for Pain.  20 Tab 0     Allergies   Allergen Reactions    Latex Itching    Codeine Rash    Codeine Hives    Doxycycline Other (comments)     Dark urine    Doxycycline Itching     Urine turned black    Topamax [Topiramate] Itching     Rash, itching    Topamax [Topiramate] Hives    Verapamil Other (comments)     tachycardia    Verapamil Palpitations     Social History     Socioeconomic History    Marital status:      Spouse name: Not on file    Number of children: Not on file    Years of education: Not on file    Highest education level: Not on file   Occupational History    Not on file   Social Needs    Financial resource strain: Not on file    Food insecurity:     Worry: Not on file Inability: Not on file    Transportation needs:     Medical: Not on file     Non-medical: Not on file   Tobacco Use    Smoking status: Former Smoker     Last attempt to quit:      Years since quittin.1    Smokeless tobacco: Never Used   Substance and Sexual Activity    Alcohol use: No     Frequency: Never    Drug use: No    Sexual activity: Never     Partners: Female   Lifestyle    Physical activity:     Days per week: Not on file     Minutes per session: Not on file    Stress: Not on file   Relationships    Social connections:     Talks on phone: Not on file     Gets together: Not on file     Attends [employer] service: Not on file     Active member of club or organization: Not on file     Attends meetings of clubs or organizations: Not on file     Relationship status: Not on file    Intimate partner violence:     Fear of current or ex partner: Not on file     Emotionally abused: Not on file     Physically abused: Not on file     Forced sexual activity: Not on file   Other Topics Concern     Service Not Asked    Blood Transfusions Not Asked    Caffeine Concern Not Asked    Occupational Exposure Not Asked   [employer] Hazards Not Asked    Sleep Concern Not Asked    Stress Concern Not Asked    Weight Concern Not Asked    Special Diet Not Asked    Back Care Not Asked    Exercise Not Asked    Bike Helmet Not Asked    [address] Not Asked    Self-Exams Not Asked   Social History Narrative    ** Merged History Encounter **           Family History   Problem Relation Age of Onset    Arthritis-osteo Mother     Bleeding Prob Mother     Cancer Mother     Headache Mother     Hypertension Mother     Migraines Mother     Stroke Mother 68    Arthritis-osteo Father     Stroke Father 80    Arthritis-osteo Sister     Cancer Sister     Migraines Sister     Arthritis-osteo Brother     Cancer Brother     Headache Brother     Migraines Brother     Stroke Paternal Uncle     Heart Surgery Paternal Uncle 72    Diabetes Other        Physical Exam:    Visit Vitals  /82 (BP 1 Location: Left arm, BP Patient Position: Sitting)   Pulse 74   Resp 17   Ht 6' 3\" (1.905 m)   Wt 284 lb (128.8 kg)   SpO2 100%   BMI 35.50 kg/m²      General: male in no acute distress   HEENT: EOMI, no scleral icterus is noted. Pulmonary: No increased work or breathing is noted. Abdomen: soft, nondistended. Extremities: Warm and well perfused bilaterally. +BLE edema. Neuro: Cranial nerves II through XII are grossly intact   Integument: No ulcerations are identified visibly      Impression and Plan:  Tigre Sanchez is a 75 y.o. male with bilateral lower extremity edema. Discussed that his leg swelling is likely multifactorial in nature; however imaging was reviewed with him and his wife in the office today as above and he does have significant reflux in the right SSV up to 5.6 seconds. I discussed possible closure procedure of the right SSV with him. Risks vs benefits of procedure were discussed with him. I did discuss that this procedure would not affect his left leg in any way however. However seeing as his right foot and ankle is his biggest complaint as far as his swelling is concerned I do believe that a closure procedure of the small saphenous vein would be beneficial for him. He is understanding that he has many other issues going on and we may not resolve his swelling 100% but he states the he would be happy with any improvement in his symptoms. I again discussed the role of compression therapy and leg elevation with him at length. Educational material was also given to him in the office today. Both patient and his wife expressed understanding to all of this and agreed to the plan. He would like to proceed with closure procedure at this time. PLEASE NOTE:  This document has been produced using voice recognition software. Unrecognized errors in transcription may be present.

## 2020-02-24 ENCOUNTER — OFFICE VISIT (OUTPATIENT)
Dept: CARDIOLOGY CLINIC | Age: 65
End: 2020-02-24

## 2020-02-24 VITALS
WEIGHT: 273.6 LBS | HEIGHT: 75 IN | OXYGEN SATURATION: 98 % | TEMPERATURE: 96.7 F | BODY MASS INDEX: 34.02 KG/M2 | HEART RATE: 65 BPM | SYSTOLIC BLOOD PRESSURE: 126 MMHG | DIASTOLIC BLOOD PRESSURE: 72 MMHG

## 2020-02-24 DIAGNOSIS — I48.92 ATRIAL FLUTTER, PAROXYSMAL (HCC): Primary | ICD-10-CM

## 2020-02-24 DIAGNOSIS — I08.0 MITRAL AND AORTIC REGURGITATION: ICD-10-CM

## 2020-02-24 DIAGNOSIS — E66.01 SEVERE OBESITY (BMI 35.0-39.9) WITH COMORBIDITY (HCC): ICD-10-CM

## 2020-02-24 DIAGNOSIS — I77.810 AORTIC ECTASIA, THORACIC (HCC): ICD-10-CM

## 2020-02-24 DIAGNOSIS — I10 ESSENTIAL HYPERTENSION: ICD-10-CM

## 2020-02-24 RX ORDER — PROPAFENONE HYDROCHLORIDE 300 MG/1
300 TABLET, COATED ORAL 3 TIMES DAILY
Qty: 90 TAB | Refills: 3 | Status: SHIPPED | OUTPATIENT
Start: 2020-02-24 | End: 2020-06-05 | Stop reason: SDUPTHER

## 2020-02-24 NOTE — PATIENT INSTRUCTIONS

## 2020-02-24 NOTE — PROGRESS NOTES
HISTORY OF PRESENT ILLNESS  Olga Horton is a 75 y.o. male. 9/19 new patient just moved from [place]. Seen for preop clearance. Has chronic back problems and peripheral neuropathy. History of paroxysmal A. Fib. Last episode 2013 approximately per patient. Has good functional capacity as he can go up 2-3 flights of steps and can walk half to 1 mile on a level ground without stopping.  2/2020 He c/o palpitations with associated SOB and chest pain with flushed sensation. Episodes last about 20 minutes and resolve. He is anticipating right venous surgery in March. Valvular Heart Disease   The history is provided by the patient and medical records (MR). Associated symptoms include chest pain and shortness of breath. Pertinent negatives include no headaches. Palpitations    The history is provided by the patient. This is a new problem. The current episode started more than 1 week ago (5/19). The problem occurs rarely (2-3/month). On average, each episode lasts 5 minutes. The problem is associated with nothing. Associated symptoms include chest pain, lower extremity edema and shortness of breath. Pertinent negatives include no diaphoresis, no fever, no malaise/fatigue, no claudication, no orthopnea, no PND, no nausea, no vomiting, no headaches, no dizziness and no cough. His past medical history is significant for hypertension. Hypertension   The history is provided by the medical records. This is a chronic problem. Associated symptoms include chest pain and shortness of breath. Pertinent negatives include no headaches. Chest Pain (Angina)    The history is provided by the patient. This is a new problem. The current episode started more than 1 week ago (yrs). The problem has been resolved. The problem occurs every several days (1/wk). The pain is associated with normal activity and rest. The pain is present in the lateral region and left side. The quality of the pain is described as dull.  The pain does not radiate. Associated symptoms include lower extremity edema, palpitations and shortness of breath. Pertinent negatives include no claudication, no cough, no diaphoresis, no dizziness, no fever, no headaches, no malaise/fatigue, no nausea, no orthopnea, no PND and no vomiting. Leg Swelling   The history is provided by the patient. This is a new problem. The current episode started more than 1 week ago (3/19). The problem occurs constantly. The problem has not changed since onset. Associated symptoms include chest pain and shortness of breath. Pertinent negatives include no headaches. Nothing aggravates the symptoms. Review of Systems   Constitutional: Negative for chills, diaphoresis, fever, malaise/fatigue and weight loss. HENT: Negative for nosebleeds. Eyes: Negative for discharge. Respiratory: Positive for shortness of breath. Negative for cough and wheezing. Cardiovascular: Positive for chest pain, palpitations and leg swelling. Negative for orthopnea, claudication and PND. Gastrointestinal: Negative for diarrhea, nausea and vomiting. Genitourinary: Negative for dysuria and hematuria. Musculoskeletal: Negative for joint pain. Skin: Negative for rash. Neurological: Negative for dizziness, seizures, loss of consciousness and headaches. Endo/Heme/Allergies: Negative for polydipsia. Does not bruise/bleed easily. Psychiatric/Behavioral: Negative for depression and substance abuse. The patient does not have insomnia.       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    Cancer ([facility] [address]     Depression     Endocrine disease     Hypothyroidism    Fractures ,     ankle left, left wrist    Headache     Hearing loss     Hypertension     Ill-defined condition     Pain stimulator in back    Ill-defined condition     irregular heartbeat    Nausea & vomiting     Psychiatric disorder     depression    Sleep apnea     on cpap    Thyroid disease        Family History   Problem Relation Age of Onset    Arthritis-osteo Mother     Bleeding Prob Mother     Cancer Mother     Headache Mother     Hypertension Mother     Migraines Mother     Stroke Mother 68    Arthritis-osteo Father     Stroke Father 80    Arthritis-osteo Sister     Cancer Sister     Migraines Sister     Arthritis-osteo Brother     Cancer Brother     Headache Brother     Migraines Brother     Stroke Paternal Uncle     Heart Surgery Paternal Uncle 72    Diabetes Other        Social History     Tobacco Use    Smoking status: Former Smoker     Last attempt to quit:      Years since quittin.1    Smokeless tobacco: Never Used   Substance Use Topics    Alcohol use: No     Frequency: Never    Drug use: No        Current Outpatient Medications   Medication Sig    ELIQUIS 5 mg tablet TAKE 1 TABLET BY MOUTH TWICE DAILY    escitalopram oxalate (LEXAPRO) 20 mg tablet Take 20 mg by mouth daily. Take 1/2 (one-half) tablet by mouth once daily in the morning for 6 days then 1 once daily in the morning    lisinopril (PRINIVIL, ZESTRIL) 20 mg tablet Take 1 Tab by mouth daily.  propafenone (RYTHMOL) 225 mg tablet Take 1 Tab by mouth three (3) times daily.  busPIRone (BUSPAR) 10 mg tablet TAKE 1 TABLET BY MOUTH THREE TIMES DAILY    buPROPion SR (WELLBUTRIN SR) 150 mg SR tablet TAKE 1 TABLET BY MOUTH ONCE DAILY    linaCLOtide (LINZESS) 72 mcg cap capsule Take 1 Cap by mouth daily.     testosterone cypionate (Angelique Redman CYPIONATE) 200 mg/mL injection 0.5 mL by IntraMUSCular route every seven (7) days. Max Daily Amount: 100 mg.    Needle, Disp, 21 G (HYPODERMIC NEEDLES) 21 gauge x 1\" ndle Inject 0.5ml  intramuscular route every 7 days    Syringe with Needle, Disp, (MONOJECT SAFETY SYRINGES) syrg To be used with medication    esomeprazole (NEXIUM) 40 mg capsule Take 1 Cap by mouth daily.  cholecalciferol (VITAMIN D3) 1,000 unit tablet Take  by mouth daily.  HYDROcodone-acetaminophen (NORCO)  mg tablet Take 1 Tab by mouth every twelve (12) hours as needed for Pain. Max Daily Amount: 2 Tabs.  amLODIPine (NORVASC) 5 mg tablet Take 1 Tab by mouth daily.  levothyroxine (SYNTHROID) 100 mcg tablet Take 1 Tab by mouth Daily (before breakfast).  acetaminophen (TYLENOL) 325 mg tablet Take 2 Tabs by mouth every six (6) hours as needed for Pain.  furosemide (LASIX) 20 mg tablet Take 0.5 Tabs by mouth daily. No current facility-administered medications for this visit. Past Surgical History:   Procedure Laterality Date    HX BACK SURGERY  10/10/2019    Revision SCS    HX COLONOSCOPY      HX HEENT  2003    Reconstructive surgery on face    HX KNEE REPLACEMENT  2015/2016    bilat knees    HX ORTHOPAEDIC  [id number]    back surgery X4       Visit Vitals  /72 (BP 1 Location: Left arm, BP Patient Position: Sitting)   Pulse 65   Temp 96.7 °F (35.9 °C) (Oral)   Ht 6' 3\" (1.905 m)   Wt 128.8 kg (284 lb)   SpO2 98%   BMI 35.50 kg/m²       Diagnostic Studies:  I have reviewed the relevant tests done on the patient and show as follows  EKG tracings reviewed by me today.     EKG Results     Procedure 720 Value Units Date/Time    AMB POC EKG ROUTINE W/ 12 LEADS, INTER & REP [[id number]] Resulted:  02/24/20 0818    Order Status:  Completed Updated:  02/24/20 0819        XR Results (most recent):  Results from [place] encounter on 06/27/19   [facility] XR TECHNOLOGIST SERVICE    Narrative Fluoroscopy was provided for a bundled exam for documentation purposes. Impression IMPRESSION:    Please see above. FLUORO TIME: 00.08    AJB         11/04/19   ECHO ADULT COMPLETE 11/04/2019 11/4/2019    Narrative · Left Ventricle: Normal cavity size and systolic function (ejection   fraction normal). Mildly to moderately increased wall thickness. Estimated   left ventricular ejection fraction is 56 - 60%. Moderate (grade 2) left   ventricular diastolic dysfunction. · Right Ventricle: Normal right ventricular size and function. · Right Atrium: Mildly dilated right atrium. · Mitral Valve: Mitral valve thickening. Mild mitral valve regurgitation   is present. · Aortic Valve: Mild aortic valve regurgitation is present. · Tricuspid Valve: Mild tricuspid valve regurgitation is present. · Pulmonary Artery: There is no evidence of pulmonary hypertension. · Aorta: Mildly dilated aortic root; diameter is 3.9 cm. Mildly dilated   ascending aorta; diameter is 4 cm. Signed by: Abdifatah Larry MD     12/04/19   NUCLEAR CARDIAC STRESS TEST 12/04/2019 12/4/2019    Narrative · Baseline ECG: Normal EKG, myocardial infarction. The infarction is   located in the inferior regions. .  · Negative stress test.  · Gated SPECT: Left ventricular function post-stress was normal.   Calculated ejection fraction is 64%. There is no evidence of transient   ischemic dilation (TID). The TID ratio is 0.88. · Left ventricular perfusion is normal.  · Negative myocardial perfusion imaging. Myocardial perfusion imaging   supports a low risk stress test.        Signed by: Jamil Pederson MD   10/19 cardiac event monitor  Paroxysmal atrial flutter. Mr. Nilo Carrasco has a reminder for a \"due or due soon\" health maintenance. I have asked that he contact his primary care provider for follow-up on this health maintenance. Physical Exam   Constitutional: He is oriented to person, place, and time. He appears well-developed and well-nourished.  No distress. HENT:   Head: Normocephalic and atraumatic. Mouth/Throat: Normal dentition. Eyes: Right eye exhibits no discharge. Left eye exhibits no discharge. No scleral icterus. Neck: Neck supple. No JVD present. Carotid bruit is not present. No thyromegaly present. Cardiovascular: Normal rate, regular rhythm, S1 normal, S2 normal, normal heart sounds and intact distal pulses. Exam reveals no gallop and no friction rub. No murmur heard. Pulmonary/Chest: Effort normal and breath sounds normal. He has no wheezes. He has no rales. Abdominal: Soft. He exhibits no mass. There is no abdominal tenderness. Musculoskeletal:         General: Edema (trace to 1+ right > left) present. Lymphadenopathy:        Right cervical: No superficial cervical adenopathy present. Left cervical: No superficial cervical adenopathy present. Neurological: He is alert and oriented to person, place, and time. Skin: Skin is warm and dry. No rash noted. Psychiatric: He has a normal mood and affect. His behavior is normal.       ASSESSMENT and PLAN    History of paroxysmal A. Fib. Last episode 2013 approximately per patient. Mitral and aortic regurgitation: Mild 10/19    Results for Blossom RICHARDSON. Raquel Alfonso (MRN [id number]) as of 9/13/2019 11:15   Ref. Range 2/1/2019 08:49   Triglyceride Latest Ref Range: <150 MG/DL 63   Cholesterol, total Latest Ref Range: <200 MG/   HDL Cholesterol Latest Ref Range: 40 - 60 MG/DL 46   CHOL/HDL Ratio Latest Ref Range: 0 - 5.0   3.0   LDL, calculated Latest Ref Range: 0 - 100 MG/DL 77.4   VLDL, calculated Latest Units: MG/DL 12.6           2/2020  Continues to c/o palpitations with SOB, dizziness and flushed sensation. These occur daily and last 5-20 minutes. Will increase propafenone to 300 mg/ TID. Check EKG in 1 week. If palpitations continue, will evaluate for arrhythmias with Holter monitor. To resume lasix and compression stockings for edema.   F/U with vascular as scheduled. Advised to wear CPAP consistently, this may improve SOB and palpitations. EKG - SR mild increase in QRS. Repeat EKG in 1 week. Diagnoses and all orders for this visit:    1. Atrial flutter, paroxysmal (HCC)  -     AMB POC EKG ROUTINE W/ 12 LEADS, INTER & REP    2. Essential hypertension    3. Mitral and aortic regurgitation    4. Aortic ectasia, thoracic (HCC)    5. Severe obesity (BMI 35.0-39. 9) with comorbidity [facility])        Pertinent laboratory and test data reviewed and discussed with patient. See patient instructions also for other medical advice given    There are no discontinued medications. I have independently evaluated and examined the patient. Mild aortic ectasia seen which will be followed by serial echoes. Discussed with patient importance of blood pressure control and weight loss which will help atrial arrhythmias, MR and lipids. Increase propafenone and follow-up EKGs. All relevant labs and testing data are reviewed. Care plan discussed and updated after review.   Sahra Jimenez MD

## 2020-02-24 NOTE — PROGRESS NOTES
1. Have you been to the ER, urgent care clinic since your last visit? Hospitalized since your last visit? No    2. Have you seen or consulted any other health care providers outside of the [address] [place] [address] since your last visit? Include any pap smears or colon screening. Internist/Establish Care    3. Since your last visit, have you had any of the following symptoms?      palpitations, shortness of breath and swelling in legs/arms. Explain: Right leg swelling     4. Have you had any blood work, X-rays or cardiac testing? Yes Where: PCP Reason for visit: Labs     Requested: NO     In [facility]: NO    5. Where do you normally have your labs drawn? PCP Office      6. Do you need any refills today?    No

## 2020-03-02 ENCOUNTER — CLINICAL SUPPORT (OUTPATIENT)
Dept: CARDIOLOGY CLINIC | Age: 65
End: 2020-03-02

## 2020-03-02 DIAGNOSIS — I48.0 PAROXYSMAL ATRIAL FIBRILLATION (HCC): Primary | ICD-10-CM

## 2020-03-06 NOTE — PROGRESS NOTES
Called patient and let know per Dr. Lennie Dominguez, EKG is in normal sinus. We will follow up in next appointment for palpitations. Patient verbalized understanding and had no further question at this time.

## 2020-03-16 ENCOUNTER — CLINICAL SUPPORT (OUTPATIENT)
Dept: VASCULAR SURGERY | Age: 65
End: 2020-03-16

## 2020-03-16 DIAGNOSIS — I87.2 VENOUS (PERIPHERAL) INSUFFICIENCY: Primary | ICD-10-CM

## 2020-03-16 DIAGNOSIS — M79.89 LEG SWELLING: Primary | ICD-10-CM

## 2020-03-16 RX ORDER — LORAZEPAM 1 MG/1
1 TABLET ORAL
Qty: 3 TAB | Refills: 0
Start: 2020-03-16 | End: 2020-09-10

## 2020-03-16 NOTE — TELEPHONE ENCOUNTER
Order for ativan 1 mg to be taken as directed and brought to procedure #3/0 placed per verbal order from Dr. Fabiola Benson.

## 2020-06-05 ENCOUNTER — OFFICE VISIT (OUTPATIENT)
Dept: CARDIOLOGY CLINIC | Age: 65
End: 2020-06-05

## 2020-06-05 VITALS
DIASTOLIC BLOOD PRESSURE: 81 MMHG | HEART RATE: 67 BPM | HEIGHT: 75 IN | TEMPERATURE: 98.6 F | OXYGEN SATURATION: 97 % | BODY MASS INDEX: 31.16 KG/M2 | SYSTOLIC BLOOD PRESSURE: 135 MMHG | WEIGHT: 250.6 LBS

## 2020-06-05 DIAGNOSIS — I77.810 AORTIC ECTASIA, THORACIC (HCC): ICD-10-CM

## 2020-06-05 DIAGNOSIS — G47.33 OSA ON CPAP: ICD-10-CM

## 2020-06-05 DIAGNOSIS — Z99.89 OSA ON CPAP: ICD-10-CM

## 2020-06-05 DIAGNOSIS — I48.92 ATRIAL FLUTTER, PAROXYSMAL (HCC): ICD-10-CM

## 2020-06-05 DIAGNOSIS — I10 ESSENTIAL HYPERTENSION: ICD-10-CM

## 2020-06-05 DIAGNOSIS — I08.0 MITRAL AND AORTIC REGURGITATION: ICD-10-CM

## 2020-06-05 DIAGNOSIS — I48.0 PAROXYSMAL ATRIAL FIBRILLATION (HCC): Primary | ICD-10-CM

## 2020-06-05 LAB — ABO + RH BLD: NORMAL

## 2020-06-05 RX ORDER — TIZANIDINE HYDROCHLORIDE 4 MG/1
4 CAPSULE, GELATIN COATED ORAL 3 TIMES DAILY
COMMUNITY
End: 2021-08-27 | Stop reason: ALTCHOICE

## 2020-06-05 RX ORDER — PROPAFENONE HYDROCHLORIDE 300 MG/1
300 TABLET, COATED ORAL 3 TIMES DAILY
Qty: 270 TAB | Refills: 3 | Status: SHIPPED | OUTPATIENT
Start: 2020-06-05 | End: 2021-07-09

## 2020-06-05 RX ORDER — AMOXICILLIN 500 MG/1
500 CAPSULE ORAL
COMMUNITY
End: 2020-09-10 | Stop reason: ALTCHOICE

## 2020-06-05 RX ORDER — PROPAFENONE HYDROCHLORIDE 300 MG/1
300 TABLET, COATED ORAL 3 TIMES DAILY
Qty: 90 TAB | Refills: 3 | Status: CANCELLED | OUTPATIENT
Start: 2020-06-05

## 2020-06-05 NOTE — PATIENT INSTRUCTIONS
Heart-Healthy Diet: Care Instructions  Your Care Instructions     A heart-healthy diet has lots of vegetables, fruits, nuts, beans, and whole grains, and is low in salt. It limits foods that are high in saturated fat, such as meats, cheeses, and fried foods. It may be hard to change your diet, but even small changes can lower your risk of heart attack and heart disease. Follow-up care is a key part of your treatment and safety. Be sure to make and go to all appointments, and call your doctor if you are having problems. It's also a good idea to know your test results and keep a list of the medicines you take. How can you care for yourself at home? Watch your portions  · Learn what a serving is. A \"serving\" and a \"portion\" are not always the same thing. Make sure that you are not eating larger portions than are recommended. For example, a serving of pasta is ½ cup. A serving size of meat is 2 to 3 ounces. A 3-ounce serving is about the size of a deck of cards. Measure serving sizes until you are good at [place]" them. Keep in mind that restaurants often serve portions that are 2 or 3 times the size of one serving. · To keep your energy level up and keep you from feeling hungry, eat often but in smaller portions. · Eat only the number of calories you need to stay at a healthy weight. If you need to lose weight, eat fewer calories than your body burns (through exercise and other physical activity). Eat more fruits and vegetables  · Eat a variety of fruit and vegetables every day. Dark green, deep orange, red, or yellow fruits and vegetables are especially good for you. Examples include spinach, carrots, peaches, and berries. · Keep carrots, celery, and other veggies handy for snacks. Buy fruit that is in season and store it where you can see it so that you will be tempted to eat it. · Cook dishes that have a lot of veggies in them, such as stir-fries and soups.   Limit saturated and trans fat  · Read food labels, and try to avoid saturated and trans fats. They increase your risk of heart disease. · Use olive or canola oil when you cook. · Bake, broil, grill, or steam foods instead of frying them. · Choose lean meats instead of high-fat meats such as hot dogs and sausages. Cut off all visible fat when you prepare meat. · Eat fish, skinless poultry, and meat alternatives such as soy products instead of high-fat meats. Soy products, such as tofu, may be especially good for your heart. · Choose low-fat or fat-free milk and dairy products. Eat foods high in fiber  · Eat a variety of grain products every day. Include whole-grain foods that have lots of fiber and nutrients. Examples of whole-grain foods include oats, whole wheat bread, and brown rice. · Buy whole-grain breads and cereals, instead of white bread or pastries. Limit salt and sodium  · Limit how much salt and sodium you eat to help lower your blood pressure. · Taste food before you salt it. Add only a little salt when you think you need it. With time, your taste buds will adjust to less salt. · Eat fewer snack items, fast foods, and other high-salt, processed foods. Check food labels for the amount of sodium in packaged foods. · Choose low-sodium versions of canned goods (such as soups, vegetables, and beans). Limit sugar  · Limit drinks and foods with added sugar. These include candy, desserts, and soda pop. Limit alcohol  · Limit alcohol to no more than 2 drinks a day for men and 1 drink a day for women. Too much alcohol can cause health problems. When should you call for help? Watch closely for changes in your health, and be sure to contact your doctor if:  · You would like help planning heart-healthy meals. Where can you learn more? Go to http://jamel-erika.info/  Enter V137 in the search box to learn more about \"Heart-Healthy Diet: Care Instructions. \"  Current as of: August 22, 2019               Content Version: 12.5  © 9230-4948 [employer]. Care instructions adapted under license by [employer] (which disclaims liability or warranty for this information). If you have questions about a medical condition or this instruction, always ask your healthcare professional. [address] any warranty or liability for your use of this information.

## 2020-06-05 NOTE — PROGRESS NOTES
HISTORY OF PRESENT ILLNESS  Keith Booth is a 77 y.o. male. 9/19 new patient just moved from [place]. Seen for preop clearance. Has chronic back problems and peripheral neuropathy. History of paroxysmal A. Fib. Last episode 2013 approximately per patient. Has good functional capacity as he can go up 2-3 flights of steps and can walk half to 1 mile on a level ground without stopping.  2/2020 He c/o palpitations with associated SOB and chest pain with flushed sensation. Episodes last about 20 minutes and resolve. He is anticipating right venous surgery in March.  6/2020 Denies palpitations, SOB or chest pain. Valvular Heart Disease   The history is provided by the patient and medical records (MR). Pertinent negatives include no chest pain, no headaches and no shortness of breath. Palpitations    The history is provided by the patient. This is a new problem. Episode onset: 5/19. The problem has been resolved. The problem occurs rarely (2-3/month). On average, each episode lasts 5 minutes. The problem is associated with nothing. Associated symptoms include lower extremity edema. Pertinent negatives include no diaphoresis, no fever, no malaise/fatigue, no chest pain, no claudication, no orthopnea, no PND, no nausea, no vomiting, no headaches, no dizziness, no cough and no shortness of breath. His past medical history is significant for hypertension. Hypertension   The history is provided by the medical records. This is a chronic problem. Pertinent negatives include no chest pain, no headaches and no shortness of breath. Chest Pain (Angina)    The history is provided by the patient. This is a new problem. The current episode started more than 1 week ago (yrs). The problem has been resolved. The problem occurs every several days (1/wk). The pain is associated with normal activity and rest. The pain is present in the lateral region and left side. The quality of the pain is described as dull.  The pain does not radiate. Associated symptoms include lower extremity edema. Pertinent negatives include no claudication, no cough, no diaphoresis, no dizziness, no fever, no headaches, no malaise/fatigue, no nausea, no orthopnea, no palpitations, no PND, no shortness of breath and no vomiting. Leg Swelling   The history is provided by the patient. This is a new problem. The current episode started more than 1 week ago (3/19). The problem occurs constantly. The problem has not changed since onset. Pertinent negatives include no chest pain, no headaches and no shortness of breath. Nothing aggravates the symptoms. Review of Systems   Constitutional: Negative for chills, diaphoresis, fever, malaise/fatigue and weight loss. HENT: Negative for nosebleeds. Eyes: Negative for discharge. Respiratory: Negative for cough, shortness of breath and wheezing. Cardiovascular: Positive for leg swelling. Negative for chest pain, palpitations, orthopnea, claudication and PND. Gastrointestinal: Negative for diarrhea, nausea and vomiting. Genitourinary: Negative for dysuria and hematuria. Musculoskeletal: Negative for joint pain. Skin: Negative for rash. Neurological: Negative for dizziness, seizures, loss of consciousness and headaches. Endo/Heme/Allergies: Negative for polydipsia. Does not bruise/bleed easily. Psychiatric/Behavioral: Negative for depression and substance abuse. The patient does not have insomnia.       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    Cancer [facility] [address]     Depression     Endocrine disease     Hypothyroidism    Fractures ,     ankle left, left wrist    Headache     Hearing loss     Hypertension     Ill-defined condition     Pain stimulator in back    Ill-defined condition     irregular heartbeat    Nausea & vomiting     Psychiatric disorder     depression    Sleep apnea     on cpap    Thyroid disease        Family History   Problem Relation Age of Onset    Arthritis-osteo Mother     Bleeding Prob Mother     Cancer Mother     Headache Mother     Hypertension Mother     Migraines Mother     Stroke Mother 68    Arthritis-osteo Father     Stroke Father 80    Arthritis-osteo Sister     Cancer Sister     Migraines Sister     Arthritis-osteo Brother     Cancer Brother     Headache Brother     Migraines Brother     Stroke Paternal Uncle     Heart Surgery Paternal Uncle 72    Diabetes Other        Social History     Tobacco Use    Smoking status: Former Smoker     Last attempt to quit:      Years since quittin.4    Smokeless tobacco: Never Used   Substance Use Topics    Alcohol use: No     Frequency: Never    Drug use: No        Current Outpatient Medications   Medication Sig    tiZANidine (ZANAFLEX) 4 mg capsule Take 4 mg by mouth three (3) times daily.  amoxicillin (AMOXIL) 500 mg capsule Take 500 mg by mouth.  propafenone (RYTHMOL) 300 mg tablet Take 1 Tab by mouth three (3) times daily.  LORazepam (ATIVAN) 1 mg tablet Take 1 Tab by mouth every four (4) hours as needed for Anxiety. Max Daily Amount: 6 mg.    ELIQUIS 5 mg tablet TAKE 1 TABLET BY MOUTH TWICE DAILY    escitalopram oxalate (LEXAPRO) 20 mg tablet Take 20 mg by mouth daily. Take 1/2 (one-half) tablet by mouth once daily in the morning for 6 days then 1 once daily in the morning    lisinopril (PRINIVIL, ZESTRIL) 20 mg tablet Take 1 Tab by mouth daily.     furosemide (LASIX) 20 mg tablet Take 0.5 Tabs by mouth daily.  busPIRone (BUSPAR) 10 mg tablet TAKE 1 TABLET BY MOUTH THREE TIMES DAILY    buPROPion SR (WELLBUTRIN SR) 150 mg SR tablet TAKE 1 TABLET BY MOUTH ONCE DAILY    linaCLOtide (LINZESS) 72 mcg cap capsule Take 1 Cap by mouth daily.  testosterone cypionate (DEPOTESTOTERONE CYPIONATE) 200 mg/mL injection 0.5 mL by IntraMUSCular route every seven (7) days. Max Daily Amount: 100 mg.    Needle, Disp, 21 G (HYPODERMIC NEEDLES) 21 gauge x 1\" ndle Inject 0.5ml  intramuscular route every 7 days    Syringe with Needle, Disp, (MONOJECT SAFETY SYRINGES) syrg To be used with medication    esomeprazole (NEXIUM) 40 mg capsule Take 1 Cap by mouth daily.  cholecalciferol (VITAMIN D3) 1,000 unit tablet Take  by mouth daily.  HYDROcodone-acetaminophen (NORCO)  mg tablet Take 1 Tab by mouth every twelve (12) hours as needed for Pain. Max Daily Amount: 2 Tabs.  amLODIPine (NORVASC) 5 mg tablet Take 1 Tab by mouth daily.  levothyroxine (SYNTHROID) 100 mcg tablet Take 1 Tab by mouth Daily (before breakfast). No current facility-administered medications for this visit. Past Surgical History:   Procedure Laterality Date    HX BACK SURGERY  10/10/2019    Revision SCS    HX COLONOSCOPY      HX HEENT  2003    Reconstructive surgery on face    HX KNEE REPLACEMENT  2015/2016    bilat knees    HX ORTHOPAEDIC  [id number]    back surgery X4       Visit Vitals  /81 (BP 1 Location: Left arm, BP Patient Position: Sitting)   Pulse 67   Temp 98.6 °F (37 °C) (Temporal)   Ht 6' 3\" (1.905 m)   Wt 113.7 kg (250 lb 9.6 oz)   SpO2 97%   BMI 31.32 kg/m²       Diagnostic Studies:  I have reviewed the relevant tests done on the patient and show as follows  EKG tracings reviewed by me today.     EKG Results     Procedure 720 Value Units Date/Time    AMB POC EKG ROUTINE W/ 12 LEADS, INTER & REP [[id number]]     Order Status:  Sent     AMB POC EKG ROUTINE W/ 12 LEADS, INTER & REP [[id number]] Resulted: 06/05/20 1207    Order Status:  Completed Updated:  06/05/20 1222     ABO/Rh(D) --        XR Results (most recent):  Results from [place] encounter on 06/27/19   NC XR TECHNOLOGIST SERVICE    Narrative Fluoroscopy was provided for a bundled exam for documentation purposes. Impression IMPRESSION:    Please see above. FLUORO TIME: 00.08    AJB         11/04/19   ECHO ADULT COMPLETE 11/04/2019 11/4/2019    Narrative · Left Ventricle: Normal cavity size and systolic function (ejection   fraction normal). Mildly to moderately increased wall thickness. Estimated   left ventricular ejection fraction is 56 - 60%. Moderate (grade 2) left   ventricular diastolic dysfunction. · Right Ventricle: Normal right ventricular size and function. · Right Atrium: Mildly dilated right atrium. · Mitral Valve: Mitral valve thickening. Mild mitral valve regurgitation   is present. · Aortic Valve: Mild aortic valve regurgitation is present. · Tricuspid Valve: Mild tricuspid valve regurgitation is present. · Pulmonary Artery: There is no evidence of pulmonary hypertension. · Aorta: Mildly dilated aortic root; diameter is 3.9 cm. Mildly dilated   ascending aorta; diameter is 4 cm. Signed by: Humberto Poole MD     12/04/19   NUCLEAR CARDIAC STRESS TEST 12/04/2019 12/4/2019    Narrative · Baseline ECG: Normal EKG, myocardial infarction. The infarction is   located in the inferior regions. .  · Negative stress test.  · Gated SPECT: Left ventricular function post-stress was normal.   Calculated ejection fraction is 64%. There is no evidence of transient   ischemic dilation (TID). The TID ratio is 0.88. · Left ventricular perfusion is normal.  · Negative myocardial perfusion imaging. Myocardial perfusion imaging   supports a low risk stress test.        Signed by: Carmelita Bond MD   10/19 cardiac event monitor  Paroxysmal atrial flutter.     Mr. Jannette Garza has a reminder for a \"due or due soon\" health maintenance. I have asked that he contact his primary care provider for follow-up on this health maintenance. Physical Exam   Constitutional: He is oriented to person, place, and time. He appears well-developed and well-nourished. No distress. HENT:   Head: Normocephalic and atraumatic. Mouth/Throat: Normal dentition. Eyes: Right eye exhibits no discharge. Left eye exhibits no discharge. No scleral icterus. Neck: Neck supple. No JVD present. Carotid bruit is not present. No thyromegaly present. Cardiovascular: Normal rate, regular rhythm, S1 normal, S2 normal, normal heart sounds and intact distal pulses. Exam reveals no gallop and no friction rub. No murmur heard. Pulmonary/Chest: Effort normal and breath sounds normal. He has no wheezes. He has no rales. Abdominal: Soft. He exhibits no mass. There is no abdominal tenderness. Musculoskeletal:         General: Edema (trace to 1+ right > left) present. Lymphadenopathy:        Right cervical: No superficial cervical adenopathy present. Left cervical: No superficial cervical adenopathy present. Neurological: He is alert and oriented to person, place, and time. Skin: Skin is warm and dry. No rash noted. Psychiatric: He has a normal mood and affect. His behavior is normal.       ASSESSMENT and PLAN    History of paroxysmal A. Fib. Last episode 2013 approximately per patient. Mitral and aortic regurgitation: Mild 10/19    Results for Jody RICHARDSON Roma Mccullough (MRN [id number]) as of 9/13/2019 11:15   Ref. Range 2/1/2019 08:49   Triglyceride Latest Ref Range: <150 MG/DL 63   Cholesterol, total Latest Ref Range: <200 MG/   HDL Cholesterol Latest Ref Range: 40 - 60 MG/DL 46   CHOL/HDL Ratio Latest Ref Range: 0 - 5.0   3.0   LDL, calculated Latest Ref Range: 0 - 100 MG/DL 77.4   VLDL, calculated Latest Units: MG/DL 12.6       2/2020  Continues to c/o palpitations with SOB, dizziness and flushed sensation.   These occur daily and last 5-20 minutes. Will increase propafenone to 300 mg/ TID. Check EKG in 1 week. If palpitations continue, will evaluate for arrhythmias with Holter monitor. To resume lasix and compression stockings for edema. F/U with vascular as scheduled. Advised to wear CPAP consistently, this may improve SOB and palpitations. EKG - SR mild increase in QRS. Repeat EKG in 1 week. 6/2020  Reports palpitations have resolved with increased dose of prpafenone to 300 mg / TID. EKG with  - will monitor and repeat EKG in 3 months. He has lost 23 pounds since LOV and was congratulated. Diet and exercise encouraged to promote further weight loss. Continues to have edema to RLE. He is to have venous closure to saphenous vein of right leg. Diagnoses and all orders for this visit:    1. Paroxysmal atrial fibrillation (HCC)  -     AMB POC EKG ROUTINE W/ 12 LEADS, INTER & REP  -     AMB POC EKG ROUTINE W/ 12 LEADS, INTER & REP  -     propafenone (RYTHMOL) 300 mg tablet; Take 1 Tab by mouth three (3) times daily. 2. Essential hypertension    3. Mitral and aortic regurgitation    4. Aortic ectasia, thoracic (HCC)    5. MARIUSZ on CPAP    6. Atrial flutter, paroxysmal ([address]        Pertinent laboratory and test data reviewed and discussed with patient. See patient instructions also for other medical advice given    Medications Discontinued During This Encounter   Medication Reason    acetaminophen (TYLENOL) 325 mg tablet     propafenone (RYTHMOL) 300 mg tablet Reorder       Follow-up and Dispositions    · Return in about 6 months (around 12/5/2020) for with EKG. Torrey Jurado NP    I have independently evaluated and examined the patient. Mild aortic ectasia seen which will be followed by serial echoes. Patient is losing weight and feeling better. EKG shows mildly increased QRS duration to 117 ms and normal QTC. Continue same medications.   More frequent EKGs will be done for few times, the next one in 3 months. All relevant labs and testing data are reviewed. Care plan discussed and updated after review.

## 2020-06-05 NOTE — PROGRESS NOTES
1. Have you been to the ER, urgent care clinic since your last visit? Hospitalized since your last visit?     no    2. Have you seen or consulted any other health care providers outside of the [address] [place] [address] since your last visit? Include any pap smears or colon screening. Yes When: x 1 month ago  Where: virtual visit Reason for visit: routine     3. Since your last visit, have you had any of the following symptoms?      dizziness. Explain:started x 1 month ago, occasionally      4. Have you had any blood work, X-rays or cardiac testing? No      5. Where do you normally have your labs drawn? PCP    6. Do you need any refills today?    no

## 2020-07-17 ENCOUNTER — OFFICE VISIT (OUTPATIENT)
Dept: CARDIOLOGY CLINIC | Age: 65
End: 2020-07-17

## 2020-07-17 VITALS
SYSTOLIC BLOOD PRESSURE: 115 MMHG | HEART RATE: 66 BPM | HEIGHT: 75 IN | BODY MASS INDEX: 30.61 KG/M2 | TEMPERATURE: 97.9 F | WEIGHT: 246.2 LBS | DIASTOLIC BLOOD PRESSURE: 69 MMHG

## 2020-07-17 DIAGNOSIS — I48.0 PAROXYSMAL ATRIAL FIBRILLATION (HCC): ICD-10-CM

## 2020-07-17 DIAGNOSIS — E66.9 OBESITY (BMI 30.0-34.9): ICD-10-CM

## 2020-07-17 DIAGNOSIS — I95.1 ORTHOSTASIS: ICD-10-CM

## 2020-07-17 DIAGNOSIS — I08.0 MITRAL AND AORTIC REGURGITATION: ICD-10-CM

## 2020-07-17 DIAGNOSIS — I77.810 AORTIC ECTASIA, THORACIC (HCC): Primary | ICD-10-CM

## 2020-07-17 DIAGNOSIS — I10 ESSENTIAL HYPERTENSION: ICD-10-CM

## 2020-07-17 NOTE — PROGRESS NOTES
1. Have you been to the ER, urgent care clinic since your last visit? Hospitalized since your last visit?     no    2. Have you seen or consulted any other health care providers outside of the [address] [place] [address] since your last visit? Include any pap smears or colon screening. Yes When: x 1 week at PCP      3. Since your last visit, have you had any of the following symptoms?      dizziness. Explain:constant when changing positions, did orthostatics at PCP office said there is a 20 point difference     4. Have you had any blood work, X-rays or cardiac testing? No    5. Where do you normally have your labs drawn? PCP    6. Do you need any refills today?    no

## 2020-07-17 NOTE — H&P (VIEW-ONLY)
HISTORY OF PRESENT ILLNESS Starlet Lefort. is a 77 y.o. male. 9/19 new patient just moved from [place]. Seen for preop clearance. Has chronic back problems and peripheral neuropathy. History of paroxysmal A. Fib. Last episode 2013 approximately per patient. Has good functional capacity as he can go up 2-3 flights of steps and can walk half to 1 mile on a level ground without stopping. 
2/2020 He c/o palpitations with associated SOB and chest pain with flushed sensation. Episodes last about 20 minutes and resolve. He is anticipating right venous surgery in March. 
6/2020 Denies palpitations, SOB or chest pain. Valvular Heart Disease The history is provided by the patient and medical records (MR). Pertinent negatives include no chest pain, no headaches and no shortness of breath. Palpitations The history is provided by the patient (PAF). This is a new problem. Episode onset: 5/19. The problem has been resolved. The problem occurs rarely (2-3/month). On average, each episode lasts 5 minutes. The problem is associated with nothing. Associated symptoms include lower extremity edema and dizziness. Pertinent negatives include no diaphoresis, no fever, no malaise/fatigue, no chest pain, no claudication, no orthopnea, no PND, no nausea, no vomiting, no headaches, no cough and no shortness of breath. His past medical history is significant for hypertension. Hypertension The history is provided by the medical records. This is a chronic problem. Pertinent negatives include no chest pain, no headaches and no shortness of breath. Leg Swelling The history is provided by the patient. This is a new problem. The current episode started more than 1 week ago (3/19). The problem occurs constantly. The problem has been gradually improving. Pertinent negatives include no chest pain, no headaches and no shortness of breath. Nothing aggravates the symptoms. Dizziness The history is provided by the patient. This is a new problem. The current episode started more than 1 week ago (7/20). The problem occurs daily. Pertinent negatives include no chest pain, no headaches and no shortness of breath. The symptoms are aggravated by standing. Review of Systems Constitutional: Negative for chills, diaphoresis, fever, malaise/fatigue and weight loss. HENT: Negative for nosebleeds. Eyes: Negative for discharge. Respiratory: Negative for cough, shortness of breath and wheezing. Cardiovascular: Positive for palpitations and leg swelling. Negative for chest pain, orthopnea, claudication and PND. Gastrointestinal: Negative for diarrhea, nausea and vomiting. Genitourinary: Negative for dysuria and hematuria. Musculoskeletal: Negative for joint pain. Skin: Negative for rash. Neurological: Positive for dizziness. Negative for seizures, loss of consciousness and headaches. Endo/Heme/Allergies: Negative for polydipsia. Does not bruise/bleed easily. Psychiatric/Behavioral: Negative for depression and substance abuse. The patient does not have insomnia. Allergies Allergen Reactions  Latex Itching  Codeine Rash  Codeine Hives  Doxycycline Other (comments) Dark urine  Doxycycline Itching Urine turned black  Topamax [Topiramate] Itching Rash, itching  Topamax [Topiramate] Hives  Verapamil Other (comments)  
  tachycardia  Verapamil Palpitations Past Medical History:  
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  
 ankle left, left wrist  
 Headache   
 Hearing loss 2013  Hypertension  Ill-defined condition Pain stimulator in back  Ill-defined condition   
 irregular heartbeat  Nausea & vomiting  Psychiatric disorder   
 depression  Sleep apnea   
 on cpap  Thyroid disease Family History Problem Relation Age of Onset 24 [facility] Arthritis-osteo Mother  Bleeding Prob Mother  Cancer Mother  Headache Mother  Hypertension Mother  Migraines Mother  Stroke Mother 68  Arthritis-osteo Father 24 [facility] Stroke Father 80  Arthritis-osteo Sister  Cancer Sister  Migraines Sister  Arthritis-osteo Brother  Cancer Brother  Headache Brother  Migraines Brother  Stroke Paternal Uncle  Heart Surgery Paternal Uncle 72  Diabetes Other Social History Tobacco Use  Smoking status: Former Smoker Last attempt to quit:  Years since quittin.5  Smokeless tobacco: Never Used Substance Use Topics  Alcohol use: No  
  Frequency: Never  Drug use: No  
  
 
Current Outpatient Medications Medication Sig  tiZANidine (ZANAFLEX) 4 mg capsule Take 4 mg by mouth three (3) times daily.  propafenone (RYTHMOL) 300 mg tablet Take 1 Tab by mouth three (3) times daily.  LORazepam (ATIVAN) 1 mg tablet Take 1 Tab by mouth every four (4) hours as needed for Anxiety. Max Daily Amount: 6 mg.  
 ELIQUIS 5 mg tablet TAKE 1 TABLET BY MOUTH TWICE DAILY  escitalopram oxalate (LEXAPRO) 20 mg tablet Take 20 mg by mouth daily. Take 1/2 (one-half) tablet by mouth once daily in the morning for 6 days then 1 once daily in the morning  lisinopril (PRINIVIL, ZESTRIL) 20 mg tablet Take 1 Tab by mouth daily.  furosemide (LASIX) 20 mg tablet Take 0.5 Tabs by mouth daily.  busPIRone (BUSPAR) 10 mg tablet TAKE 1 TABLET BY MOUTH THREE TIMES DAILY (Patient taking differently: Take 10 mg by mouth four (4) times daily as needed (anxiety). )  buPROPion SR (WELLBUTRIN SR) 150 mg SR tablet TAKE 1 TABLET BY MOUTH ONCE DAILY  linaCLOtide (LINZESS) 72 mcg cap capsule Take 1 Cap by mouth daily.  testosterone cypionate (DEPOTESTOTERONE CYPIONATE) 200 mg/mL injection 0.5 mL by IntraMUSCular route every seven (7) days. Max Daily Amount: 100 mg. (Patient taking differently: 100 mg by IntraMUSCular route Twice a month.)  esomeprazole (NEXIUM) 40 mg capsule Take 1 Cap by mouth daily.  HYDROcodone-acetaminophen (NORCO)  mg tablet Take 1 Tab by mouth every twelve (12) hours as needed for Pain. Max Daily Amount: 2 Tabs.  amLODIPine (NORVASC) 5 mg tablet Take 1 Tab by mouth daily.  levothyroxine (SYNTHROID) 100 mcg tablet Take 1 Tab by mouth Daily (before breakfast).  amoxicillin (AMOXIL) 500 mg capsule Take 500 mg by mouth.  Needle, Disp, 21 G (HYPODERMIC NEEDLES) 21 gauge x 1\" ndle Inject 0.5ml  intramuscular route every 7 days  Syringe with Needle, Disp, (MONOJECT SAFETY SYRINGES) syrg To be used with medication  cholecalciferol (VITAMIN D3) 1,000 unit tablet Take  by mouth daily. No current facility-administered medications for this visit. Past Surgical History:  
Procedure Laterality Date  HX BACK SURGERY  10/10/2019 Revision SCS  
 HX COLONOSCOPY    
 HX HEENT  2003 Reconstructive surgery on face  HX KNEE REPLACEMENT  2015/2016  
 bilat knees  HX ORTHOPAEDIC  0806-6579  
 back surgery X4 Visit Vitals /69 (BP 1 Location: Right arm, BP Patient Position: Sitting) Pulse 66 Temp 97.9 °F (36.6 °C) (Temporal) Ht 6' 3\" (1.905 m) Wt 111.7 kg (246 lb 3.2 oz) BMI 30.77 kg/m² Diagnostic Studies: 
I have reviewed the relevant tests done on the patient and show as follows EKG tracings reviewed by me today. EKG Results None XR Results (most recent): 
Results from Hospital Encounter encounter on 06/27/19 [facility] TECHNOLOGIST SERVICE Narrative Fluoroscopy was provided for a bundled exam for documentation purposes. Impression IMPRESSION: 
 
Please see above. FLUORO TIME: 00.08 AJB 
  
 
 
11/04/19 ECHO ADULT COMPLETE 11/04/2019 11/4/2019 Narrative · Left Ventricle: Normal cavity size and systolic function (ejection  
fraction normal). Mildly to moderately increased wall thickness. Estimated  
left ventricular ejection fraction is 56 - 60%. Moderate (grade 2) left  
ventricular diastolic dysfunction. · Right Ventricle: Normal right ventricular size and function. · Right Atrium: Mildly dilated right atrium. · Mitral Valve: Mitral valve thickening. Mild mitral valve regurgitation  
is present. · Aortic Valve: Mild aortic valve regurgitation is present. · Tricuspid Valve: Mild tricuspid valve regurgitation is present. · Pulmonary Artery: There is no evidence of pulmonary hypertension. · Aorta: Mildly dilated aortic root; diameter is 3.9 cm. Mildly dilated  
ascending aorta; diameter is 4 cm. Signed by: Terrie Lloyd MD  
 
12/04/19 NUCLEAR CARDIAC STRESS TEST 12/04/2019 12/4/2019 Narrative · Baseline ECG: Normal EKG, myocardial infarction. The infarction is  
located in the inferior regions. . 
· Negative stress test. 
· Gated SPECT: Left ventricular function post-stress was normal.  
Calculated ejection fraction is 64%. There is no evidence of transient  
ischemic dilation (TID). The TID ratio is 0.88. · Left ventricular perfusion is normal. 
· Negative myocardial perfusion imaging. Myocardial perfusion imaging  
supports a low risk stress test. 
   
  Signed by: Kamryn Sánchez MD  
10/19 cardiac event monitor Paroxysmal atrial flutter. Mr. Yosef Fairchild has a reminder for a \"due or due soon\" health maintenance. I have asked that he contact his primary care provider for follow-up on this health maintenance. Physical Exam  
Constitutional: He is oriented to person, place, and time. He appears well-developed and well-nourished. No distress. HENT:  
Head: Normocephalic and atraumatic. Mouth/Throat: Normal dentition. Eyes: Right eye exhibits no discharge. Left eye exhibits no discharge. No scleral icterus. Neck: Neck supple. No JVD present. Carotid bruit is not present. No thyromegaly present. Cardiovascular: Normal rate, regular rhythm, S1 normal, S2 normal, normal heart sounds and intact distal pulses. Exam reveals no gallop and no friction rub. No murmur heard. Pulmonary/Chest: Effort normal and breath sounds normal. He has no wheezes. He has no rales. Abdominal: Soft. He exhibits no mass. There is no abdominal tenderness. Musculoskeletal:     
   General: Edema (trace ) present. Lymphadenopathy:  
     Right cervical: No superficial cervical adenopathy present. Left cervical: No superficial cervical adenopathy present. Neurological: He is alert and oriented to person, place, and time. Skin: Skin is warm and dry. No rash noted. Psychiatric: He has a normal mood and affect. His behavior is normal.  
 
 
ASSESSMENT and PLAN History of paroxysmal A. Fib. Last episode 2013 approximately per patient. Mitral and aortic regurgitation: Mild 10/19 Results for Pastor Paige Eugene (MRN [id number]) as of 9/13/2019 11:15 Ref. Range 2/1/2019 08:49 Triglyceride Latest Ref Range: <150 MG/DL 63 Cholesterol, total Latest Ref Range: <200 MG/ HDL Cholesterol Latest Ref Range: 40 - 60 MG/DL 46  
CHOL/HDL Ratio Latest Ref Range: 0 - 5.0   3.0 LDL, calculated Latest Ref Range: 0 - 100 MG/DL 77.4 VLDL, calculated Latest Units: MG/DL 12.6  
 
 
2/2020 Continues to c/o palpitations with SOB, dizziness and flushed sensation. These occur daily and last 5-20 minutes. Will increase propafenone to 300 mg/ TID. Check EKG in 1 week. If palpitations continue, will evaluate for arrhythmias with Holter monitor. To resume lasix and compression stockings for edema. F/U with vascular as scheduled. Advised to wear CPAP consistently, this may improve SOB and palpitations.  EKG - SR mild increase in QRS. Repeat EKG in 1 week. 6/2020 Reports palpitations have resolved with increased dose of prpafenone to 300 mg / TID. EKG with  - will monitor and repeat EKG in 3 months. He has lost 23 pounds since LOV and was congratulated. Diet and exercise encouraged to promote further weight loss. Continues to have edema to RLE. He is to have venous closure to saphenous vein of right leg. 
 
7/20 patient complains of mild dizziness which is new and had orthostatic changes in PCPs office. It is likely secondary to volume depletion and weight loss. Will discontinue diuretics and follow home BP chart. Continue other medications but if the BP stays in the low normal range, will reduce other medications gradually as well. Diagnoses and all orders for this visit: 1. Aortic ectasia, thoracic ([address] 2. Essential hypertension 
-     [facility] [place] 3. Mitral and aortic regurgitation 4. Paroxysmal atrial fibrillation (HCC) 5. Obesity (BMI 30.0-34.9) 6. Orthostasis 
-     [facility] Pertinent laboratory and test data reviewed and discussed with patient. See patient instructions also for other medical advice given Medications Discontinued During This Encounter Medication Reason  furosemide (LASIX) 20 mg tablet Side Effects Follow-up and Dispositions · Return in about 3 months (around 10/17/2020), or if symptoms worsen or fail to improve, for with ekg.

## 2020-07-17 NOTE — PATIENT INSTRUCTIONS
Medications Discontinued During This Encounter   Medication Reason    furosemide (LASIX) 20 mg tablet Side Effects     After the recommended changes have been made in blood pressure medicines, patient advised to keep BP/HR(pulse rate) chart twice daily and bring us results in next 1 week or so. Patient may send the results via \"My Chart\" if desired. Please rest for 5-10 minutes before checking blood pressure. Sit on a comfortable chair without crossing the legs and put your arm on a table. We recommend that you use an upper arm cuff. Check the blood pressure 3 times weekly and take the lowest reading. If you check the blood pressure in both arms, use the higher reading.

## 2020-07-17 NOTE — PROGRESS NOTES
HISTORY OF PRESENT ILLNESS  Una Hendrix is a 77 y.o. male. 9/19 new patient just moved from [place]. Seen for preop clearance. Has chronic back problems and peripheral neuropathy. History of paroxysmal A. Fib. Last episode 2013 approximately per patient. Has good functional capacity as he can go up 2-3 flights of steps and can walk half to 1 mile on a level ground without stopping.  2/2020 He c/o palpitations with associated SOB and chest pain with flushed sensation. Episodes last about 20 minutes and resolve. He is anticipating right venous surgery in March.  6/2020 Denies palpitations, SOB or chest pain. Valvular Heart Disease   The history is provided by the patient and medical records (MR). Pertinent negatives include no chest pain, no headaches and no shortness of breath. Palpitations    The history is provided by the patient (PAF). This is a new problem. Episode onset: 5/19. The problem has been resolved. The problem occurs rarely (2-3/month). On average, each episode lasts 5 minutes. The problem is associated with nothing. Associated symptoms include lower extremity edema and dizziness. Pertinent negatives include no diaphoresis, no fever, no malaise/fatigue, no chest pain, no claudication, no orthopnea, no PND, no nausea, no vomiting, no headaches, no cough and no shortness of breath. His past medical history is significant for hypertension. Hypertension   The history is provided by the medical records. This is a chronic problem. Pertinent negatives include no chest pain, no headaches and no shortness of breath. Leg Swelling   The history is provided by the patient. This is a new problem. The current episode started more than 1 week ago (3/19). The problem occurs constantly. The problem has been gradually improving. Pertinent negatives include no chest pain, no headaches and no shortness of breath. Nothing aggravates the symptoms.    Dizziness   The history is provided by the patient. This is a new problem. The current episode started more than 1 week ago (7/20). The problem occurs daily. Pertinent negatives include no chest pain, no headaches and no shortness of breath. The symptoms are aggravated by standing. Review of Systems   Constitutional: Negative for chills, diaphoresis, fever, malaise/fatigue and weight loss. HENT: Negative for nosebleeds. Eyes: Negative for discharge. Respiratory: Negative for cough, shortness of breath and wheezing. Cardiovascular: Positive for palpitations and leg swelling. Negative for chest pain, orthopnea, claudication and PND. Gastrointestinal: Negative for diarrhea, nausea and vomiting. Genitourinary: Negative for dysuria and hematuria. Musculoskeletal: Negative for joint pain. Skin: Negative for rash. Neurological: Positive for dizziness. Negative for seizures, loss of consciousness and headaches. Endo/Heme/Allergies: Negative for polydipsia. Does not bruise/bleed easily. Psychiatric/Behavioral: Negative for depression and substance abuse. The patient does not have insomnia.       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    Cancer ([facility] [address]     Depression     Endocrine disease     Hypothyroidism    Fractures 1978, 2000    ankle left, left wrist    Headache     Hearing loss 2013    Hypertension     Ill-defined condition     Pain stimulator in back    Ill-defined condition     irregular heartbeat    Nausea & vomiting     Psychiatric disorder depression    Sleep apnea     on cpap    Thyroid disease        Family History   Problem Relation Age of Onset    Arthritis-osteo Mother     Bleeding Prob Mother     Cancer Mother     Headache Mother     Hypertension Mother     Migraines Mother     Stroke Mother 68    Arthritis-osteo Father     Stroke Father 80    Arthritis-osteo Sister     Cancer Sister     Migraines Sister     Arthritis-osteo Brother     Cancer Brother     Headache Brother     Migraines Brother     Stroke Paternal Uncle     Heart Surgery Paternal Uncle 72    Diabetes Other        Social History     Tobacco Use    Smoking status: Former Smoker     Last attempt to quit:      Years since quittin.5    Smokeless tobacco: Never Used   Substance Use Topics    Alcohol use: No     Frequency: Never    Drug use: No        Current Outpatient Medications   Medication Sig    tiZANidine (ZANAFLEX) 4 mg capsule Take 4 mg by mouth three (3) times daily.  propafenone (RYTHMOL) 300 mg tablet Take 1 Tab by mouth three (3) times daily.  LORazepam (ATIVAN) 1 mg tablet Take 1 Tab by mouth every four (4) hours as needed for Anxiety. Max Daily Amount: 6 mg.    ELIQUIS 5 mg tablet TAKE 1 TABLET BY MOUTH TWICE DAILY    escitalopram oxalate (LEXAPRO) 20 mg tablet Take 20 mg by mouth daily. Take 1/2 (one-half) tablet by mouth once daily in the morning for 6 days then 1 once daily in the morning    lisinopril (PRINIVIL, ZESTRIL) 20 mg tablet Take 1 Tab by mouth daily.  furosemide (LASIX) 20 mg tablet Take 0.5 Tabs by mouth daily.  busPIRone (BUSPAR) 10 mg tablet TAKE 1 TABLET BY MOUTH THREE TIMES DAILY (Patient taking differently: Take 10 mg by mouth four (4) times daily as needed (anxiety). )    buPROPion SR (WELLBUTRIN SR) 150 mg SR tablet TAKE 1 TABLET BY MOUTH ONCE DAILY    linaCLOtide (LINZESS) 72 mcg cap capsule Take 1 Cap by mouth daily.     testosterone cypionate (DEPOTESTOTERONE CYPIONATE) 200 mg/mL injection 0.5 mL by IntraMUSCular route every seven (7) days. Max Daily Amount: 100 mg. (Patient taking differently: 100 mg by IntraMUSCular route Twice a month.)    esomeprazole (NEXIUM) 40 mg capsule Take 1 Cap by mouth daily.  HYDROcodone-acetaminophen (NORCO)  mg tablet Take 1 Tab by mouth every twelve (12) hours as needed for Pain. Max Daily Amount: 2 Tabs.  amLODIPine (NORVASC) 5 mg tablet Take 1 Tab by mouth daily.  levothyroxine (SYNTHROID) 100 mcg tablet Take 1 Tab by mouth Daily (before breakfast).  amoxicillin (AMOXIL) 500 mg capsule Take 500 mg by mouth.  Needle, Disp, 21 G (HYPODERMIC NEEDLES) 21 gauge x 1\" ndle Inject 0.5ml  intramuscular route every 7 days    Syringe with Needle, Disp, (MONOJECT SAFETY SYRINGES) syrg To be used with medication    cholecalciferol (VITAMIN D3) 1,000 unit tablet Take  by mouth daily. No current facility-administered medications for this visit. Past Surgical History:   Procedure Laterality Date    HX BACK SURGERY  10/10/2019    Revision SCS    HX COLONOSCOPY      HX HEENT  2003    Reconstructive surgery on face    HX KNEE REPLACEMENT  2015/2016    bilat knees    HX ORTHOPAEDIC  [id number]    back surgery X4       Visit Vitals  /69 (BP 1 Location: Right arm, BP Patient Position: Sitting)   Pulse 66   Temp 97.9 °F (36.6 °C) (Temporal)   Ht 6' 3\" (1.905 m)   Wt 111.7 kg (246 lb 3.2 oz)   BMI 30.77 kg/m²       Diagnostic Studies:  I have reviewed the relevant tests done on the patient and show as follows  EKG tracings reviewed by me today. EKG Results     None        XR Results (most recent):  Results from Hospital Encounter encounter on 06/27/19   [facility] XR TECHNOLOGIST SERVICE    Narrative Fluoroscopy was provided for a bundled exam for documentation purposes. Impression IMPRESSION:    Please see above.     FLUORO TIME: 00.08    AJB         11/04/19   ECHO ADULT COMPLETE 11/04/2019 11/4/2019    Narrative · Left Ventricle: Normal cavity size and systolic function (ejection   fraction normal). Mildly to moderately increased wall thickness. Estimated   left ventricular ejection fraction is 56 - 60%. Moderate (grade 2) left   ventricular diastolic dysfunction. · Right Ventricle: Normal right ventricular size and function. · Right Atrium: Mildly dilated right atrium. · Mitral Valve: Mitral valve thickening. Mild mitral valve regurgitation   is present. · Aortic Valve: Mild aortic valve regurgitation is present. · Tricuspid Valve: Mild tricuspid valve regurgitation is present. · Pulmonary Artery: There is no evidence of pulmonary hypertension. · Aorta: Mildly dilated aortic root; diameter is 3.9 cm. Mildly dilated   ascending aorta; diameter is 4 cm. Signed by: Silvano Caldwell MD     12/04/19   NUCLEAR CARDIAC STRESS TEST 12/04/2019 12/4/2019    Narrative · Baseline ECG: Normal EKG, myocardial infarction. The infarction is   located in the inferior regions. .  · Negative stress test.  · Gated SPECT: Left ventricular function post-stress was normal.   Calculated ejection fraction is 64%. There is no evidence of transient   ischemic dilation (TID). The TID ratio is 0.88. · Left ventricular perfusion is normal.  · Negative myocardial perfusion imaging. Myocardial perfusion imaging   supports a low risk stress test.        Signed by: Sony Seay MD   10/19 cardiac event monitor  Paroxysmal atrial flutter. Mr. Lenore Soto has a reminder for a \"due or due soon\" health maintenance. I have asked that he contact his primary care provider for follow-up on this health maintenance. Physical Exam   Constitutional: He is oriented to person, place, and time. He appears well-developed and well-nourished. No distress. HENT:   Head: Normocephalic and atraumatic. Mouth/Throat: Normal dentition. Eyes: Right eye exhibits no discharge. Left eye exhibits no discharge. No scleral icterus. Neck: Neck supple. No JVD present.  Carotid bruit is not present. No thyromegaly present. Cardiovascular: Normal rate, regular rhythm, S1 normal, S2 normal, normal heart sounds and intact distal pulses. Exam reveals no gallop and no friction rub. No murmur heard. Pulmonary/Chest: Effort normal and breath sounds normal. He has no wheezes. He has no rales. Abdominal: Soft. He exhibits no mass. There is no abdominal tenderness. Musculoskeletal:         General: Edema (trace ) present. Lymphadenopathy:        Right cervical: No superficial cervical adenopathy present. Left cervical: No superficial cervical adenopathy present. Neurological: He is alert and oriented to person, place, and time. Skin: Skin is warm and dry. No rash noted. Psychiatric: He has a normal mood and affect. His behavior is normal.       ASSESSMENT and PLAN    History of paroxysmal A. Fib. Last episode 2013 approximately per patient. Mitral and aortic regurgitation: Mild 10/19    Results for Rhina RICHARDSONNicole Dorsey (MRN [id number]) as of 9/13/2019 11:15   Ref. Range 2/1/2019 08:49   Triglyceride Latest Ref Range: <150 MG/DL 63   Cholesterol, total Latest Ref Range: <200 MG/   HDL Cholesterol Latest Ref Range: 40 - 60 MG/DL 46   CHOL/HDL Ratio Latest Ref Range: 0 - 5.0   3.0   LDL, calculated Latest Ref Range: 0 - 100 MG/DL 77.4   VLDL, calculated Latest Units: MG/DL 12.6       2/2020  Continues to c/o palpitations with SOB, dizziness and flushed sensation. These occur daily and last 5-20 minutes. Will increase propafenone to 300 mg/ TID. Check EKG in 1 week. If palpitations continue, will evaluate for arrhythmias with Holter monitor. To resume lasix and compression stockings for edema. F/U with vascular as scheduled. Advised to wear CPAP consistently, this may improve SOB and palpitations. EKG - SR mild increase in QRS. Repeat EKG in 1 week. 6/2020  Reports palpitations have resolved with increased dose of prpafenone to 300 mg / TID.   EKG with  - will monitor and repeat EKG in 3 months. He has lost 23 pounds since LOV and was congratulated. Diet and exercise encouraged to promote further weight loss. Continues to have edema to RLE. He is to have venous closure to saphenous vein of right leg.    7/20 patient complains of mild dizziness which is new and had orthostatic changes in PCPs office. It is likely secondary to volume depletion and weight loss. Will discontinue diuretics and follow home BP chart. Continue other medications but if the BP stays in the low normal range, will reduce other medications gradually as well. Diagnoses and all orders for this visit:    1. Aortic ectasia, thoracic (HCC)    2. Essential hypertension  -     [facility] BP FLOWSHEET    3. Mitral and aortic regurgitation    4. Paroxysmal atrial fibrillation (HCC)    5. Obesity (BMI 30.0-34.9)    6. Orthostasis  -     [facility] BP FLOWSHEET        Pertinent laboratory and test data reviewed and discussed with patient. See patient instructions also for other medical advice given    Medications Discontinued During This Encounter   Medication Reason    furosemide (LASIX) 20 mg tablet Side Effects       Follow-up and Dispositions    · Return in about 3 months (around 10/17/2020), or if symptoms worsen or fail to improve, for with ekg.

## 2020-07-27 RX ORDER — LISINOPRIL 20 MG/1
TABLET ORAL
Qty: 90 TAB | Refills: 0 | Status: SHIPPED | OUTPATIENT
Start: 2020-07-27 | End: 2020-09-10

## 2020-07-31 ENCOUNTER — TELEPHONE (OUTPATIENT)
Dept: CARDIOLOGY CLINIC | Age: 65
End: 2020-07-31

## 2020-07-31 DIAGNOSIS — R42 DIZZINESS ON STANDING: Primary | ICD-10-CM

## 2020-07-31 NOTE — TELEPHONE ENCOUNTER
Called patient and let know per Dr. Will Vargas after looking over BP/HR readings will like to make sure writes down which positions is in when checking BP/HR and can wait around 5-10 minutes after checking sitting down to recheck while standing. Per written message from Dr. Will Vargas will be ordering Tilt table in [facility]. Patient verbalized understanding and had no further questions.

## 2020-08-04 ENCOUNTER — HOSPITAL ENCOUNTER (OUTPATIENT)
Dept: CARDIAC CATH/INVASIVE PROCEDURES | Age: 65
Discharge: HOME | End: 2020-08-04
Attending: INTERNAL MEDICINE | Admitting: INTERNAL MEDICINE
Payer: MEDICARE

## 2020-08-04 VITALS
SYSTOLIC BLOOD PRESSURE: 140 MMHG | HEIGHT: 75 IN | TEMPERATURE: 97.8 F | HEART RATE: 58 BPM | OXYGEN SATURATION: 99 % | BODY MASS INDEX: 29.84 KG/M2 | RESPIRATION RATE: 15 BRPM | DIASTOLIC BLOOD PRESSURE: 80 MMHG | WEIGHT: 240 LBS

## 2020-08-04 PROBLEM — R42 DIZZINESS: Status: ACTIVE | Noted: 2020-08-04

## 2020-08-04 PROCEDURE — 93660 TILT TABLE EVALUATION: CPT

## 2020-08-04 PROCEDURE — 74011250637 HC RX REV CODE- 250/637

## 2020-08-04 PROCEDURE — 74011250636 HC RX REV CODE- 250/636: Performed by: INTERNAL MEDICINE

## 2020-08-04 RX ORDER — SODIUM CHLORIDE 9 MG/ML
500 INJECTION, SOLUTION INTRAVENOUS ONCE
Status: COMPLETED | OUTPATIENT
Start: 2020-08-04 | End: 2020-08-04

## 2020-08-04 RX ORDER — NITROGLYCERIN 400 UG/1
1 SPRAY ORAL
Status: DISCONTINUED | OUTPATIENT
Start: 2020-08-04 | End: 2020-08-04 | Stop reason: HOSPADM

## 2020-08-04 RX ADMIN — SODIUM CHLORIDE 500 ML: 900 INJECTION, SOLUTION INTRAVENOUS at 10:30

## 2020-08-04 RX ADMIN — NITROGLYCERIN 1 SPRAY: 400 SPRAY ORAL at 11:03

## 2020-08-04 NOTE — ROUTINE PROCESS
A+Ox4, denied any complaints, ambulatory without difficulty. Discharge information reviewed. Escorted to lobby to wait for his wife to .

## 2020-08-04 NOTE — PROGRESS NOTES
A+Ox4, ambulatory without difficulty, c/o 4/10 lower back pain (chronic). Good respiratory effort, lung sounds clear. No lower extremity edema.

## 2020-08-04 NOTE — PROCEDURES
INDICATIONS: Dizziness. BASELINE DATA: The patient was brought from home as an  outpatient. Patient was taken to cath holding where IV access was  obtained and initially 500 mL of saline bolus was given, as the patient  had no shortness of breath and edema. Vital signs reveals a heart rate  of 60 bpm, blood pressure 140/86. Rhythm was normal sinus. PROCEDURE NOTE: The patient was tilted at 70 degrees according  to protocol for 30 minutes. Patient's heart rate remains between 54 and 62. There is  no significant tachycardia or bradycardia noted. No evidence of low blood  pressure during the 30 minutes. At the end of the 30 minutes, the  patient received sublingual nitroglycerin and monitored for 15 more  minutes and he remained stable without any significant changes in the heart rate  or blood pressure. Max heart rate post nitroglycerin recorded at 82 bpm and lowest BP was 119/77. CONCLUSION: Negative tilt table test including sublingual nitroglycerin  after a fluid bolus.

## 2020-08-04 NOTE — PROGRESS NOTES
Supine 20 min. Received, NS, 500 ml. Placed standing @ 70 degrees, c/o mild dizzines, passed after 1 min.

## 2020-08-04 NOTE — PROGRESS NOTES
Contacted Dr.B. Yana Falcon MD with tilt study results (NEGATIVE). No orders. Requested the patient call his office for follow up visit.

## 2020-08-04 NOTE — PROGRESS NOTES
C/O mild headache and mild dizziness. Placed supine, c/o increased dizziness while being placed supine.

## 2020-08-04 NOTE — INTERVAL H&P NOTE
Update History & Physical    The Patient's History and Physical of  August 4, 2020    Tilt table test was reviewed with the patient and I examined the patient. There was no change. The surgical site was confirmed by the patient and me. Plan:  The risk, benefits, expected outcome, and alternative to the recommended procedure have been discussed with the patient. Patient understands and wants to proceed with the procedure.     Electronically signed by Scott Arriola MD on 8/4/2020 at 5:46 PM

## 2020-08-04 NOTE — DISCHARGE INSTRUCTIONS
DISCHARGE SUMMARY from Nurse:    Resume taking your home medications as prescribed. PATIENT INSTRUCTIONS:    After general anesthesia or intravenous sedation, for 24 hours or while taking prescription Narcotics:  · Limit your activities  · Do not drive and operate hazardous machinery  · Do not make important personal or business decisions  · Do  not drink alcoholic beverages  · If you have not urinated within 8 hours after discharge, please contact your surgeon on call. Report the following to your surgeon:  · Excessive pain, swelling, redness or odor of or around the surgical area  · Temperature over 100.5  · Nausea and vomiting lasting longer than 4 hours or if unable to take medications  · Any signs of decreased circulation or nerve impairment to extremity: change in color, persistent  numbness, tingling, coldness or increase pain  · Any questions    What to do at Home:  Recommended activity: Activity as tolerated and no driving for 2 hours. *  Please give a list of your current medications to your Primary Care Provider. *  Please update this list whenever your medications are discontinued, doses are      changed, or new medications (including over-the-counter products) are added. *  Please carry medication information at all times in case of emergency situations. These are general instructions for a healthy lifestyle:    No smoking/ No tobacco products/ Avoid exposure to second hand smoke  Surgeon General's Warning:  Quitting smoking now greatly reduces serious risk to your health.     Obesity, smoking, and sedentary lifestyle greatly increases your risk for illness    A healthy diet, regular physical exercise & weight monitoring are important for maintaining a healthy lifestyle    You may be retaining fluid if you have a history of heart failure or if you experience any of the following symptoms:  Weight gain of 3 pounds or more overnight or 5 pounds in a week, increased swelling in our hands or feet or shortness of breath while lying flat in bed. Please call your doctor as soon as you notice any of these symptoms; do not wait until your next office visit. The discharge information has been reviewed with the patient. The patient verbalized understanding. Discharge medications reviewed with the patient and appropriate educational materials and side effects teaching were provided. Patient armband removed and shredded  MyChart Activation    Thank you for requesting access to [facility]. Please follow the instructions below to securely access and download your online medical record. [facility] allows you to send messages to your doctor, view your test results, renew your prescriptions, schedule appointments, and more. How Do I Sign Up? 1. In your internet browser, go to [email or web address]. 2. Click on the First Time User? Click Here link in the Sign In box. You will see the New Member Sign Up page. 3. Enter your [facility] Access Code exactly as it appears below. You will not need to use this code after youve completed the sign-up process. If you do not sign up before the expiration date, you must request a new code. [employer] Access Code: Activation code not generated  Current [facility] Status: Active (This is the date your [facility] access code will )    4. Enter the last four digits of your Social Security Number (xxxx) and Date of Birth (mm/dd/yyyy) as indicated and click Submit. You will be taken to the next sign-up page. 5. Create a [facility] ID. This will be your [facility] login ID and cannot be changed, so think of one that is secure and easy to remember. 6. Create a [facility] password. You can change your password at any time. 7. Enter your Password Reset Question and Answer. This can be used at a later time if you forget your password. 8. Enter your e-mail address. You will receive e-mail notification when new information is available in [address] 9. Click Sign Up. You can now view and download portions of your medical record. 10. Click the Download Summary menu link to download a portable copy of your medical information. Additional Information    If you have questions, please visit the Frequently Asked Questions section of the [facility] website at [email or web address] Remember, [facility] is NOT to be used for urgent needs.  For medical emergencies, dial 911.    ___________________________________________________________________________________________________________________________________

## 2020-08-11 RX ORDER — APIXABAN 5 MG/1
TABLET, FILM COATED ORAL
Qty: 60 TAB | Refills: 6 | Status: SHIPPED | OUTPATIENT
Start: 2020-08-11 | End: 2021-03-29 | Stop reason: SDUPTHER

## 2020-09-10 ENCOUNTER — OFFICE VISIT (OUTPATIENT)
Dept: CARDIOLOGY CLINIC | Age: 65
End: 2020-09-10

## 2020-09-10 VITALS
HEIGHT: 75 IN | TEMPERATURE: 97.8 F | WEIGHT: 242 LBS | DIASTOLIC BLOOD PRESSURE: 65 MMHG | BODY MASS INDEX: 30.09 KG/M2 | SYSTOLIC BLOOD PRESSURE: 118 MMHG | HEART RATE: 61 BPM

## 2020-09-10 DIAGNOSIS — I77.810 AORTIC ECTASIA, THORACIC (HCC): ICD-10-CM

## 2020-09-10 DIAGNOSIS — I08.0 MITRAL AND AORTIC REGURGITATION: ICD-10-CM

## 2020-09-10 DIAGNOSIS — I48.0 PAROXYSMAL ATRIAL FIBRILLATION (HCC): Primary | ICD-10-CM

## 2020-09-10 DIAGNOSIS — I10 ESSENTIAL HYPERTENSION: ICD-10-CM

## 2020-09-10 RX ORDER — LISINOPRIL 10 MG/1
10 TABLET ORAL DAILY
Qty: 90 TAB | Refills: 1 | Status: SHIPPED | OUTPATIENT
Start: 2020-09-10 | End: 2021-02-28

## 2020-09-10 NOTE — PATIENT INSTRUCTIONS
After the recommended changes have been made in blood pressure medicines, patient advised to keep BP/HR(pulse rate) chart twice daily and bring us results in next 1 week or so. Patient may send the results via \"My Chart\" if desired. Please rest for 5-10 minutes before checking blood pressure. Sit on a comfortable chair without crossing the legs and put your arm on a table. We recommend that you use an upper arm cuff. Check the blood pressure 3 times weekly and take the lowest reading. If you check the blood pressure in both arms, use the higher reading. High Blood Pressure: Care Instructions Overview It's normal for blood pressure to go up and down throughout the day. But if it stays up, you have high blood pressure. Another name for high blood pressure is hypertension. Despite what a lot of people think, high blood pressure usually doesn't cause headaches or make you feel dizzy or lightheaded. It usually has no symptoms. But it does increase your risk of stroke, heart attack, and other problems. You and your doctor will talk about your risks of these problems based on your blood pressure. Your doctor will give you a goal for your blood pressure. Your goal will be based on your health and your age. Lifestyle changes, such as eating healthy and being active, are always important to help lower blood pressure. You might also take medicine to reach your blood pressure goal. 
Follow-up care is a key part of your treatment and safety. Be sure to make and go to all appointments, and call your doctor if you are having problems. It's also a good idea to know your test results and keep a list of the medicines you take. How can you care for yourself at home? Medical treatment · If you stop taking your medicine, your blood pressure will go back up. You may take one or more types of medicine to lower your blood pressure. Be safe with medicines. Take your medicine exactly as prescribed.  Call your doctor if you think you are having a problem with your medicine. · Talk to your doctor before you start taking aspirin every day. Aspirin can help certain people lower their risk of a heart attack or stroke. But taking aspirin isn't right for everyone, because it can cause serious bleeding. · See your doctor regularly. You may need to see the doctor more often at first or until your blood pressure comes down. · If you are taking blood pressure medicine, talk to your doctor before you take decongestants or anti-inflammatory medicine, such as ibuprofen. Some of these medicines can raise blood pressure. · Learn how to check your blood pressure at home. Lifestyle changes · Stay at a healthy weight. This is especially important if you put on weight around the waist. Losing even 10 pounds can help you lower your blood pressure. · If your doctor recommends it, get more exercise. Walking is a good choice. Bit by bit, increase the amount you walk every day. Try for at least 30 minutes on most days of the week. You also may want to swim, bike, or do other activities. · Avoid or limit alcohol. Talk to your doctor about whether you can drink any alcohol. · Try to limit how much sodium you eat to less than 2,300 milligrams (mg) a day. Your doctor may ask you to try to eat less than 1,500 mg a day. · Eat plenty of fruits (such as bananas and oranges), vegetables, legumes, whole grains, and low-fat dairy products. · Lower the amount of saturated fat in your diet. Saturated fat is found in animal products such as milk, cheese, and meat. Limiting these foods may help you lose weight and also lower your risk for heart disease. · Do not smoke. Smoking increases your risk for heart attack and stroke. If you need help quitting, talk to your doctor about stop-smoking programs and medicines. These can increase your chances of quitting for good. When should you call for help? Call  911 anytime you think you may need emergency care. This may mean having symptoms that suggest that your blood pressure is causing a serious heart or blood vessel problem. Your blood pressure may be over 180/120. For example, call 911 if: 
  · You have symptoms of a heart attack. These may include: 
? Chest pain or pressure, or a strange feeling in the chest. 
? Sweating. ? Shortness of breath. ? Nausea or vomiting. ? Pain, pressure, or a strange feeling in the back, neck, jaw, or upper belly or in one or both shoulders or arms. ? Lightheadedness or sudden weakness. ? A fast or irregular heartbeat.  
  · You have symptoms of a stroke. These may include: 
? Sudden numbness, tingling, weakness, or loss of movement in your face, arm, or leg, especially on only one side of your body. ? Sudden vision changes. ? Sudden trouble speaking. ? Sudden confusion or trouble understanding simple statements. ? Sudden problems with walking or balance. ? A sudden, severe headache that is different from past headaches.  
  · You have severe back or belly pain. Do not wait until your blood pressure comes down on its own. Get help right away. Call your doctor now or seek immediate care if: 
  · Your blood pressure is much higher than normal (such as 180/120 or higher), but you don't have symptoms.  
  · You think high blood pressure is causing symptoms, such as: 
? Severe headache. 
? Blurry vision. Watch closely for changes in your health, and be sure to contact your doctor if: 
  · Your blood pressure measures higher than your doctor recommends at least 2 times. That means the top number is higher or the bottom number is higher, or both.  
  · You think you may be having side effects from your blood pressure medicine. Where can you learn more? Go to http://jamel-erika.info/ Enter F039 in the search box to learn more about \"High Blood Pressure: Care Instructions. \" 
 Current as of: December 16, 2019               Content Version: 12.6 © 5902-4054 [employer]. Care instructions adapted under license by [employer] (which disclaims liability or warranty for this information). If you have questions about a medical condition or this instruction, always ask your healthcare professional. [address] any warranty or liability for your use of this information.

## 2020-09-10 NOTE — PROGRESS NOTES
1. Have you been to the ER, urgent care clinic since your last visit? Hospitalized since your last visit?     no    2. Have you seen or consulted any other health care providers outside of the [address] [place] [address] since your last visit? Include any pap smears or colon screening. Yes When: x 1-2 weeks ago      3. Since your last visit, have you had any of the following symptoms?      dizziness and swelling in legs/arms. Explain:sweling in bilateral legs    4. Have you had any blood work, X-rays or cardiac testing? No    5. Where do you normally have your labs drawn? PCP,[facility]    6. Do you need any refills today?    no

## 2020-09-10 NOTE — PROGRESS NOTES
HISTORY OF PRESENT ILLNESS  Marquise Villar is a 77 y.o. male. 9/19 new patient just moved from [place]. Seen for preop clearance. Has chronic back problems and peripheral neuropathy. History of paroxysmal A. Fib. Last episode 2013 approximately per patient. Has good functional capacity as he can go up 2-3 flights of steps and can walk half to 1 mile on a level ground without stopping.  2/2020 He c/o palpitations with associated SOB and chest pain with flushed sensation. Episodes last about 20 minutes and resolve. He is anticipating right venous surgery in March.  6/2020 Denies palpitations, SOB or chest pain. Valvular Heart Disease   The history is provided by the patient and medical records (MR). Pertinent negatives include no chest pain, no headaches and no shortness of breath. Hypertension   The history is provided by the medical records. This is a chronic problem. Pertinent negatives include no chest pain, no headaches and no shortness of breath. Palpitations    The history is provided by the patient (PAF). This is a new problem. Episode onset: 5/19. The problem has been resolved (Since Rythmol started). The problem occurs rarely (2-3/month). On average, each episode lasts 5 minutes. The problem is associated with nothing. Associated symptoms include lower extremity edema and dizziness. Pertinent negatives include no diaphoresis, no fever, no malaise/fatigue, no chest pain, no claudication, no orthopnea, no PND, no nausea, no vomiting, no headaches, no cough and no shortness of breath. His past medical history is significant for hypertension. Leg Swelling   The history is provided by the patient. This is a new problem. The current episode started more than 1 week ago (3/19). The problem occurs constantly. The problem has been gradually improving. Pertinent negatives include no chest pain, no headaches and no shortness of breath. Nothing aggravates the symptoms.    Dizziness   The history is provided by the patient. This is a new problem. The current episode started more than 1 week ago (7/20). The problem occurs daily. The problem has not changed since onset. Pertinent negatives include no chest pain, no headaches and no shortness of breath. The symptoms are aggravated by standing. Review of Systems   Constitutional: Negative for chills, diaphoresis, fever, malaise/fatigue and weight loss. HENT: Negative for nosebleeds. Eyes: Negative for discharge. Respiratory: Negative for cough, shortness of breath and wheezing. Cardiovascular: Positive for palpitations and leg swelling. Negative for chest pain, orthopnea, claudication and PND. Gastrointestinal: Negative for diarrhea, nausea and vomiting. Genitourinary: Negative for dysuria and hematuria. Musculoskeletal: Negative for joint pain. Skin: Negative for rash. Neurological: Positive for dizziness. Negative for seizures, loss of consciousness and headaches. Endo/Heme/Allergies: Negative for polydipsia. Does not bruise/bleed easily. Psychiatric/Behavioral: Negative for depression and substance abuse. The patient does not have insomnia.       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    Cancer ([facility]     Depression     Endocrine disease     Hypothyroidism    Fractures 1978, 2000    ankle left, left wrist    Headache     Hearing loss 2013    Hypertension     Ill-defined condition     Pain stimulator in back    Ill-defined condition     irregular heartbeat  Nausea & vomiting     Psychiatric disorder     depression    Sleep apnea     on cpap    Thyroid disease        Family History   Problem Relation Age of Onset    Arthritis-osteo Mother     Bleeding Prob Mother     Cancer Mother     Headache Mother     Hypertension Mother     Migraines Mother     Stroke Mother 68    Arthritis-osteo Father     Stroke Father 80    Arthritis-osteo Sister     Cancer Sister     Migraines Sister     Arthritis-osteo Brother     Cancer Brother     Headache Brother     Migraines Brother     Stroke Paternal Uncle     Heart Surgery Paternal Uncle 72    Diabetes Other        Social History     Tobacco Use    Smoking status: Former Smoker     Last attempt to quit:      Years since quittin.7    Smokeless tobacco: Never Used   Substance Use Topics    Alcohol use: No     Frequency: Never    Drug use: No        Current Outpatient Medications   Medication Sig    Eliquis 5 mg tablet Take 1 tablet by mouth twice daily    lisinopriL (PRINIVIL, ZESTRIL) 20 mg tablet Take 1 tablet by mouth once daily    tiZANidine (ZANAFLEX) 4 mg capsule Take 4 mg by mouth three (3) times daily.  propafenone (RYTHMOL) 300 mg tablet Take 1 Tab by mouth three (3) times daily.  escitalopram oxalate (LEXAPRO) 20 mg tablet Take 20 mg by mouth daily. Take 1/2 (one-half) tablet by mouth once daily in the morning for 6 days then 1 once daily in the morning    busPIRone (BUSPAR) 10 mg tablet TAKE 1 TABLET BY MOUTH THREE TIMES DAILY (Patient taking differently: Take 10 mg by mouth four (4) times daily as needed (anxiety). )    buPROPion SR (WELLBUTRIN SR) 150 mg SR tablet TAKE 1 TABLET BY MOUTH ONCE DAILY    linaCLOtide (LINZESS) 72 mcg cap capsule Take 1 Cap by mouth daily.  testosterone cypionate (DEPOTESTOTERONE CYPIONATE) 200 mg/mL injection 0.5 mL by IntraMUSCular route every seven (7) days. Max Daily Amount: 100 mg.  (Patient taking differently: 100 mg by IntraMUSCular route Twice a month.)    esomeprazole (NEXIUM) 40 mg capsule Take 1 Cap by mouth daily.  cholecalciferol (VITAMIN D3) 1,000 unit tablet Take  by mouth daily.  HYDROcodone-acetaminophen (NORCO)  mg tablet Take 1 Tab by mouth every twelve (12) hours as needed for Pain. Max Daily Amount: 2 Tabs.  amLODIPine (NORVASC) 5 mg tablet Take 1 Tab by mouth daily.  levothyroxine (SYNTHROID) 100 mcg tablet Take 1 Tab by mouth Daily (before breakfast). No current facility-administered medications for this visit. Past Surgical History:   Procedure Laterality Date    HX BACK SURGERY  10/10/2019    Revision SCS    HX COLONOSCOPY      HX HEENT  2003    Reconstructive surgery on face    HX KNEE REPLACEMENT  2015/2016    bilat knees    HX ORTHOPAEDIC  [id number]    back surgery X4       Visit Vitals  /65 (BP 1 Location: Left arm, BP Patient Position: Sitting)   Pulse 61   Temp 97.8 °F (36.6 °C) (Temporal)   Ht 6' 3\" (1.905 m)   Wt 109.8 kg (242 lb)   BMI 30.25 kg/m²       Diagnostic Studies:  I have reviewed the relevant tests done on the patient and show as follows  EKG tracings reviewed by me today. EKG Results     Procedure 720 Value Units Date/Time    AMB POC EKG ROUTINE W/ 12 LEADS, INTER & REP [[id number]] Resulted:  09/10/20 1315    Order Status:  Completed Updated:  09/10/20 1316        XR Results (most recent):  Results from [place] encounter on 06/27/19   [facility] XR TECHNOLOGIST SERVICE    Narrative Fluoroscopy was provided for a bundled exam for documentation purposes. Impression IMPRESSION:    Please see above. FLUORO TIME: 00.08    AJB         11/04/19   ECHO ADULT COMPLETE 11/04/2019 11/4/2019    Narrative · Left Ventricle: Normal cavity size and systolic function (ejection   fraction normal). Mildly to moderately increased wall thickness. Estimated   left ventricular ejection fraction is 56 - 60%. Moderate (grade 2) left   ventricular diastolic dysfunction.   · Right Ventricle: Normal right ventricular size and function. · Right Atrium: Mildly dilated right atrium. · Mitral Valve: Mitral valve thickening. Mild mitral valve regurgitation   is present. · Aortic Valve: Mild aortic valve regurgitation is present. · Tricuspid Valve: Mild tricuspid valve regurgitation is present. · Pulmonary Artery: There is no evidence of pulmonary hypertension. · Aorta: Mildly dilated aortic root; diameter is 3.9 cm. Mildly dilated   ascending aorta; diameter is 4 cm. Signed by: Rodolfo Oropeza MD     12/04/19   NUCLEAR CARDIAC STRESS TEST 12/04/2019 12/4/2019    Narrative · Baseline ECG: Normal EKG, myocardial infarction. The infarction is   located in the inferior regions. .  · Negative stress test.  · Gated SPECT: Left ventricular function post-stress was normal.   Calculated ejection fraction is 64%. There is no evidence of transient   ischemic dilation (TID). The TID ratio is 0.88. · Left ventricular perfusion is normal.  · Negative myocardial perfusion imaging. Myocardial perfusion imaging   supports a low risk stress test.        Signed by: Claudetta Passe, MD   10/19 cardiac event monitor  Paroxysmal atrial flutter. 7/20 carotid duplex  Interpretation Summary  No significant obstructive lesions noted in the right extracranial carotid  system. < 50% left internal carotid artery stenosis. Antegrade flow noted in the vertebral arteries. Normal flow noted in the subclavian arteries. 8/20 Tilt Table  CONCLUSION: Negative tilt table test including sublingual nitroglycerin  after a fluid bolus. Mr. Satnos Beltran has a reminder for a \"due or due soon\" health maintenance. I have asked that he contact his primary care provider for follow-up on this health maintenance. Physical Exam   Constitutional: He is oriented to person, place, and time. He appears well-developed and well-nourished. No distress. HENT:   Head: Normocephalic and atraumatic. Mouth/Throat: Normal dentition. Eyes: Right eye exhibits no discharge. Left eye exhibits no discharge. No scleral icterus. Neck: Neck supple. No JVD present. Carotid bruit is not present. No thyromegaly present. Cardiovascular: Normal rate, regular rhythm, S1 normal, S2 normal, normal heart sounds and intact distal pulses. Exam reveals no gallop and no friction rub. No murmur heard. Pulmonary/Chest: Effort normal and breath sounds normal. He has no wheezes. He has no rales. Abdominal: Soft. He exhibits no mass. There is no abdominal tenderness. Musculoskeletal:         General: Edema (trace ) present. Lymphadenopathy:        Right cervical: No superficial cervical adenopathy present. Left cervical: No superficial cervical adenopathy present. Neurological: He is alert and oriented to person, place, and time. Skin: Skin is warm and dry. No rash noted. Psychiatric: He has a normal mood and affect. His behavior is normal.       ASSESSMENT and PLAN    History of paroxysmal A. Fib. Last episode 2013 approximately per patient. Mitral and aortic regurgitation: Mild 10/19  Aortic ectasia: 11/19 aortic root 3.9, ASC ao 4.0;    Results for GABY RICHARDSON JR. Aydee Aguero (MRN [id number]) as of 9/13/2019 11:15   Ref. Range 2/1/2019 08:49   Triglyceride Latest Ref Range: <150 MG/DL 63   Cholesterol, total Latest Ref Range: <200 MG/   HDL Cholesterol Latest Ref Range: 40 - 60 MG/DL 46   CHOL/HDL Ratio Latest Ref Range: 0 - 5.0   3.0   LDL, calculated Latest Ref Range: 0 - 100 MG/DL 77.4   VLDL, calculated Latest Units: MG/DL 12.6       2/2020  Continues to c/o palpitations with SOB, dizziness and flushed sensation. These occur daily and last 5-20 minutes. Will increase propafenone to 300 mg/ TID. Check EKG in 1 week. If palpitations continue, will evaluate for arrhythmias with Holter monitor. To resume lasix and compression stockings for edema. F/U with vascular as scheduled.   Advised to wear CPAP consistently, this may improve SOB and palpitations. EKG - SR mild increase in QRS. Repeat EKG in 1 week. 6/2020  Reports palpitations have resolved with increased dose of prpafenone to 300 mg / TID. EKG with  - will monitor and repeat EKG in 3 months. He has lost 23 pounds since LOV and was congratulated. Diet and exercise encouraged to promote further weight loss. Continues to have edema to RLE. He is to have venous closure to saphenous vein of right leg.    7/20 patient complains of mild dizziness which is new and had orthostatic changes in PCPs office. It is likely secondary to volume depletion and weight loss. Will discontinue diuretics and follow home BP chart. Continue other medications but if the BP stays in the low normal range, will reduce other medications gradually as well. 9/20 tilt table test was negative. Blood pressure is very good and patient is losing some weight. Reduce lisinopril and follow the home chart. As patient says that he will lose more weight. Will adjust medications as needed in future. Diagnoses and all orders for this visit:    1. Paroxysmal atrial fibrillation (HCC)  -     AMB POC EKG ROUTINE W/ 12 LEADS, INTER & REP    2. Essential hypertension  -     lisinopriL (PRINIVIL, ZESTRIL) 10 mg tablet; Take 1 Tab by mouth daily. 3. Aortic ectasia, thoracic (HCC)    4. Mitral and aortic regurgitation        Pertinent laboratory and test data reviewed and discussed with patient. See patient instructions also for other medical advice given    Medications Discontinued During This Encounter   Medication Reason    amoxicillin (AMOXIL) 500 mg capsule Therapy Completed    LORazepam (ATIVAN) 1 mg tablet        Follow-up and Dispositions    · Return in about 6 months (around 3/10/2021), or if symptoms worsen or fail to improve, for with ekg.

## 2020-09-18 DIAGNOSIS — I87.2 VENOUS (PERIPHERAL) INSUFFICIENCY: Primary | ICD-10-CM

## 2020-09-18 RX ORDER — NALOXONE HYDROCHLORIDE 4 MG/.1ML
SPRAY NASAL
Qty: 1 EACH | Refills: 0 | Status: SHIPPED | OUTPATIENT
Start: 2020-09-18

## 2020-09-18 RX ORDER — LORAZEPAM 1 MG/1
TABLET ORAL
Qty: 3 TAB | Refills: 0
Start: 2020-09-18 | End: 2020-10-19 | Stop reason: ALTCHOICE

## 2020-09-18 NOTE — TELEPHONE ENCOUNTER
Prescription for Ativan 1 mg tablet sent to [facility] [place] [facility] for upcoming procedure on Monday. Quantity 3 with no refills. Per verbal order Dr. Talib Baron. Patient notified of prescription.

## 2020-09-21 ENCOUNTER — CLINICAL SUPPORT (OUTPATIENT)
Dept: VASCULAR SURGERY | Age: 65
End: 2020-09-21

## 2020-09-21 VITALS
OXYGEN SATURATION: 99 % | HEART RATE: 78 BPM | DIASTOLIC BLOOD PRESSURE: 70 MMHG | BODY MASS INDEX: 30.09 KG/M2 | RESPIRATION RATE: 18 BRPM | HEIGHT: 75 IN | SYSTOLIC BLOOD PRESSURE: 138 MMHG | WEIGHT: 242 LBS

## 2020-09-21 DIAGNOSIS — I87.2 VENOUS (PERIPHERAL) INSUFFICIENCY: Primary | ICD-10-CM

## 2020-09-21 NOTE — PROGRESS NOTES
[facility]    Yuliana Lyles.    Pre-Operative Diagnosis: Symptomatic superficial venous incompetence with reflux    Post-Operative Diagnosis: Symptomatic superficial venous incompetence with reflux Procedure: Endovenous ablation right lessersaphenous vein  Surgeon: Natalia Orozco MD   Anesthesia: Local  Estimated Blood Loss: Minimal  Complications: None    Pre pain assessment: 3 out of 10. Post pain assessment: 3 out of 10. The patient was placed on the procedure table. The right leg was prepped and draped in the usual sterile fashion. The skin was anesthetized with 1% Xylocaine. Ultrasound guided access was obtained at the below the knee level into the right lesser  saphenous vein after the vein had been mapped with duplex ultrasound. The micro puncture wire was advanced and sheath placed over the wire into the saphenous vein. The RFA Closure catheter was then advanced under direct visualization to the saphenopopliteal junction. The probes of the catheter were opened and the catheter was withdrawn to approximately 2cm distal to the saphenopopliteal junction. Continuous irrigation through the catheter was done to prevent clotting. After adequate positioning of the catheter had been verified, the leg was infiltrated with Tumescent anesthesia using Lidocaine with epinephrine. It was then proceeded with radio frequency ablation of the saphenous vein starting at the saphenopopliteal level. The patient was placed in Trendelenburg. Pressure was supplied to the vein and the temperature was maintained around 120 degrees. The impedance was within range. The vein was treated down to the level of the mid calf with excellent results by ultrasound as demonstrated by thickening of the vein wall and no flow. Treatment time was 2 minutes. The catheter was then pulled into the sheath. The sheath was pulled out of the vein and pressure was applied.   The patient was observed for 10 minutes and appeared to be in stable condition. A thigh high compression stocking was applied. Vital signs remained stable throughput the procedure. The patient was then discharged in stable condition and follow-up was arranged.     Hiral Cruz MD

## 2020-09-21 NOTE — PROGRESS NOTES
[facility]          Chart reviewed for the following:   Olinda KEY RN, have reviewed the medications and updated the Allergic reactions for [address] performed immediately prior to start of procedure:   Rachna Porter RN, have performed the following reviews on [address] prior to the start of the procedure:            * Patient was identified by name and date of birth   * Agreement that consent was signed and dated  * Procedure site verified   * Patient was positioned for comfort  * Verbal consent was given by patient  * Patients pain level assessment completed     Time: 0900      Date of procedure: 9/21/2020    Procedure performed by:  Amadou Gaona MD    Specimen sent to lab: no    How tolerated by patient: tolerated well     Discharge instructions reviewed and given to patient: yes     Comments: Pt ambulated to the room, consent signed, questions answered , pt given 1 mg ativan po prior to assisting with changing and placing pt on table in position for procedure. Pt assisted to the prone position with pillow placed underneath lower legs , pt tolerated the procedure well with no issues, once procedure completed, pressure held at insertion and pressure dressing applied. Pt placed in knee high stocking, assisted with dressing and ambulated to the waiting room without issue. Advised to call if pt has any questions post procedure.

## 2020-09-28 ENCOUNTER — OFFICE VISIT (OUTPATIENT)
Dept: VASCULAR SURGERY | Age: 65
End: 2020-09-28
Payer: MEDICARE

## 2020-09-28 VITALS
OXYGEN SATURATION: 98 % | RESPIRATION RATE: 20 BRPM | SYSTOLIC BLOOD PRESSURE: 138 MMHG | HEART RATE: 57 BPM | DIASTOLIC BLOOD PRESSURE: 88 MMHG

## 2020-09-28 DIAGNOSIS — I87.2 VENOUS (PERIPHERAL) INSUFFICIENCY: Primary | ICD-10-CM

## 2020-09-28 PROCEDURE — G8417 CALC BMI ABV UP PARAM F/U: HCPCS | Performed by: SURGERY

## 2020-09-28 PROCEDURE — 1101F PT FALLS ASSESS-DOCD LE1/YR: CPT | Performed by: SURGERY

## 2020-09-28 PROCEDURE — G8536 NO DOC ELDER MAL SCRN: HCPCS | Performed by: SURGERY

## 2020-09-28 PROCEDURE — 3017F COLORECTAL CA SCREEN DOC REV: CPT | Performed by: SURGERY

## 2020-09-28 PROCEDURE — G8752 SYS BP LESS 140: HCPCS | Performed by: SURGERY

## 2020-09-28 PROCEDURE — G8754 DIAS BP LESS 90: HCPCS | Performed by: SURGERY

## 2020-09-28 PROCEDURE — G9717 DOC PT DX DEP/BP F/U NT REQ: HCPCS | Performed by: SURGERY

## 2020-09-28 PROCEDURE — G8427 DOCREV CUR MEDS BY ELIG CLIN: HCPCS | Performed by: SURGERY

## 2020-09-28 PROCEDURE — 99212 OFFICE O/P EST SF 10 MIN: CPT | Performed by: SURGERY

## 2020-09-28 NOTE — PROGRESS NOTES
1. Have you been to the ER, urgent care clinic since your last visit? Hospitalized since your last visit? No    2. Have you seen or consulted any other health care providers outside of the [address] [place] [address] since your last visit? Include any pap smears or colon screening.  No         3 most recent PHQ Screens 10/22/2019   PHQ Not Done Active Diagnosis of Depression or Bipolar Disorder   Little interest or pleasure in doing things Not at all   Feeling down, depressed, irritable, or hopeless -   Total Score PHQ 2 -   Trouble falling or staying asleep, or sleeping too much -   Feeling tired or having little energy -   Poor appetite, weight loss, or overeating -   Feeling bad about yourself - or that you are a failure or have let yourself or your family down -   Trouble concentrating on things such as school, work, reading, or watching TV -   Moving or speaking so slowly that other people could have noticed; or the opposite being so fidgety that others notice -   Thoughts of being better off dead, or hurting yourself in some way -   PHQ 9 Score -   How difficult have these problems made it for you to do your work, take care of your home and get along with others -

## 2020-09-28 NOTE — PROGRESS NOTES
[address] Chief Complaint   Patient presents with    Varicose Veins       History and Physical    Mr. Baldo Rodriguez returns to the office for continued follow-up management after undergoing a right short saphenous vein closure. He states that since the procedure he has been doing well has no new complaints. He states that he is noticed improvement in his leg and foot swelling on the right. He does state that sometimes when he puts his foot down the ground for a while and lifts up he does feel some pulling the back of his leg but states that this is very minor. He specifically denies any fevers or chills or ulcerations. 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 9) with comorbidity ([address] E66.01    Failed orthopedic implant ([address] T84.498A    Depression, major, recurrent, mild ([address] F33.0    Battery end of life of spinal cord stimulator Z45.42    Paroxysmal atrial fibrillation (HCC) I48.0    Essential hypertension I10    Mitral and aortic regurgitation I08.0    Chest pain R07.9    Aortic ectasia, thoracic (HCC) I77.810    Obesity (BMI 30.0-34. 9) E66.9    Orthostasis I95.1    Dizziness R42     Past Surgical History:   Procedure Laterality Date    HX BACK SURGERY  10/10/2019    Revision SCS    HX COLONOSCOPY      HX HEENT  2003    Reconstructive surgery on face    HX KNEE REPLACEMENT  2015/2016 bilat knees    HX ORTHOPAEDIC  [id number]    back surgery X4     Current Outpatient Medications   Medication Sig Dispense Refill    naloxone (NARCAN) 4 mg/actuation nasal spray Use 1 spray intranasally, then discard. Repeat with new spray every 2 min as needed for opioid overdose symptoms, alternating nostrils. 1 Each 0    lisinopriL (PRINIVIL, ZESTRIL) 10 mg tablet Take 1 Tab by mouth daily. 90 Tab 1    Eliquis 5 mg tablet Take 1 tablet by mouth twice daily 60 Tab 6    tiZANidine (ZANAFLEX) 4 mg capsule Take 4 mg by mouth three (3) times daily.  propafenone (RYTHMOL) 300 mg tablet Take 1 Tab by mouth three (3) times daily. 270 Tab 3    escitalopram oxalate (LEXAPRO) 20 mg tablet Take 20 mg by mouth daily. Take 1/2 (one-half) tablet by mouth once daily in the morning for 6 days then 1 once daily in the morning      busPIRone (BUSPAR) 10 mg tablet TAKE 1 TABLET BY MOUTH THREE TIMES DAILY (Patient taking differently: Take 10 mg by mouth four (4) times daily as needed (anxiety). ) 90 Tab 0    buPROPion SR (WELLBUTRIN SR) 150 mg SR tablet TAKE 1 TABLET BY MOUTH ONCE DAILY 90 Tab 0    linaCLOtide (LINZESS) 72 mcg cap capsule Take 1 Cap by mouth daily. 90 Cap 3    testosterone cypionate (DEPOTESTOTERONE CYPIONATE) 200 mg/mL injection 0.5 mL by IntraMUSCular route every seven (7) days. Max Daily Amount: 100 mg. (Patient taking differently: 100 mg by IntraMUSCular route Twice a month.) 10 mL 3    esomeprazole (NEXIUM) 40 mg capsule Take 1 Cap by mouth daily. 180 Cap 3    cholecalciferol (VITAMIN D3) 1,000 unit tablet Take  by mouth daily.  HYDROcodone-acetaminophen (NORCO)  mg tablet Take 1 Tab by mouth every twelve (12) hours as needed for Pain. Max Daily Amount: 2 Tabs. 60 Tab 0    amLODIPine (NORVASC) 5 mg tablet Take 1 Tab by mouth daily. 90 Tab 3    levothyroxine (SYNTHROID) 100 mcg tablet Take 1 Tab by mouth Daily (before breakfast).  90 Tab 3    LORazepam (ATIVAN) 1 mg tablet Bring to procedure and take as directed.  3 Tab 0     Allergies   Allergen Reactions    Latex Itching    Codeine Rash    Codeine Hives    Doxycycline Other (comments)     Dark urine    Doxycycline Itching     Urine turned black    Topamax [Topiramate] Itching     Rash, itching    Topamax [Topiramate] Hives    Verapamil Other (comments)     tachycardia    Verapamil Palpitations     Social History     Socioeconomic History    Marital status:      Spouse name: Not on file    Number of children: Not on file    Years of education: Not on file    Highest education level: Not on file   Occupational History    Not on file   Social Needs    Financial resource strain: Not on file    Food insecurity     Worry: Not on file     Inability: Not on file    Transportation needs     Medical: Not on file     Non-medical: Not on file   Tobacco Use    Smoking status: Former Smoker     Last attempt to quit:      Years since quittin.7    Smokeless tobacco: Never Used   Substance and Sexual Activity    Alcohol use: No     Frequency: Never    Drug use: No    Sexual activity: Never     Partners: Female   Lifestyle    Physical activity     Days per week: Not on file     Minutes per session: Not on file    Stress: Not on file   Relationships    Social connections     Talks on phone: Not on file     Gets together: Not on file     Attends [employer] service: Not on file     Active member of club or organization: Not on file     Attends meetings of clubs or organizations: Not on file     Relationship status: Not on file    Intimate partner violence     Fear of current or ex partner: Not on file     Emotionally abused: Not on file     Physically abused: Not on file     Forced sexual activity: Not on file   Other Topics Concern     Service Not Asked    Blood Transfusions Not Asked    Caffeine Concern Not Asked    Occupational Exposure Not Asked   [place] Hazards Not Asked    Sleep Concern Not Asked    Stress Concern Not Asked    Weight Concern Not Asked    Special Diet Not Asked    Back Care Not Asked    Exercise Not Asked    Bike Helmet Not Asked   [address] Not Asked    Self-Exams Not Asked   Social History Narrative    ** Merged History Encounter **           Family History   Problem Relation Age of Onset    Arthritis-osteo Mother     Bleeding Prob Mother     Cancer Mother     Headache Mother     Hypertension Mother     Migraines Mother     Stroke Mother 68    [address] Father     Stroke Father 80    Arthritis-osteo Sister     Cancer Sister     Migraines Sister     Arthritis-osteo Brother     Cancer Brother     Headache Brother     Migraines Brother     Stroke Paternal Uncle     Heart Surgery Paternal Uncle 72    Diabetes Other        Review of Systems    Review of Systems   Constitutional: Negative for chills, diaphoresis, fever, malaise/fatigue and weight loss. HENT: Negative for hearing loss and sore throat. Eyes: Negative for blurred vision, photophobia and redness. Respiratory: Negative for cough, hemoptysis, shortness of breath and wheezing. Cardiovascular: Negative for chest pain, palpitations and orthopnea. Gastrointestinal: Negative for abdominal pain, blood in stool, constipation, diarrhea, heartburn, nausea and vomiting. Genitourinary: Negative for dysuria, frequency, hematuria and urgency. Musculoskeletal: Negative for back pain and myalgias. Skin: Negative for itching and rash. Neurological: Negative for dizziness, speech change, focal weakness, weakness and headaches. Endo/Heme/Allergies: Does not bruise/bleed easily. Psychiatric/Behavioral: Negative for depression and suicidal ideas.             Physical Exam:    Visit Vitals  /88 (BP 1 Location: Right arm, BP Patient Position: Sitting)   Pulse (!) 57   Resp 20   SpO2 98%      Physical Examination: General appearance - alert, well appearing, and in no distress  Mental status - alert, oriented to person, place, and time  Eyes - sclera anicteric, left eye normal, right eye normal  Ears - right ear normal, left ear normal  Nose - normal and patent, no erythema, discharge or polyps  Mouth - mucous membranes moist, pharynx normal without lesions  Extremities -right lower extremity warm well perfused palpable pedal pulses. No significant edema in the right lower extremity. Puncture site in the back of the right calf clean dry intact with no sign of infection. Palpable short saphenous vein with no surrounding erythema. Impression and Plan:    ICD-10-CM ICD-9-CM    1. Venous (peripheral) insufficiency  I87.2 459.81      No orders of the defined types were placed in this encounter. Follow-up and Dispositions    · Return in about 3 months (around 12/28/2020) for Symptom check. I reviewed Mr. Kaushal Guadarrama is post procedure ultrasound which showed successful mechanical thrombosis of the right short saphenous vein without any propagation of the thrombus into the deep venous system. I am pleased with her results. I explained to the discomfort he feels back calf most likely due to the inflammation surrounding the thrombosed short saphenous vein. I told him to continue to wear his compression stockings the discomfort will subside. I will see him back in 3 months time for a symptom check. The treatment plan was reviewed with the patient in detail. The patient voiced understanding of this plan and all questions and concerns were addressed. The patient agrees with this plan. We discussed the signs and symptoms that would require earlier attention or intervention. The patient was given educational material related to his/her visit and the patient has voiced understanding of the material.     I appreciate the opportunity to participate in the care of your patient. I will be sure to keep you informed of any subsequent changes in the treatment plan.   If you have any questions or concerns, please feel free to contact me. Sera Mcconnell MD    PLEASE NOTE:  This document has been produced using voice recognition software. Unrecognized errors in transcription may be present.

## 2020-10-05 ENCOUNTER — OFFICE VISIT (OUTPATIENT)
Dept: ORTHOPEDIC SURGERY | Age: 65
End: 2020-10-05
Payer: MEDICARE

## 2020-10-05 VITALS
HEIGHT: 75 IN | RESPIRATION RATE: 15 BRPM | OXYGEN SATURATION: 98 % | HEART RATE: 54 BPM | DIASTOLIC BLOOD PRESSURE: 79 MMHG | TEMPERATURE: 97.5 F | SYSTOLIC BLOOD PRESSURE: 127 MMHG | WEIGHT: 239 LBS | BODY MASS INDEX: 29.72 KG/M2

## 2020-10-05 DIAGNOSIS — M48.061 SPINAL STENOSIS OF LUMBAR REGION WITHOUT NEUROGENIC CLAUDICATION: Primary | ICD-10-CM

## 2020-10-05 DIAGNOSIS — K59.03 DRUG-INDUCED CONSTIPATION: ICD-10-CM

## 2020-10-05 DIAGNOSIS — Z96.89 S/P INSERTION OF SPINAL CORD STIMULATOR: ICD-10-CM

## 2020-10-05 PROCEDURE — G8427 DOCREV CUR MEDS BY ELIG CLIN: HCPCS | Performed by: NURSE PRACTITIONER

## 2020-10-05 PROCEDURE — G9717 DOC PT DX DEP/BP F/U NT REQ: HCPCS | Performed by: NURSE PRACTITIONER

## 2020-10-05 PROCEDURE — G8752 SYS BP LESS 140: HCPCS | Performed by: NURSE PRACTITIONER

## 2020-10-05 PROCEDURE — G8754 DIAS BP LESS 90: HCPCS | Performed by: NURSE PRACTITIONER

## 2020-10-05 PROCEDURE — G8536 NO DOC ELDER MAL SCRN: HCPCS | Performed by: NURSE PRACTITIONER

## 2020-10-05 PROCEDURE — 1101F PT FALLS ASSESS-DOCD LE1/YR: CPT | Performed by: NURSE PRACTITIONER

## 2020-10-05 PROCEDURE — 99213 OFFICE O/P EST LOW 20 MIN: CPT | Performed by: NURSE PRACTITIONER

## 2020-10-05 PROCEDURE — G8417 CALC BMI ABV UP PARAM F/U: HCPCS | Performed by: NURSE PRACTITIONER

## 2020-10-05 PROCEDURE — 3017F COLORECTAL CA SCREEN DOC REV: CPT | Performed by: NURSE PRACTITIONER

## 2020-10-05 RX ORDER — METHYLPREDNISOLONE 4 MG/1
TABLET ORAL
Qty: 1 DOSE PACK | Refills: 0 | Status: SHIPPED | OUTPATIENT
Start: 2020-10-05 | End: 2020-10-19 | Stop reason: ALTCHOICE

## 2020-10-05 RX ORDER — DICLOFENAC SODIUM 10 MG/G
2 GEL TOPICAL
Qty: 100 G | Refills: 1 | Status: SHIPPED | OUTPATIENT
Start: 2020-10-05 | End: 2022-04-15 | Stop reason: CLARIF

## 2020-10-05 RX ORDER — GABAPENTIN 300 MG/1
300 CAPSULE ORAL 3 TIMES DAILY
COMMUNITY
Start: 2020-09-16

## 2020-10-05 NOTE — PROGRESS NOTES
Satnam Bell. presents today for   Chief Complaint   Patient presents with    Back Pain       Is someone accompanying this pt? Yes, female    Is the patient using any DME equipment during [address]? no    Depression Screening:  3 most recent PHQ Screens 10/22/2019   PHQ Not Done Active Diagnosis of Depression or Bipolar Disorder   Little interest or pleasure in doing things Not at all   Feeling down, depressed, irritable, or hopeless -   Total Score PHQ 2 -   Trouble falling or staying asleep, or sleeping too much -   Feeling tired or having little energy -   Poor appetite, weight loss, or overeating -   Feeling bad about yourself - or that you are a failure or have let yourself or your family down -   Trouble concentrating on things such as school, work, reading, or watching TV -   Moving or speaking so slowly that other people could have noticed; or the opposite being so fidgety that others notice -   Thoughts of being better off dead, or hurting yourself in some way -   PHQ 9 Score -   How difficult have these problems made it for you to do your work, take care of your home and get along with others -       Learning Assessment:  Learning Assessment 11/25/2019   PRIMARY LEARNER Patient   PRIMARY LANGUAGE ENGLISH   LEARNER PREFERENCE PRIMARY LISTENING   ANSWERED BY patient   RELATIONSHIP SELF       Abuse Screening:  Abuse Screening Questionnaire 10/22/2019   Do you ever feel afraid of your partner? N   Are you in a relationship with someone who physically or mentally threatens you? N   Is it safe for you to go home? Y       Fall Risk  Fall Risk Assessment, last 12 mths 9/28/2020   Able to walk? Yes   Fall in past 12 months? No   Fall with injury? -   Number of falls in past 12 months -   Fall Risk Score -         Coordination of Care:  1. Have you been to the ER, urgent care clinic since your last visit? no  Hospitalized since your last visit? no    2.  Have you seen or consulted any other health care providers outside of the [address] [place] [address] since your last visit? Yes, pain management and vascular. Include any pap smears or colon screening.  no

## 2020-10-05 NOTE — PROGRESS NOTES
[address]  [address], [address]  [facility], [address]  Phone: [phone]  Fax: [phone]  PROGRESS NOTE  Patient: Tg Rowan MRN: [id number]       SSN: [id number]  YOB: 1955        AGE: 77 y.o. SEX: male  Body mass index is 29.87 kg/m². PCP: Lindsey Cm MD  10/05/20    Chief Complaint   Patient presents with    Back Pain       HISTORY OF PRESENT ILLNESS:  Tg Rowan is a 77 y.o.  male with history of chronic back and leg pain. Prior history of back problems: previous spinal surgery - revision SCS 10/2019 by Dr. Jenelle Mahan. He has an L2-5 fusion in 2008 by Dr. Aponte Amend had 2 surgeries by him. Last set of x-rays 9/29/2020 stable. 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 At last OV he saw Leonardo Hammond NP a year ago. Today, he comes in w/ 3 days of a flare of low back pain. He denies any radicular leg pain. He is pretty miserable and has been constipated. His back hurst when he walks or bends. His pain is aching, stabbing and throbbing, pain is worse with walking, lifting, twisting and affects recreational activities. Pain is better with nothing. Denies bladder/bowel dysfunction, saddle paresthesia, weakness, gait disturbance, or other neurological deficits. Medications: Norco 10/325mg BID by Dr. Liss Romo. Gabapentin 300mg BID for headaches. Tizanidine 4mg PRN. On Eliquis. PMHx: Cardiac arrhythmia- Dr. Mendez Ee       ASSESSMENT   Diagnoses and all orders for this visit:    1. Spinal stenosis of lumbar region without neurogenic claudication    2. S/P insertion of spinal cord stimulator    3. Drug-induced constipation    Other orders  -     linaCLOtide (Linzess) 145 mcg cap capsule; Take 1 Cap by mouth Daily (before breakfast). -     methylPREDNISolone (Medrol, Dino,) 4 mg tablet; Per dose pack instructions  -     diclofenac (VOLTAREN) 1 % gel;  Apply 2 g to affected area three (3) times daily as needed for Pain. IMPRESSION AND PLAN:  This is a pt with a long hx of arachnoiditis. He has been having 3 days of increased back pain. He is miserable w/ some constipation. > Pt was given information on back pain   > Increase Linzess for constipation, may add Miralax  > MDP and Voltaren gel  > Mr. Amberly Villeda has a reminder for a \"due or due soon\" health maintenance. I have asked that he contact his primary care provider, Harlan Summers MD, for follow-up on this health maintenance.  > We have informed patient to notify us for immediate appointment if he has any worsening neurogical symptoms or if an emergency situation presents, then call 911  >  has been reviewed and is appropriate  > Pt will follow-up in 2 wks. Subjective    Pain Scale: 8/10    Pain Assessment  10/5/2020   Location of Pain Back   Location Modifiers -   Severity of Pain 8   Quality of Pain Sharp; Aching;Burning   Quality of Pain Comment -   Duration of Pain Persistent   Duration of Pain Comment -   Frequency of Pain Constant   Aggravating Factors Other (Comment); Walking   Aggravating Factors Comment lifting   Limiting Behavior Yes   Relieving Factors Heat   Relieving Factors Comment -   Result of Injury Yes   Work-Related Injury No   Type of Injury -   Type of Injury Comment -         REVIEW OF SYSTEMS  Constitutional: Negative for fever, chills, or weight change. Respiratory: Negative for cough or shortness of breath. Cardiovascular: Negative for chest pain or palpitations. Gastrointestinal: Negative for incontinence, acid reflux, change in bowel habits, or constipation. Genitourinary: Negative for incontinence, dysuria and flank pain. Musculoskeletal: Positive for back pain. See HPI. Skin: Negative for rash. Neurological:no  radiculopathy. See HPI. Endo/Heme/Allergies: Negative. Psychiatric/Behavioral: Negative.       PHYSICAL EXAMINATION  Visit Vitals  /79 (BP 1 Location: Right arm, BP Patient Position: Sitting)   Pulse (!) 54 Comment: pt asymptomatic, NP aware, pt states normal   Temp 97.5 °F (36.4 °C) (Skin)   Resp 15   Ht 6' 3\" (1.905 m)   Wt 239 lb (108.4 kg)   SpO2 98% Comment: RA   BMI 29.87 kg/m²         Accompanied by self. Constitutional:  Well developed, well nourished, in no acute distress. Psychiatric: Affect and mood are appropriate. Integumentary: No rashes or abrasions noted on exposed areas. Cardiovascular/Peripheral Vascular: +2 radial & pedal pulses. No peripheral edema is noted. Lymphatic:  No evidence of lymphedema. No cervical lymphadenopathy. SPINE/MUSCULOSKELETAL EXAM     Lumbar spine:  No rash, ecchymosis, or gross obliquity. No fasciculations. No focal atrophy is noted. Range of motion is decreased and pain with flexion, extension, turning right, turning left. Tenderness to palpation bilateral low back L4-5. No tenderness to palpation at the sciatic notch. SI joints non-tender. Trochanters non tender. Straight leg raise Neg  Hip Impingement neg    Sensation grossly intact to light touch. MOTOR:     Hip Flex Quads Hamstrings Ankle DF EHL Ankle PF   Right 5/5 5/5 5/5 5/5 5/5 5/5   Left 5/5 5/5 5/5 5/5 5/5 5/5        Ambulation with single point cane.  FWB.    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    Cancer ([address]     Depression     Endocrine disease     Hypothyroidism    Fractures 1978, 2000    ankle left, left wrist    Headache     Hearing loss 2013    Hypertension     Ill-defined condition     Pain stimulator in back    Ill-defined condition     irregular heartbeat    Nausea & vomiting     Psychiatric disorder     depression    Sleep apnea     on cpap    Thyroid disease        Past Surgical History:   Procedure Laterality Date    HX BACK SURGERY  10/10/2019    Revision SCS    HX COLONOSCOPY      HX HEENT  2003    Reconstructive surgery on face    HX KNEE REPLACEMENT  2015/2016    bilat knees    HX ORTHOPAEDIC  [id number]    back surgery X4   . MEDICATIONS      Current Outpatient Medications   Medication Sig Dispense Refill    gabapentin (NEURONTIN) 300 mg capsule TAKE 1 CAPSULE BY MOUTH TWICE DAILY      linaCLOtide (Linzess) 145 mcg cap capsule Take 1 Cap by mouth Daily (before breakfast). 30 Cap 0    methylPREDNISolone (Medrol, Dino,) 4 mg tablet Per dose pack instructions 1 Dose Pack 0    diclofenac (VOLTAREN) 1 % gel Apply 2 g to affected area three (3) times daily as needed for Pain. 100 g 1    naloxone (NARCAN) 4 mg/actuation nasal spray Use 1 spray intranasally, then discard. Repeat with new spray every 2 min as needed for opioid overdose symptoms, alternating nostrils. 1 Each 0    lisinopriL (PRINIVIL, ZESTRIL) 10 mg tablet Take 1 Tab by mouth daily. 90 Tab 1    Eliquis 5 mg tablet Take 1 tablet by mouth twice daily 60 Tab 6    tiZANidine (ZANAFLEX) 4 mg capsule Take 4 mg by mouth three (3) times daily.  propafenone (RYTHMOL) 300 mg tablet Take 1 Tab by mouth three (3) times daily. 270 Tab 3    escitalopram oxalate (LEXAPRO) 20 mg tablet Take 20 mg by mouth daily. Take 1/2 (one-half) tablet by mouth once daily in the morning for 6 days then 1 once daily in the morning      busPIRone (BUSPAR) 10 mg tablet TAKE 1 TABLET BY MOUTH THREE TIMES DAILY (Patient taking differently: Take 10 mg by mouth four (4) times daily as needed (anxiety). ) 90 Tab 0    testosterone cypionate (DEPOTESTOTERONE CYPIONATE) 200 mg/mL injection 0.5 mL by IntraMUSCular route every seven (7) days. Max Daily Amount: 100 mg. (Patient taking differently: 100 mg by IntraMUSCular route Twice a month.) 10 mL 3    cholecalciferol (VITAMIN D3) 1,000 unit tablet Take  by mouth daily.       HYDROcodone-acetaminophen (NORCO)  mg tablet Take 1 Tab by mouth every twelve (12) hours as needed for Pain. Max Daily Amount: 2 Tabs. 60 Tab 0    amLODIPine (NORVASC) 5 mg tablet Take 1 Tab by mouth daily. 90 Tab 3    levothyroxine (SYNTHROID) 100 mcg tablet Take 1 Tab by mouth Daily (before breakfast). 90 Tab 3    LORazepam (ATIVAN) 1 mg tablet Bring to procedure and take as directed. 3 Tab 0    buPROPion SR (WELLBUTRIN SR) 150 mg SR tablet TAKE 1 TABLET BY MOUTH ONCE DAILY 90 Tab 0    esomeprazole (NEXIUM) 40 mg capsule Take 1 Cap by mouth daily.  180 Cap 3        ALLERGIES    Allergies   Allergen Reactions    Latex Itching    Codeine Rash    Codeine Hives    Doxycycline Other (comments)     Dark urine    Doxycycline Itching     Urine turned black    Topamax [Topiramate] Itching     Rash, itching    Topamax [Topiramate] Hives    Verapamil Other (comments)     tachycardia    Verapamil Palpitations          SOCIAL HISTORY    Social History     Socioeconomic History    Marital status:      Spouse name: Not on file    Number of children: Not on file    Years of education: Not on file    Highest education level: Not on file   Occupational History    Not on file   Social Needs    Financial resource strain: Not on file    Food insecurity     Worry: Not on file     Inability: Not on file    Transportation needs     Medical: Not on file     Non-medical: Not on file   Tobacco Use    Smoking status: Former Smoker     Last attempt to quit:      Years since quittin.7    Smokeless tobacco: Never Used   Substance and Sexual Activity    Alcohol use: No     Frequency: Never    Drug use: No    Sexual activity: Never     Partners: Female   Lifestyle    Physical activity     Days per week: Not on file     Minutes per session: Not on file    Stress: Not on file   Relationships    Social connections     Talks on phone: Not on file     Gets together: Not on file     Attends Voodoo service: Not on file     Active member of club or organization: Not on file     Attends meetings of clubs or organizations: Not on file     Relationship status: Not on file    Intimate partner violence     Fear of current or ex partner: Not on file     Emotionally abused: Not on file     Physically abused: Not on file     Forced sexual activity: Not on file   Other Topics Concern     Service Not Asked    Blood Transfusions Not Asked    Caffeine Concern Not Asked    Occupational Exposure Not Asked    Hobby Hazards Not Asked    Sleep Concern Not Asked    Stress Concern Not Asked    Weight Concern Not Asked    Special Diet Not Asked    Back Care Not Asked    Exercise Not Asked    Bike Helmet Not Asked    [address] Not Asked    Self-Exams Not Asked   Social History Narrative    ** Merged History Encounter **          Socioeconomic History    Marital status:      Spouse name: Not on file    Number of children: Not on file    Years of education: Not on file    Highest education level: Not on file   Occupational History    Not on file   Social Needs    Financial resource strain: Not on file    Food insecurity     Worry: Not on file     Inability: Not on file    Transportation needs     Medical: Not on file     Non-medical: Not on file   Tobacco Use    Smoking status: Former Smoker     Last attempt to quit:      Years since quittin.7    Smokeless tobacco: Never Used   Substance and Sexual Activity    Alcohol use: No     Frequency: Never    Drug use: No    Sexual activity: Never     Partners: Female   Lifestyle    Physical activity     Days per week: Not on file     Minutes per session: Not on file    Stress: Not on file   Relationships    Social connections     Talks on phone: Not on file     Gets together: Not on file     Attends Rastafarian service: Not on file     Active member of club or organization: Not on file     Attends meetings of clubs or organizations: Not on file     Relationship status: Not on file    Intimate partner violence     Fear of current or ex partner: Not on file     Emotionally abused: Not on file     Physically abused: Not on file     Forced sexual activity: Not on file   Other Topics Concern     Service Not Asked    Blood Transfusions Not Asked    Caffeine Concern Not Asked    Occupational Exposure Not Asked    Hobby Hazards Not Asked    Sleep Concern Not Asked    Stress Concern Not Asked    Weight Concern Not Asked    Special Diet Not Asked    Back Care Not Asked    Exercise Not Asked    Bike Helmet Not Asked   [address] Not Asked    Self-Exams Not Asked   Social History Narrative    ** Merged History Encounter **           Problem Relation Age of Onset    Arthritis-osteo Mother     Bleeding Prob Mother     Cancer Mother     Headache Mother     Hypertension Mother     Migraines Mother     Stroke Mother 68    Arthritis-osteo Father     Stroke Father 80    Arthritis-osteo Sister     Cancer Sister     Migraines Sister     Arthritis-osteo Brother     Cancer Brother     Headache Brother     Migraines Brother     Stroke Paternal Uncle     Heart Surgery Paternal Uncle 72    Diabetes Other          Chi Khoury NP

## 2020-10-05 NOTE — PATIENT INSTRUCTIONS
Constipation: Care Instructions Your Care Instructions Constipation means that you have a hard time passing stools (bowel movements). People pass stools from 3 times a day to once every 3 days. What is normal for you may be different. Constipation may occur with pain in the rectum and cramping. The pain may get worse when you try to pass stools. Sometimes there are small amounts of bright red blood on toilet paper or the surface of stools. This is because of enlarged veins near the rectum (hemorrhoids). A few changes in your diet and lifestyle may help you avoid ongoing constipation. Your doctor may also prescribe medicine to help loosen your stool. Some medicines can cause constipation. These include pain medicines and antidepressants. Tell your doctor about all the medicines you take. Your doctor may want to make a medicine change to ease your symptoms. Follow-up care is a key part of your treatment and safety. Be sure to make and go to all appointments, and call your doctor if you are having problems. It's also a good idea to know your test results and keep a list of the medicines you take. How can you care for yourself at home? · Drink plenty of fluids, enough so that your urine is light yellow or clear like water. If you have kidney, heart, or liver disease and have to limit fluids, talk with your doctor before you increase the amount of fluids you drink. · Include high-fiber foods in your diet each day. These include fruits, vegetables, beans, and whole grains. · Get at least 30 minutes of exercise on most days of the week. Walking is a good choice. You also may want to do other activities, such as running, swimming, cycling, or playing tennis or team sports. · Take a fiber supplement, such as Citrucel or Metamucil, every day. Read and follow all instructions on the label. · Schedule time each day for a bowel movement. A daily routine may help. Take your time having your bowel movement. · Support your feet with a small step stool when you sit on the toilet. This helps flex your hips and places your pelvis in a squatting position. · Your doctor may recommend an over-the-counter laxative to relieve your constipation. Examples are Milk of Magnesia and MiraLax. Read and follow all instructions on the label. Do not use laxatives on a long-term basis. When should you call for help? Call your doctor now or seek immediate medical care if: 
  · You have new or worse belly pain.  
  · You have new or worse nausea or vomiting.  
  · You have blood in your stools. Watch closely for changes in your health, and be sure to contact your doctor if: 
  · Your constipation is getting worse.  
  · You do not get better as expected. Where can you learn more? Go to http://www.gray.com/ Enter 21 787.530.2999 in the search box to learn more about \"Constipation: Care Instructions. \" Current as of: June 26, 2019               Content Version: 12.6 © 7960-6373 [employer]. Care instructions adapted under license by [employer] (which disclaims liability or warranty for this information). If you have questions about a medical condition or this instruction, always ask your healthcare professional. [address] any warranty or liability for your use of this information.

## 2020-10-19 ENCOUNTER — OFFICE VISIT (OUTPATIENT)
Dept: ORTHOPEDIC SURGERY | Age: 65
End: 2020-10-19
Payer: MEDICARE

## 2020-10-19 VITALS
WEIGHT: 238 LBS | HEART RATE: 68 BPM | TEMPERATURE: 97.6 F | DIASTOLIC BLOOD PRESSURE: 75 MMHG | HEIGHT: 74 IN | BODY MASS INDEX: 30.54 KG/M2 | SYSTOLIC BLOOD PRESSURE: 118 MMHG | OXYGEN SATURATION: 98 %

## 2020-10-19 DIAGNOSIS — M54.9 UPPER BACK PAIN: Primary | ICD-10-CM

## 2020-10-19 DIAGNOSIS — M54.9 UPPER BACK PAIN: ICD-10-CM

## 2020-10-19 PROCEDURE — G8417 CALC BMI ABV UP PARAM F/U: HCPCS | Performed by: NURSE PRACTITIONER

## 2020-10-19 PROCEDURE — G8754 DIAS BP LESS 90: HCPCS | Performed by: NURSE PRACTITIONER

## 2020-10-19 PROCEDURE — 3017F COLORECTAL CA SCREEN DOC REV: CPT | Performed by: NURSE PRACTITIONER

## 2020-10-19 PROCEDURE — 72080 X-RAY EXAM THORACOLMB 2/> VW: CPT | Performed by: NURSE PRACTITIONER

## 2020-10-19 PROCEDURE — G8752 SYS BP LESS 140: HCPCS | Performed by: NURSE PRACTITIONER

## 2020-10-19 PROCEDURE — 1101F PT FALLS ASSESS-DOCD LE1/YR: CPT | Performed by: NURSE PRACTITIONER

## 2020-10-19 PROCEDURE — G8427 DOCREV CUR MEDS BY ELIG CLIN: HCPCS | Performed by: NURSE PRACTITIONER

## 2020-10-19 PROCEDURE — 99213 OFFICE O/P EST LOW 20 MIN: CPT | Performed by: NURSE PRACTITIONER

## 2020-10-19 PROCEDURE — G9717 DOC PT DX DEP/BP F/U NT REQ: HCPCS | Performed by: NURSE PRACTITIONER

## 2020-10-19 PROCEDURE — G8536 NO DOC ELDER MAL SCRN: HCPCS | Performed by: NURSE PRACTITIONER

## 2020-10-19 NOTE — PATIENT INSTRUCTIONS
Compression Fracture of the Spine: Care Instructions Your Care Instructions A compression fracture happens when the front part of a spinal bone breaks and collapses. A fall or other accident can cause it. A minor injury or moving the wrong way can cause a break if you have thin or brittle bones (osteoporosis). These types of breaks will heal in 8 to 10 weeks. You will need rest and pain medicines. Your doctor may recommend physical therapy. Some doctors recommend that certain people with compression fractures wear braces. Your doctor also may treat thin or brittle bones. You may need surgery if you have lasting pain or if the bone presses on the spinal cord or nerves. You heal best when you take good care of yourself. Eat a variety of healthy foods, and don't smoke. Follow-up care is a key part of your treatment and safety. Be sure to make and go to all appointments, and call your doctor if you are having problems. It's also a good idea to know your test results and keep a list of the medicines you take. How can you care for yourself at home? · Be safe with medicines. Read and follow all instructions on the label. ? If the doctor gave you a prescription medicine for pain, take it as prescribed. ? If you are not taking a prescription pain medicine, ask your doctor if you can take an over-the-counter medicine. · Talk to your doctor about how to make your bones stronger. You may need medicines or a change in what you eat. · Be active only as directed by your doctor. When should you call for help? Call 911 anytime you think you may need emergency care. For example, call if: 
  · You are unable to move an arm or a leg at all. Call your doctor now or seek immediate medical care if: 
  · You have new or worse symptoms in your arms, legs, belly, or buttocks. Symptoms may include: 
? Numbness or tingling. ? Weakness. ? Pain.  
  · You lose bladder or bowel control.   · You have belly pain, bloating, vomiting, or nausea. Watch closely for changes in your health, and be sure to contact your doctor if: 
  · You do not get better as expected. Where can you learn more? Go to http://www.gray.com/ Enter P445 in the search box to learn more about \"Compression Fracture of the Spine: Care Instructions. \" Current as of: March 2, 2020               Content Version: 12.6 © 2006-2020 [employer]. Care instructions adapted under license by [employer] (which disclaims liability or warranty for this information). If you have questions about a medical condition or this instruction, always ask your healthcare professional. [address] any warranty or liability for your use of this information.

## 2020-10-19 NOTE — PROGRESS NOTES
[address]  [address], [address]  [place], [address]  Phone: [phone]  Fax: [phone]  PROGRESS NOTE  Patient: David Stearns. MRN: [id number]       SSN: [id number]  YOB: 1955        AGE: 77 y.o. SEX: male  Body mass index is 30.56 kg/m². PCP: Simona Teran MD  10/19/20    Chief Complaint   Patient presents with    Back Pain       HISTORY OF PRESENT ILLNESS:  David Stearns. is a 77 y.o.  male with history of chronic back and leg pain. Prior history of back problems: previous spinal surgery - revision SCS 10/2019 by Dr. Jr Caban. He has an L2-5 fusion in 2008 by Dr. Tiffanie Petersen had 2 surgeries by him. Last set of x-rays 9/29/2020 stable, Grade I L5-S1 Spondy. 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 At last OV I saw him a couple of weeks ago for a flare of some back pain for 3 days. I gave him a MDP and increased his anti-constipation medicine  Today, he comes in today, his lower back pain is resolved and his constipation is better. He comes in w/ some more pin-point back pain that is around his thoraco-lumbar junction that started sometime before or after I saw him after he bent over. He denies any radicular leg pain. He is pretty miserable and has been constipated. His back hurst when he walks or bends. His pain is aching, stabbing and throbbing, pain is worse with walking, lifting, twisting and affects recreational activities. Pain is better with nothing.     Denies bladder/bowel dysfunction, saddle paresthesia, weakness, gait disturbance, or other neurological deficits.      Medications: Norco 10/325mg BID by Dr. Mau Darby. Gabapentin 300mg BID for headaches. Tizanidine 4mg PRN. On Eliquis.     PMHx: Cardiac arrhythmia- Dr. Chen Plane an active ulcerated lesion in L ear      ASSESSMENT   Diagnoses and all orders for this visit:    1.  Upper back pain  -     POC XRAY, SPINE; THOR-LUMB SP, 2 VIEW         IMPRESSION AND PLAN:  This is a pt with a poss Fracture. > Pt was given information on fracture   > check w/ ENT Dr. Stewart Session if needing kyphoplasty   >Thoracic MRI  > T to L x-ray concerning for Fx  > Mr. Claudia Griffin has a reminder for a \"due or due soon\" health maintenance. I have asked that he contact his primary care provider, Nika Devi MD, for follow-up on this health maintenance.  > We have informed patient to notify us for immediate appointment if he has any worsening neurogical symptoms or if an emergency situation presents, then call 911  >  has been reviewed and is appropriate  > Pt will follow-up in After MRI. Subjective    Pain Scale: 6/10    Pain Assessment  10/19/2020   Location of Pain Back   Location Modifiers -   Severity of Pain 6   Quality of Pain Sharp;Burning   Quality of Pain Comment -   Duration of Pain -   Duration of Pain Comment -   Frequency of Pain Constant   Aggravating Factors -   Aggravating Factors Comment -   Limiting Behavior -   Relieving Factors -   Relieving Factors Comment -   Result of Injury -   Work-Related Injury -   Type of Injury -   Type of Injury Comment -         REVIEW OF SYSTEMS  Constitutional: Negative for fever, chills, or weight change. Respiratory: Negative for cough or shortness of breath. Cardiovascular: Negative for chest pain or palpitations. Gastrointestinal: Negative for incontinence, acid reflux, change in bowel habits, or constipation. Genitourinary: Negative for incontinence, dysuria and flank pain. Musculoskeletal: Positive for BACK pain. See HPI. Skin: Negative for rash. Neurological:NO  radiculopathy. See HPI. Endo/Heme/Allergies: Negative. Psychiatric/Behavioral: Negative.       PHYSICAL EXAMINATION  Visit Vitals  /75 (BP 1 Location: Right arm, BP Patient Position: Sitting)   Pulse 68   Temp 97.6 °F (36.4 °C) (Oral)   Ht 6' 2\" (1.88 m)   Wt 238 lb (108 kg)   SpO2 98%   BMI 30.56 kg/m² Accompanied by SPOUSE. Constitutional:  Well developed, well nourished, in no acute distress. Psychiatric: Affect and mood are appropriate. Integumentary: No rashes or abrasions noted on exposed areas. Cardiovascular/Peripheral Vascular: +2 radial & pedal pulses. No peripheral edema is noted. Lymphatic:  No evidence of lymphedema. No cervical lymphadenopathy. SPINE/MUSCULOSKELETAL EXAM  Thoracic Pain  Mid upper lower thoracic region     Lumbar spine:  No rash, ecchymosis, or gross obliquity. No fasciculations. No focal atrophy is noted. Range of motion is NT. Tenderness to palpation tHORACO-LUMBAR Junction. No tenderness to palpation at the sciatic notch. SI joints non-tender. Trochanters non tender. Straight leg raise neg  Hip Impingement neg    Sensation grossly intact to light touch. MOTOR:        Biceps  Triceps Deltoids Wrist Ext Wrist Flex Hand Intrin   Right +4/5 +4/5 +4/5 +4/5 +4/5 +4/5   Left +4/5 +4/5 +4/5 +4/5 +4/5 +4/5      Hip Flex Quads Hamstrings Ankle DF EHL Ankle PF   Right +4/5 +4/5 +4/5 +4/5 +4/5 +4/5   Left +4/5 +4/5 +4/5 +4/5 +4/5 +4/5        Ambulation without assistive device.  FWB.    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    Cancer ([facility] [address]     Depression     Endocrine disease     Hypothyroidism    Fractures 1978, 2000    ankle left, left wrist    Headache     Hearing loss 2013    Hypertension     Ill-defined condition     Pain stimulator in back    Ill-defined condition     irregular heartbeat    Nausea & vomiting     Psychiatric disorder     depression    Sleep apnea     on cpap    Thyroid disease        Past Surgical History:   Procedure Laterality Date    HX BACK SURGERY  10/10/2019    Revision SCS    HX COLONOSCOPY      HX HEENT  2003    Reconstructive surgery on face    HX KNEE REPLACEMENT  2015/2016    bilat knees    HX ORTHOPAEDIC  [id number]    back surgery X4   . MEDICATIONS      Current Outpatient Medications   Medication Sig Dispense Refill    gabapentin (NEURONTIN) 300 mg capsule TAKE 1 CAPSULE BY MOUTH TWICE DAILY      linaCLOtide (Linzess) 145 mcg cap capsule Take 1 Cap by mouth Daily (before breakfast). 30 Cap 0    diclofenac (VOLTAREN) 1 % gel Apply 2 g to affected area three (3) times daily as needed for Pain. 100 g 1    lisinopriL (PRINIVIL, ZESTRIL) 10 mg tablet Take 1 Tab by mouth daily. 90 Tab 1    Eliquis 5 mg tablet Take 1 tablet by mouth twice daily 60 Tab 6    tiZANidine (ZANAFLEX) 4 mg capsule Take 4 mg by mouth three (3) times daily.  propafenone (RYTHMOL) 300 mg tablet Take 1 Tab by mouth three (3) times daily. 270 Tab 3    escitalopram oxalate (LEXAPRO) 20 mg tablet Take 20 mg by mouth daily. Take 1/2 (one-half) tablet by mouth once daily in the morning for 6 days then 1 once daily in the morning      busPIRone (BUSPAR) 10 mg tablet TAKE 1 TABLET BY MOUTH THREE TIMES DAILY (Patient taking differently: Take 10 mg by mouth four (4) times daily as needed (anxiety). ) 90 Tab 0    buPROPion SR (WELLBUTRIN SR) 150 mg SR tablet TAKE 1 TABLET BY MOUTH ONCE DAILY 90 Tab 0    testosterone cypionate (DEPOTESTOTERONE CYPIONATE) 200 mg/mL injection 0.5 mL by IntraMUSCular route every seven (7) days. Max Daily Amount: 100 mg. (Patient taking differently: 100 mg by IntraMUSCular route Twice a month.) 10 mL 3    esomeprazole (NEXIUM) 40 mg capsule Take 1 Cap by mouth daily. 180 Cap 3    cholecalciferol (VITAMIN D3) 1,000 unit tablet Take  by mouth daily.  HYDROcodone-acetaminophen (NORCO)  mg tablet Take 1 Tab by mouth every twelve (12) hours as needed for Pain. Max Daily Amount: 2 Tabs. 60 Tab 0    amLODIPine (NORVASC) 5 mg tablet Take 1 Tab by mouth daily.  90 Tab 3    levothyroxine (SYNTHROID) 100 mcg tablet Take 1 Tab by mouth Daily (before breakfast). 90 Tab 3    naloxone (NARCAN) 4 mg/actuation nasal spray Use 1 spray intranasally, then discard. Repeat with new spray every 2 min as needed for opioid overdose symptoms, alternating nostrils.  1 Each 0        ALLERGIES    Allergies   Allergen Reactions    Latex Itching    Codeine Rash    Codeine Hives    Doxycycline Other (comments)     Dark urine    Doxycycline Itching     Urine turned black    Topamax [Topiramate] Itching     Rash, itching    Topamax [Topiramate] Hives    Verapamil Other (comments)     tachycardia    Verapamil Palpitations          SOCIAL HISTORY    Social History     Socioeconomic History    Marital status:      Spouse name: Not on file    Number of children: Not on file    Years of education: Not on file    Highest education level: Not on file   Occupational History    Not on file   Social Needs    Financial resource strain: Not on file    Food insecurity     Worry: Not on file     Inability: Not on file    Transportation needs     Medical: Not on file     Non-medical: Not on file   Tobacco Use    Smoking status: Former Smoker     Last attempt to quit: 1987     Years since quittin.8    Smokeless tobacco: Never Used   Substance and Sexual Activity    Alcohol use: No     Frequency: Never    Drug use: No    Sexual activity: Never     Partners: Female   Lifestyle    Physical activity     Days per week: Not on file     Minutes per session: Not on file    Stress: Not on file   Relationships    Social connections     Talks on phone: Not on file     Gets together: Not on file     Attends Yarsanism service: Not on file     Active member of club or organization: Not on file     Attends meetings of clubs or organizations: Not on file     Relationship status: Not on file    Intimate partner violence     Fear of current or ex partner: Not on file     Emotionally abused: Not on file     Physically abused: Not on file     Forced sexual activity: Not on file   Other Topics Concern     Service Not Asked    Blood Transfusions Not Asked    Caffeine Concern Not Asked    Occupational Exposure Not Asked    Hobby Hazards Not Asked    Sleep Concern Not Asked    Stress Concern Not Asked    Weight Concern Not Asked    Special Diet Not Asked    Back Care Not Asked    Exercise Not Asked    Bike Helmet Not Asked   [address] Not Asked    Self-Exams Not Asked   Social History Narrative    ** Merged History Encounter **            FAMILY HISTORY    Family History   Problem Relation Age of Onset    Arthritis-osteo Mother     Bleeding Prob Mother     Cancer Mother     Headache Mother     Hypertension Mother     Migraines Mother     Stroke Mother 68    Arthritis-osteo Father     Stroke Father 80    Arthritis-osteo Sister     Cancer Sister     Migraines Sister     Arthritis-osteo Brother     Cancer Brother     Headache Brother     Migraines Brother     Stroke Paternal Uncle     Heart Surgery Paternal Uncle 72    Diabetes Other          Lucero Montero NP

## 2020-11-13 NOTE — PROGRESS NOTES
[facility]  [address], [place]   Phone: [phone]  Fax: [phone]        PROGRESS NOTE      HISTORY OF PRESENT ILLNESS:  The patient is a 77 y.o. male and was seen today for follow up of low back pain radiating into the LLE in an S1 distribution to the knee, with recent exacerbation after a fall out of the back of a [employer]haul on 12/18/18. Pt underwent left SI joint injection on 6/27/19 with benefit x 1 week (80% improvement). Pt reports bowel incontinence, with immediate onset following the incident. Denies bladder incontinence. Pt denies fever, weight loss, or skin changes. The patient is RHD. PmHx spinal surgery x 3, SCS implantation ([employer]), squamous cell carcinoma. Pt is currently being followed by Dr. Park Shirley for chronic pain management. Note from Deidra Pardo MD dated 3/7/19 indicating patient was seen with c/o left hip and groin pain. Noted, pt is being followed by Dr. Park Shirley for pain management. Note from Dr. Alexx Briscoe 3/22/19 indicating patient was seen with c/o left hip pain since an accident on 12/18/18 when he fell out of the back of a [employer]haul. He landed hard on his buttocks and has been experiencing pain ever since. Performed a left bursa injection at that time. Of note, pt has had spinal surgeries x 4 and had severe bleeding complications with his last surgery. Note from Dr. Mike Burton dated 7/15/19 indicating patient was seen with c/o closed displaced fracture of proximal phalanx of lesser toe of left foot with routine healing, subsequent encounter. Preliminary reading of lumbar plain films: SCS noted in the right lower quadrant with leads extending into the spinal column. Posterior fusion L2 through S1, with posterior hardware extending from L2 through L5. Hardware appears to be intact. Moderate degenerative changes noted extending from L1 to the lower thoracic spine. Thoracic spine CT myelogram dated 5/29/19 reviewed.  Per report, mild spinal canal narrowing at T10-11 due to facet arthropathy and ligamentum flavum hypertrophy. Moderate left foraminal narrowing at T3-4 due to a left facet arthropathy. Mild spondylosis of the remaining thoracic spine with no significant stenosis. Spinal cord stimulator electrodes at T8. Lumbar spine CT myelogram dated 5/29/19 reviewed. Per report, postoperative changes from L2 to S1 from posterior spinal decompression and fusion. The central canal and recesses are well decompressed at these levels. Moderate central canal and right foraminal narrowing at L1-L2 due to a disc bulge and facet arthropathy. Findings consistent with arachnoiditis. Nonobstructing right renal calculus. At his last clinical appointment, patient was s/p left SI joint injection, noting benefit (80% improvement). I recommended he continue to f/u with Dr. Iesha Mojica for pain management.       The patient returns today with midline thoracic spine pain x 4-5 months without trauma. He rates his pain 6/10, previously 5/10. Pt clarifies King Morton NP was worried of possible T spine compression fracture and ordered a T spine MRI. Pt was started on Neurontin 300 mg BID by his Neurologist. Pt continues to be followed by Dr. Iesha Mojica, pain management and is being treated with Oxycodone. Pt previously completed PT and is noncompliant with his HEP. Pt reports an intended 45 Ib weight loss. Pt was seen by a [employer] rep in office. He reported having 80% relief of his lower extremity pain and 50% relief of his back pain with the SCS. The rep indicated she adjusted his SCS with a program targeted more for his lower back than legs. Pt has been dx with malignant neoplasm of parotid gland. Note from Dr. Schuyler Still dated 11/29/2019 indicating patient was seen 6 weeks out from revision of his SCS generator. He is quite pleased with its effectiveness.  Note from King Morton NP dated 10/19/2020 indicating patient was seen with c/o upper back pain. Pt has h/o chronic back and leg pain. Previous spinal surgery- revision SCS 10/2019 by Dr. Gunjan Ramirez. He has an L2-L3 fusion in 2008 by Dr. Deondre Saldaña. Ordered T spine MRI. L spine XR dated 9/29/2020 films not available for my independent review. Per report, stable postsurgical changes. No acute hardware abnormality. There is apparent increase in anterolisthesis at L5-S1 although positioning is suboptimal. Cross-sectional imaging may be of benefit for confirmation. T spine MRI dated 10/30/2020 films independently reviewed. Per report, postsurgical changes related to spinal cord stimulator device. Mild central canal stenosis at T10-T11. Multilevel degenerative changes. Suggestion of bilateral renal cystic lesions, suboptimally evaluated. Correlation with renal ultrasound is recommended.  reviewed. Body mass index is 31.1 kg/m².     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    Cancer ([facility] [address]     Depression     Endocrine disease     Hypothyroidism    Fractures 1978, 2000    ankle left, left wrist    Headache     Hearing loss 2013    Hypertension     Ill-defined condition     Pain stimulator in back    Ill-defined condition     irregular heartbeat    Nausea & vomiting     Psychiatric disorder     depression    Sleep apnea     on cpap    Thyroid disease         Social History     Socioeconomic History    Marital status:      Spouse name: Not on file    Number of children: Not on file    Years of education: Not on file    Highest education level: Not on file   Occupational History    Not on file   Social Needs    Financial resource strain: Not on file    Food insecurity     Worry: Not on file     Inability: Not on file    Transportation needs     Medical: Not on file     Non-medical: Not on file   Tobacco Use    Smoking status: Former Smoker     Last attempt to quit:      Years since quittin.8    Smokeless tobacco: Never Used   Substance and Sexual Activity    Alcohol use: No     Frequency: Never    Drug use: No    Sexual activity: Never     Partners: Female   Lifestyle    Physical activity     Days per week: Not on file     Minutes per session: Not on file    Stress: Not on file   Relationships    Social connections     Talks on phone: Not on file     Gets together: Not on file     Attends Mandaeism service: Not on file     Active member of club or organization: Not on file     Attends meetings of clubs or organizations: Not on file     Relationship status: Not on file    Intimate partner violence     Fear of current or ex partner: Not on file     Emotionally abused: Not on file     Physically abused: Not on file     Forced sexual activity: Not on file   Other Topics Concern     Service Not Asked    Blood Transfusions Not Asked    Caffeine Concern Not Asked    Occupational Exposure Not Asked   Aida Heymann Hazards Not Asked    Sleep Concern Not Asked    Stress Concern Not Asked    Weight Concern Not Asked    Special Diet Not Asked    Back Care Not Asked    Exercise Not Asked    Bike Helmet Not Asked    Seat Belt Not Asked    Self-Exams Not Asked   Social History Narrative    ** Merged History Encounter **            Current Outpatient Medications   Medication Sig Dispense Refill    gabapentin (NEURONTIN) 300 mg capsule TAKE 1 CAPSULE BY MOUTH TWICE DAILY      linaCLOtide (Linzess) 145 mcg cap capsule Take 1 Cap by mouth Daily (before breakfast). 30 Cap 0    diclofenac (VOLTAREN) 1 % gel Apply 2 g to affected area three (3) times daily as needed for Pain. 100 g 1    naloxone (NARCAN) 4 mg/actuation nasal spray Use 1 spray intranasally, then discard. Repeat with new spray every 2 min as needed for opioid overdose symptoms, alternating nostrils.  1 Each 0    lisinopriL (PRINIVIL, ZESTRIL) 10 mg tablet Take 1 Tab by mouth daily. 90 Tab 1    Eliquis 5 mg tablet Take 1 tablet by mouth twice daily 60 Tab 6    tiZANidine (ZANAFLEX) 4 mg capsule Take 4 mg by mouth three (3) times daily.  propafenone (RYTHMOL) 300 mg tablet Take 1 Tab by mouth three (3) times daily. 270 Tab 3    escitalopram oxalate (LEXAPRO) 20 mg tablet Take 20 mg by mouth daily. Take 1/2 (one-half) tablet by mouth once daily in the morning for 6 days then 1 once daily in the morning      busPIRone (BUSPAR) 10 mg tablet TAKE 1 TABLET BY MOUTH THREE TIMES DAILY (Patient taking differently: Take 10 mg by mouth four (4) times daily as needed (anxiety). ) 90 Tab 0    buPROPion SR (WELLBUTRIN SR) 150 mg SR tablet TAKE 1 TABLET BY MOUTH ONCE DAILY 90 Tab 0    testosterone cypionate (DEPOTESTOTERONE CYPIONATE) 200 mg/mL injection 0.5 mL by IntraMUSCular route every seven (7) days. Max Daily Amount: 100 mg. (Patient taking differently: 100 mg by IntraMUSCular route Twice a month.) 10 mL 3    esomeprazole (NEXIUM) 40 mg capsule Take 1 Cap by mouth daily. 180 Cap 3    cholecalciferol (VITAMIN D3) 1,000 unit tablet Take  by mouth daily.  HYDROcodone-acetaminophen (NORCO)  mg tablet Take 1 Tab by mouth every twelve (12) hours as needed for Pain. Max Daily Amount: 2 Tabs. 60 Tab 0    amLODIPine (NORVASC) 5 mg tablet Take 1 Tab by mouth daily. 90 Tab 3    levothyroxine (SYNTHROID) 100 mcg tablet Take 1 Tab by mouth Daily (before breakfast).  90 Tab 3       Allergies   Allergen Reactions    Latex Itching    Codeine Rash    Codeine Hives    Doxycycline Other (comments)     Dark urine    Doxycycline Itching     Urine turned black    Topamax [Topiramate] Itching     Rash, itching    Topamax [Topiramate] Hives    Verapamil Other (comments)     tachycardia    Verapamil Palpitations          PHYSICAL EXAMINATION    Visit Vitals  BP (!) 141/88 (BP 1 Location: Left arm, BP Patient Position: Sitting)   Pulse 68   Temp 97.9 °F (36.6 °C) (Temporal)   Wt 242 lb 3.2 oz (109.9 kg)   SpO2 100%   BMI 31.10 kg/m²       CONSTITUTIONAL: NAD, A&O x 3  SENSATION: Decreased sensation to light touch on the LLE in a L4 distribution and on the BLE in a S1 distribution. Otherwise, intact to light touch throughout  RANGE OF MOTION: The patient has full passive range of motion in all four extremities. MOTOR:  Straight Leg Raise: Negative, bilateral                 Hip Flex Knee Ext Knee Flex Ankle DF GTE Ankle PF Tone   Right +4/5 +4/5 +4/5 +4/5 +4/5 +4/5 +4/5   Left +4/5 +4/5 +4/5 +4/5 +4/5 +4/5 +4/5       ASSESSMENT   Diagnoses and all orders for this visit:    1. Thoracic spondylosis without myelopathy    2. DDD (degenerative disc disease), thoracic    3. Lumbar post-laminectomy syndrome    4. Spondylolisthesis, acquired        IMPRESSION AND PLAN:  Patient returns to the office today with c/o midline thoracic spine pain. Multiple treatment options were discussed. I will refer him back to Hughie Gottron, MD in regards to suggestion of bilateral renal cystic lesions noted on his MRI. I will refer him to physical therapy with an emphasis on HEP. Patient is neurologically intact. I will see the patient back in 6 week's time or earlier if needed. Written by Herman Vegas, as dictated by Yousuf Rubi MD  I examined the patient, reviewed and agree with the note.

## 2020-11-16 ENCOUNTER — OFFICE VISIT (OUTPATIENT)
Dept: ORTHOPEDIC SURGERY | Age: 65
End: 2020-11-16
Payer: MEDICARE

## 2020-11-16 VITALS
HEART RATE: 68 BPM | OXYGEN SATURATION: 100 % | DIASTOLIC BLOOD PRESSURE: 88 MMHG | SYSTOLIC BLOOD PRESSURE: 141 MMHG | WEIGHT: 242.2 LBS | TEMPERATURE: 97.9 F | BODY MASS INDEX: 31.1 KG/M2

## 2020-11-16 DIAGNOSIS — M43.10 SPONDYLOLISTHESIS, ACQUIRED: ICD-10-CM

## 2020-11-16 DIAGNOSIS — M51.34 DDD (DEGENERATIVE DISC DISEASE), THORACIC: ICD-10-CM

## 2020-11-16 DIAGNOSIS — M47.814 THORACIC SPONDYLOSIS WITHOUT MYELOPATHY: Primary | ICD-10-CM

## 2020-11-16 DIAGNOSIS — M96.1 LUMBAR POST-LAMINECTOMY SYNDROME: ICD-10-CM

## 2020-11-16 PROCEDURE — G8417 CALC BMI ABV UP PARAM F/U: HCPCS | Performed by: PHYSICAL MEDICINE & REHABILITATION

## 2020-11-16 PROCEDURE — 1101F PT FALLS ASSESS-DOCD LE1/YR: CPT | Performed by: PHYSICAL MEDICINE & REHABILITATION

## 2020-11-16 PROCEDURE — G8754 DIAS BP LESS 90: HCPCS | Performed by: PHYSICAL MEDICINE & REHABILITATION

## 2020-11-16 PROCEDURE — G8753 SYS BP > OR = 140: HCPCS | Performed by: PHYSICAL MEDICINE & REHABILITATION

## 2020-11-16 PROCEDURE — G9717 DOC PT DX DEP/BP F/U NT REQ: HCPCS | Performed by: PHYSICAL MEDICINE & REHABILITATION

## 2020-11-16 PROCEDURE — G8427 DOCREV CUR MEDS BY ELIG CLIN: HCPCS | Performed by: PHYSICAL MEDICINE & REHABILITATION

## 2020-11-16 PROCEDURE — G8536 NO DOC ELDER MAL SCRN: HCPCS | Performed by: PHYSICAL MEDICINE & REHABILITATION

## 2020-11-16 PROCEDURE — 3017F COLORECTAL CA SCREEN DOC REV: CPT | Performed by: PHYSICAL MEDICINE & REHABILITATION

## 2020-11-16 PROCEDURE — 99214 OFFICE O/P EST MOD 30 MIN: CPT | Performed by: PHYSICAL MEDICINE & REHABILITATION

## 2020-11-16 NOTE — LETTER
11/16/20 Patient: Parth Fletcher. YOB: 1955 Date of Visit: 11/16/2020 Erin Machado MD 
[address] [place] [address] [address] [address] VIA Facsimile: [phone] Dear Erin Machado MD, Thank you for referring Mr. Krystle Brandt to [address] for evaluation. My notes for this consultation are attached. If you have questions, please do not hesitate to call me. I look forward to following your patient along with you. Sincerely, Blank Moraes MD

## 2020-11-24 ENCOUNTER — HOSPITAL ENCOUNTER (OUTPATIENT)
Dept: PHYSICAL THERAPY | Age: 65
Discharge: HOME | End: 2020-11-24
Attending: PHYSICAL MEDICINE & REHABILITATION
Payer: MEDICARE

## 2020-11-24 PROCEDURE — 97110 THERAPEUTIC EXERCISES: CPT

## 2020-11-24 PROCEDURE — 97140 MANUAL THERAPY 1/> REGIONS: CPT

## 2020-11-24 PROCEDURE — 97162 PT EVAL MOD COMPLEX 30 MIN: CPT

## 2020-11-24 NOTE — PROGRESS NOTES
PT DAILY TREATMENT NOTE     Patient Name: Farshad Munoz.   Date:2020  : 1955  [x]  Patient  Verified  Payor: Azar Garcia / Plan: [facility] [employer] MEDICARE CHOICE PPO/PFFS / Product Type: Managed Care Medicare /    In time:832  Out time:930  Total Treatment Time (min): 58  Visit #: 1 of     Medicare/BCBS Only   Total Timed Codes (min):  53 1:1 Treatment Time:  53       Treatment Area: Lower back pain [M54.5]  Pain in thoracic spine [M54.6]    SUBJECTIVE  Pain Level (0-10 scale): 6  Any medication changes, allergies to medications, adverse drug reactions, diagnosis change, or new procedure performed?: [x] No    [] Yes (see summary sheet for update)  Subjective functional status/changes:   [] No changes reported  Pt initial eval see POC for details    OBJECTIVE    Modality rationale: decrease pain to improve the patient's ability to tolerate ADLs   Min Type Additional Details    [] Estim:  []Unatt       []IFC  []Premod                        []Other:  []w/ice   []w/heat  Position:  Location:    [] Estim: []Att    []TENS instruct  []NMES                    []Other:  []w/US   []w/ice   []w/heat  Position:  Location:    []  Traction: [] Cervical       []Lumbar                       [] Prone          []Supine                       []Intermittent   []Continuous Lbs:  [] before manual  [] after manual    []  Ultrasound: []Continuous   [] Pulsed                           []1MHz   []3MHz W/cm2:  Location:    []  Iontophoresis with dexamethasone         Location: [] Take home patch   [] In clinic   5 []  Ice     [x]  heat  []  Ice massage  []  Laser   []  Anodyne Position:prone  Location:lumbar spine    []  Laser with stim  []  Other:  Position:  Location:    []  Vasopneumatic Device Pressure:       [] lo [] med [] hi   Temperature: [] lo [] med [] hi   [] Skin assessment post-treatment:  []intact []redness- no adverse reaction    []redness - adverse reaction:     30 min [x]Eval []Re-Eval       10 min Therapeutic Exercise:  [] See flow sheet :   Rationale: increase ROM and increase strength to improve the patient's ability to sit, walk, and perform yard work    13 min Manual Therapy:  stm to leticia lumbar paraspinals, inc focus on L lumbar quadrant, manual quad and hip IR/ER stretch LETICIA   The manual therapy interventions were performed at a separate and distinct time from the therapeutic activities interventions. Rationale: decrease pain, increase ROM and increase tissue extensibility to improve tissue excursion during functional mobility and ADLs              With   [] TE   [] TA   [] neuro   [] other: Patient Education: [x] Review HEP    [] Progressed/Changed HEP based on:   [] positioning   [] body mechanics   [] transfers   [] heat/ice application    [] other:      Other Objective/Functional Measures:    Justification for Eval Code Complexity:  Patient History : see POC   Examination see exam   Clinical Presentation: evolving  Clinical Decision Making : FOTO : 43/100    Pain Level (0-10 scale) post treatment: 6    ASSESSMENT/Changes in Function: Pt was instructed in initial HEP required demo, vc, and tc for all exercises to perform correctly. Pt was given hand out detailing exercises and instructed in modification of exercises to tolerance, and in performing exercises safely. Pt verbalized understanding. Patient will continue to benefit from skilled PT services to modify and progress therapeutic interventions, address functional mobility deficits, address ROM deficits, address strength deficits, analyze and address soft tissue restrictions, analyze and cue movement patterns, analyze and modify body mechanics/ergonomics, assess and modify postural abnormalities, address imbalance/dizziness and instruct in home and community integration to attain remaining goals.      [x]  See Plan of Care  []  See progress note/recertification  []  See Discharge Summary         Progress towards goals / Updated goals:  No progress as goals were set today    PLAN  []  Upgrade activities as tolerated     []  Continue plan of care  []  Update interventions per flow sheet       []  Discharge due to:_  []  Other:_        Teresa Corley 11/24/2020  8:31 AM    Future Appointments   Date Time Provider Darian Dumont   11/25/2020  5:00 PM [facility] MRI 1 [facility]   12/4/2020  8:45 AM Titus Pearson MD [facility] BS AMB   1/4/2021 10:10 AM Elba Araujo MD [facility] BS AMB   1/6/2021  8:00 AM Lilibeth Miranda MD [facility] BS AMB

## 2020-11-24 NOTE — PROGRESS NOTES
[address] [facility] AT [address], [address] [place] [address] Road  Phone: [phone]   [phone]  PLAN OF CARE / [address] [place] [address] PHYSICAL THERAPY SERVICES  Patient Name: Marcio Morton : 1955   Medical   Diagnosis: Lower back pain [M54.5]  Pain in thoracic spine [M54.6] Treatment Diagnosis: Lower back pain [M54.5]  Pain in thoracic spine [M54.6]   Onset Date: ~May 2020     Referral Source: Raya Lima MD [facility] 2020   Prior Hospitalization: See medical history Provider #: [id number]   Prior Level of Function: I in all ADLs and functional mobility. Able to walk 20 mins without pain, sit for 1 hour, and perform yard work. Comorbidities: Depression, thyroid problems, HTN, visually impaired, hearing impaired, latex allergy, cancer, recent weight change of more than 10 lbs   Medications: Verified on Patient Summary List   The Plan of Care and following information is based on the information from the initial evaluation.   ===========================================================================================  Assessment / key information:  Pt is a 77y.o. year old M who presents with chronic LBP with exacerbation of sx starting around 6 months prior. Signs and sx are consistent with lumbar disc injury and SI jt dysfunction. Current deficits include: dec hip ROM, dec dynamic hip stability, dec core stability, dec LE strength. Functional deficits include: impaired ambulation, sitting, and lifting. FOTO score indicates 57% functional impairment. Home exercise program initiated on initial evaluation to address these deficits. Pt would benefit from PT to address these deficits for increased functional mobility and QOL. NOREEN: Pt reports that his back started hurting about 6 months ago.  He was bending over reaching toward the bottom of the fence, when he went to stand his back was immediately painful and he couldn't walk. A few weeks before the bending over incident he slipped on some rocks and fell on his butt which he feels like could have also contributed to his back. He saw his MD who did an MRI a couple weeks ago, and he will have another tomorrow. He reports that the MRI showed a disc injury below the area that was fused (fusion is from L2-S1). He has done PT before after his surgeries, which he reports helped then, but it has been many years since then. His medical history is complex and involves over 50 procedures. He has a hx of parotid cancer, LETICIA TKA, and hardware in his spine. He also has [employer] spinal cord stimulator in his back. The pain has improved with losing weight, he has made diet changes and has lost 45 lbs in the last 3 months intentionally. Laying flat on his back helps, Heat also helps with the pain as does the spinal cord stimulator. He describes the pain as a constant stinging ache in his low back on both sides R>L. The pain also goes into his legs L>R. The L leg pain goes all the way to the foot, and he does report some long standing numbness in his last two toes on that foot from the surgery. The pain is worse with sitting for prolonged periods, bending, lifting, and walking. Prior to the recent exacerbation in sx he was able to walk for 20 mins with his dog, sit for an hour, and do yard work with mild and manageable to no sx. OBJECTIVE:    PAIN:  Location- low back LEITCIA LE  Current-6  Best-6  Worst-8  Alleviating factors: laying on back, heat, spinal cord stimulator  Aggravating factors: lifting, walking, sitting    MMT:  Hip and knee grossly 3/5 with back pain provoked with all active movement L>R  -flex L=3/5! R=3/5!  -ext L=3-/5! R=3/5!  -abd L=3/5! R=3/5! Knee  -flex L=3/5 R=3/5  -ext L=3/5! R=4/5!     Sensation: sensory deficits in L SI nerve distribution pt reports long standing from back surgery  Posture: overall flattened rigid spine, mild inc kyphosis    AROM: Lumbar ROM limited to grossly 20% ROM in all planes likely due to presence of fusion, Pain inc with side bending and flexion- no reversal of lordosis with flexion likely due to fusion  Hip  Flexion- Reyes 60 deg SLR  Extension- L= 0 deg!, R= -10 deg! Piriformis less than 45 deg REYES    Outcome Measure: FOTO= 43    Palpation for Tenderness: TTP to reyes lumbar multifidus, QL, SI jt L>R  Special Tests:  Fortins sign- positive  SLR- negative  FABERs- positive  Slump Test- positive  Elys test positive REYES    ===========================================================================================  Eval Complexity: History HIGH Complexity :3+ comorbidities / personal factors will impact the outcome/ POC ;  Examination  MEDIUM Complexity : 3 Standardized tests and measures addressing body structure, function, activity limitation and / or participation in recreation ; Presentation MEDIUM Complexity : Evolving with changing characteristics ;   Decision Making MEDIUM Complexity : FOTO score of 26-74; Overall Complexity MEDIUM  Problem List: pain affecting function, decrease ROM, decrease strength, edema affecting function, impaired gait/ balance, decrease ADL/ functional abilitiies, decrease activity tolerance, decrease flexibility/ joint mobility and decrease transfer abilities   Treatment Plan may include any combination of the following: Therapeutic exercise, Therapeutic activities, Neuromuscular re-education, Physical agent/modality, Gait/balance training, Manual therapy, Patient education, Self Care training, Functional mobility training, Home safety training and Stair training  Patient / Family readiness to learn indicated by: asking questions  Persons(s) to be included in education: patient (P)  Barriers to Learning/Limitations: yes;  sensory deficits-vision/hearing/speech  Measures taken, if barriers to learning:    Patient Goal (s): Ease pain   Patient self reported health status: fair  Rehabilitation Potential: good  Short Term Goals: To be accomplished in 1 weeks:  1) Goal: Patient will be independent and compliant with HEP in order to progress toward long term goals. Status at last note/certification: issued and reviewed  Long Term Goals: To be accomplished in 8-12 treatments:  1) Goal: Patient will improve FOTO assessment score to >=53  pts in order to indicate improved functional abilities. Status at last note/certification: 43 pts  2) Goal: Patient will improve LETICIA hip extension to 20 deg for improved posture and gait mechanics  Status at last note/certification: L= 0 deg!, R= -10 deg! 3) Goal: Patient will report worst back pain as 3/10 or less in order to progress toward personal goals. Status at last note/certification: 7/20  4) Goal: Patient will report overall at least 65% improvement in function in order to progress toward premorbid status. Status at last note/certification: n/a  5) Goal: Patient will report ability to walk for 20 mins with mild and manageable to no pain to care for his dog  Status at last note/certification: 5 mins with pain progressing the longer he walks  6) Goal: Patient will report ability to sit for 1 hour for meals  Status at last note/certification: 32-43 mins  Frequency / Duration:   Patient to be seen  1-2  times per week for 4-6  weeks:  Patient / Caregiver education and instruction: exercises  Therapist Signature: Yolanda Vasquez Date: 14/90/7630   Certification Period: na Time: 8:30 AM   ===========================================================================================  I certify that the above Physical Therapy Services are being furnished while the patient is under my care. I agree with the treatment plan and certify that this therapy is necessary. Physician Signature:        Date:       Time:     Please sign and return [facility] or you may fax the signed copy to [phone]. Thank you.

## 2020-12-02 ENCOUNTER — APPOINTMENT (OUTPATIENT)
Dept: PHYSICAL THERAPY | Age: 65
End: 2020-12-02
Attending: PHYSICAL MEDICINE & REHABILITATION
Payer: MEDICARE

## 2020-12-04 ENCOUNTER — APPOINTMENT (OUTPATIENT)
Dept: PHYSICAL THERAPY | Age: 65
End: 2020-12-04
Attending: PHYSICAL MEDICINE & REHABILITATION
Payer: MEDICARE

## 2020-12-08 ENCOUNTER — APPOINTMENT (OUTPATIENT)
Dept: PHYSICAL THERAPY | Age: 65
End: 2020-12-08
Attending: PHYSICAL MEDICINE & REHABILITATION
Payer: MEDICARE

## 2020-12-10 ENCOUNTER — APPOINTMENT (OUTPATIENT)
Dept: PHYSICAL THERAPY | Age: 65
End: 2020-12-10
Attending: PHYSICAL MEDICINE & REHABILITATION
Payer: MEDICARE

## 2020-12-15 ENCOUNTER — APPOINTMENT (OUTPATIENT)
Dept: PHYSICAL THERAPY | Age: 65
End: 2020-12-15
Attending: PHYSICAL MEDICINE & REHABILITATION
Payer: MEDICARE

## 2020-12-17 ENCOUNTER — APPOINTMENT (OUTPATIENT)
Dept: PHYSICAL THERAPY | Age: 65
End: 2020-12-17
Attending: PHYSICAL MEDICINE & REHABILITATION
Payer: MEDICARE

## 2020-12-21 ENCOUNTER — APPOINTMENT (OUTPATIENT)
Dept: PHYSICAL THERAPY | Age: 65
End: 2020-12-21
Attending: PHYSICAL MEDICINE & REHABILITATION
Payer: MEDICARE

## 2020-12-28 ENCOUNTER — HOSPITAL ENCOUNTER (OUTPATIENT)
Dept: PHYSICAL THERAPY | Age: 65
Discharge: HOME | End: 2020-12-28
Attending: PHYSICAL MEDICINE & REHABILITATION
Payer: MEDICARE

## 2020-12-28 PROCEDURE — 97140 MANUAL THERAPY 1/> REGIONS: CPT

## 2020-12-28 PROCEDURE — 97110 THERAPEUTIC EXERCISES: CPT

## 2020-12-28 NOTE — PROGRESS NOTES
PT DAILY TREATMENT NOTE     Patient Name: Kal Farris. Date:2020  : 1955  [x]  Patient  Verified  Payor: Bing De Oliveira / Plan: [facility] MEDICARE CHOICE PPO/PFFS / Product Type: Managed Care Medicare /    In time: 8:25  Out time: 9:20  Total Treatment Time (min): 55  Visit #: 2 of     Medicare/BCBS Only   Total Timed Codes (min):  45 1:1 Treatment Time: 38       Treatment Area: Lower back pain [M54.5]  Pain in thoracic spine [M54.6]    SUBJECTIVE  Pain Level (0-10 scale): 4/10 L/S and left LE radicular sx  Any medication changes, allergies to medications, adverse drug reactions, diagnosis change, or new procedure performed?: [x] No    [] Yes (see summary sheet for update)  Subjective functional status/changes:   [] No changes reported  Patient reports he is wearing his spinal cord stimulator, if he was not wearing it his pain would be a 6/10. He also reports he is hard of hearing and has not received his new hearing aids yet.      OBJECTIVE    Modality rationale: decrease pain to improve the patient's ability to tolerate ADLs   Min Type Additional Details    [] Estim:  []Unatt       []IFC  []Premod                        []Other:  []w/ice   []w/heat  Position:  Location:    [] Estim: []Att    []TENS instruct  []NMES                    []Other:  []w/US   []w/ice   []w/heat  Position:  Location:    []  Traction: [] Cervical       []Lumbar                       [] Prone          []Supine                       []Intermittent   []Continuous Lbs:  [] before manual  [] after manual    []  Ultrasound: []Continuous   [] Pulsed                           []1MHz   []3MHz W/cm2:  Location:    []  Iontophoresis with dexamethasone         Location: [] Take home patch   [] In clinic   10 []  Ice     [x]  heat  []  Ice massage  []  Laser   []  Anodyne Position:prone  Location:lumbar spine    []  Laser with stim  []  Other:  Position:  Location:    []  Vasopneumatic Device Pressure:       [] lo [] med [] hi   Temperature: [] lo [] med [] hi   [x] Skin assessment post-treatment:  [x]intact []redness- no adverse reaction    []redness  adverse reaction:     30 min Therapeutic Exercise:  [x] See flow sheet : Plinth in semi-reclined   Rationale: increase ROM and increase strength to improve the patient's ability to sit, walk, and perform yard work    15 min Manual Therapy:  stm to leticia lumbar paraspinals, inc focus on L lumbar quadrant, manual quad and hip IR/ER stretch LETICIA   The manual therapy interventions were performed at a separate and distinct time from the therapeutic activities interventions. Rationale: decrease pain, increase ROM and increase tissue extensibility to improve tissue excursion during functional mobility and ADLs          With   [] TE   [] TA   [] neuro   [] other: Patient Education: [x] Review HEP    [] Progressed/Changed HEP based on:   [] positioning   [] body mechanics   [] transfers   [] heat/ice application    [] other:      Other Objective/Functional Measures:    - increased pain with eccentric bridge  - increased pain in left lumbar and left LE with Pallof Press    Pain Level (0-10 scale) post treatment: 4/10     ASSESSMENT/Changes in Function:  Initiated PT POC today per flow sheet, requiring vc and demo 100% of the time for proper form and technique with TE. Patient will continue to benefit from skilled PT services to modify and progress therapeutic interventions, address functional mobility deficits, address ROM deficits, address strength deficits, analyze and address soft tissue restrictions, analyze and cue movement patterns, analyze and modify body mechanics/ergonomics, assess and modify postural abnormalities, address imbalance/dizziness and instruct in home and community integration to attain remaining goals.      [x]  See Plan of Care  []  See progress note/recertification  []  See Discharge Summary         Progress towards goals / Updated goals:  First f/u since evaluation, he reports partial compliance with HEP.      PLAN   []  Upgrade activities as tolerated     [x]  Continue plan of care  []  Update interventions per flow sheet       []  Discharge due to:_  []  Other:_        Luli Lopez 12/28/2020  9:20 AM    Future Appointments   Date Time Provider Darian Julia   12/28/2020  8:30 AM Riana George [facility]   12/30/2020  7:45 AM James HARDEN, PT [facility]   1/6/2021  8:00 AM Irasema Quinn MD [facility] AMB   1/18/2021  9:40 AM Norah Bhatti MD [facility] AMB

## 2020-12-30 ENCOUNTER — APPOINTMENT (OUTPATIENT)
Dept: PHYSICAL THERAPY | Age: 65
End: 2020-12-30
Attending: PHYSICAL MEDICINE & REHABILITATION
Payer: MEDICARE

## 2021-01-05 ENCOUNTER — HOSPITAL ENCOUNTER (OUTPATIENT)
Dept: PHYSICAL THERAPY | Age: 66
Discharge: HOME | End: 2021-01-05
Attending: PHYSICAL MEDICINE & REHABILITATION
Payer: MEDICARE

## 2021-01-05 PROCEDURE — 97110 THERAPEUTIC EXERCISES: CPT

## 2021-01-05 PROCEDURE — 97140 MANUAL THERAPY 1/> REGIONS: CPT

## 2021-01-05 NOTE — PROGRESS NOTES
PHYSICAL THERAPY - DAILY TREATMENT NOTE    Patient Name: Alberta Lombardi. Date: 2021  : 1955   yes Patient  Verified  Visit #:   3   of     Insurance: Payor: Juan J Ramosfer / Plan: [facility] [employer] MEDICARE CHOICE PPO/PFFS / Product Type: Managed Care Medicare /      In time: 8:30 Out time: 9:47   Total Treatment Time: 77     Medicare/BCBS Time Tracking (below)   Total Timed Codes (min):  67 1:1 Treatment Time:  67     TREATMENT AREA =  Lower back pain [M54.5]  Pain in thoracic spine [M54.6]    SUBJECTIVE  Pain Level (on 0 to 10 scale):  6  / 10   Medication Changes/New allergies or changes in medical history, any new surgeries or procedures?    no  If yes, update Summary List   Subjective Functional Status/Changes:  []  No changes reported   My back has mainly just been hurting and really sore in the middle as well as off to both sides from doing so much around the house with putting away the [employer] stuff as well as the weather here lately.            OBJECTIVE  Modalities Rationale:     decrease pain and increase tissue extensibility to improve patient's ability to improve functional abilities   min [] Estim, type/location:                                      []  att     []  unatt     []  w/US     []  w/ice    []  w/heat    min []  Mechanical Traction: type/lbs                   []  pro   []  sup   []  int   []  cont    []  before manual    []  after manual    min []  Ultrasound, settings/location:      min []  Iontophoresis w/ dexamethasone, location:                                               []  take home patch       []  in clinic   10 min []  Ice     [x]  Heat    location/position: Lumbar/prone    min []  Vasopneumatic Device, press/temp:     min []  Other:    [] Skin assessment post-treatment (if applicable):    []  intact    []  redness- no adverse reaction     []redness  adverse reaction:        55 min Therapeutic Exercise:  [x]  See flow sheet   Rationale:      increase ROM and increase strength to improve the patients ability to improve functional abilities      12 min Manual Therapy: STM/tissue mobs to L>R lumbar quadrants in prone   Rationale:      decrease pain, increase ROM, increase tissue extensibility, decrease trigger points and increase postural awareness to improve patient's ability to improve functional mobility   The manual therapy interventions were performed at a separate and distinct time from the therapeutic activities interventions. Billed With/As:   [] TE   [] TA   [] Neuro   [] Self Care Patient Education: [x] Review HEP    [] Progressed/Changed HEP based on:   [] positioning   [] body mechanics   [] transfers   [] heat/ice application    [] other:      Other Objective/Functional Measures:         Post Treatment Pain Level (on 0 to 10) scale:   4  / 10     ASSESSMENT  Assessment/Changes in Function:   Pt presented with chief c/o increased bilateral lumbar quadrant pain/tension from increased repetitive bending/reaching ADL's since last treatment. Pt was able to tolerate full normal therex regiment including addition of level 1 dead bug stabs and increasing to miniband resistance with sidelying clamshells with min to mod challenge today. Pt also presented with increased tissue congestion/trigger points in L inferior lumbar quadrant region in which deep palpation/STM increased L LE radicular pain down to his knee. Will continue to progress/advance patient within current POC as tolerated with monitoring symptoms.      []  See Progress Note/Recertification   Patient will continue to benefit from skilled PT services to modify and progress therapeutic interventions, address functional mobility deficits, address ROM deficits, address strength deficits, analyze and address soft tissue restrictions, analyze and cue movement patterns, analyze and modify body mechanics/ergonomics, assess and modify postural abnormalities and instruct in home and community integration to attain remaining goals. Progress toward goals / Updated goals:  Short Term Goals: To be accomplished in 1 weeks:  1) Goal: Patient will be independent and compliant with HEP in order to progress toward long term goals. 1/5/21: Not Met, Pt admits limited compliance with HEP due to personal issues/medical issues with his wife, importance of HEP was re enforced today  Status at last note/certification: issued and reviewed  [address] be accomplished in 8-12 treatments:  1) Goal: Patient will improve FOTO assessment score to >=53  pts in order to indicate improved functional abilities. Status at last note/certification: 43 pts  2) Goal: Patient will improve LETICIA hip extension to 20 deg for improved posture and gait mechanics  Status at last note/certification: L= 0 deg!, R= -10 deg! 3) Goal: Patient will report worst back pain as 3/10 or less in order to progress toward personal goals. Status at last note/certification: 8/35  4) Goal: Patient will report overall at least 65% improvement in function in order to progress toward premorbid status.   Status at last note/certification: n/a  5) Goal: Patient will report ability to walk for 20 mins with mild and manageable to no pain to care for his dog  Status at last note/certification: 5 mins with pain progressing the longer he walks  6) Goal: Patient will report ability to sit for 1 hour for meals     PLAN  [x]  Upgrade activities as tolerated yes Continue plan of care   []  Discharge due to :    []  Other:      Therapist: Maulik Miller PTA    Date: 1/5/2021 Time: 8:35 AM     Future Appointments   Date Time Provider Darian Dumont   1/6/2021  8:00 AM Dixon Beltrán MD [facility] AMB   1/12/2021  8:30 AM Rosamaria Ill, PTA [facility]   1/18/2021  9:40 AM Karel Matos MD [facility] AMB   1/19/2021  8:30 AM Rosamaria Ill, PTA [facility]   1/26/2021  8:30 AM Rosamaria Ill, PTA [facility]

## 2021-01-12 ENCOUNTER — HOSPITAL ENCOUNTER (OUTPATIENT)
Dept: PHYSICAL THERAPY | Age: 66
Discharge: HOME | End: 2021-01-12
Attending: PHYSICAL MEDICINE & REHABILITATION
Payer: MEDICARE

## 2021-01-12 PROCEDURE — 97140 MANUAL THERAPY 1/> REGIONS: CPT

## 2021-01-12 PROCEDURE — 97110 THERAPEUTIC EXERCISES: CPT

## 2021-01-12 NOTE — PROGRESS NOTES
[facility] [facility] PHYSICAL THERAPY  [address], [place], [address] - Phone: [phone]  Fax: [phone] THERAPY          Patient Name: Rajesh Reese. : 1955   Treatment/Medical Diagnosis: Lower back pain [M54.5]  Pain in thoracic spine [M54.6]   Onset Date:     Referral Source: Nabeel Wood MD Start of Care [facility]): 2020   Prior Hospitalization: See Medical History Provider #: [id number]   Prior Level of Function: I in all ADLs and functional mobility. Able to walk 20 mins without pain, sit for 1 hour, and perform yard work. Comorbidities: Depression, thyroid problems, HTN, visually impaired, hearing impaired, latex allergy, cancer, recent weight change of more than 10 lbs   Medications: Verified on Patient Summary List   Visits from [facility]: 4 Missed Visits: 1     Goal/Measure of Progress Goal Met? 1. Patient will improve FOTO assessment score to >=53  pts in order to indicate improved functional abilities. Status at last Eval: 43/100 Current Status: 45/100 no   2. Patient will improve LETICIA hip extension to 20 deg for improved posture and gait mechanics   Status at last Eval: L= 0 deg!, R= -10 deg! Current Status: Unable to tolerate extension today, exacerbates pain/symptoms no   3. Patient will report worst back pain as 3/10 or less in order to progress toward personal goals. Status at last Eval: 8/10 Current Status: 8/10 at the worst with sit<>stand transfer especially in/out of his truck, lifting and carrying type ADL's as well as with no particular change/increase in activity no     Goal/Measure of Progress Goal Met? 4. Patient will report overall at least 65% improvement in function in order to progress toward premorbid status. Status at last Eval: n/a Current Status: 20% improvement reported no   5.  Patient will report ability to walk for 20 mins with mild and manageable to no pain to care for his dog   Status at last Eval: 5 mins with pain progressing the longer he walks Current Status: 5 mins with pain progressing the longer he walks no   6. Patient will report ability to sit for 1 hour for meals   Status at last Eval: 10-15 minutes Current Status: 30 minutes no     Key Functional Changes/Progress:   Patient reports approximately 20% overall improvement from therapy since initial evaluation with 4-5/10 pain level on average, increased to 8/10 at the worst with sit<>stand transfer especially in/out of his truck, lifting and carrying type ADL's as well as with no particular change/increase in activity. Pt's lack of progress can be attributed to inconsistency with attendance due to personal and other medical issues. Pt stated that he was going to be more compliant with attendance with increasing frequency of visits to 2x/week. Pt will likely benefit from skilled care with consistency. Problem List: pain affecting function, decrease ROM, decrease strength, impaired gait/ balance, decrease ADL/ functional abilitiies, decrease activity tolerance, decrease flexibility/ joint mobility and decrease transfer abilities   Treatment Plan may include any combination of the following: Therapeutic exercise, Therapeutic activities, Neuromuscular re-education, Physical agent/modality, Gait/balance training, Manual therapy, Patient education, Self Care training, Functional mobility training and Home safety training  Patient Goal(s) has been updated and includes:  \"I would like to have less pain in the left side of my lower back and leg\"     Goals for this certification period include and are to be achieved in   4-8  treatments: 1. Patient will improve FOTO assessment score to >=53  pts in order to indicate improved functional abilities. 2.Patient will improve LETICIA hip extension to 20 deg for improved posture and gait mechanics  3. Patient will report worst back pain as 3/10 or less in order to progress toward personal goals. 4.Patient will report overall at least 65% improvement in function in order to progress toward premorbid status. 5.Patient will report ability to walk for 20 mins with mild and manageable to no pain to care for his dog  6. Patient will report ability to sit for 1 hour for meals  Frequency / Duration:   Patient to be seen   1-2   times per week for   4-8    treatments:    Assessments/Recommendations: Continue with current POC for 4-8 remaining visits left on current script with advancing as tolerated, then reassess for the need for continuation or discharge from therapy. If you have any questions/comments please contact us directly at [phone]. Thank you for allowing us to assist in the care of your patient. Therapist Signature: Anibal Lucia DPT Date: 6/66/3031   Certification Period:  Reporting Period: 11/24/2020 - 2/24/2021 11/24/2020 - 1/12/2021 Time: 8:37 AM   NOTE TO PHYSICIAN:  PLEASE COMPLETE THE ORDERS BELOW AND FAX TO   [facility]: [phone]  If you are unable to process this request in 24 hours please contact our office: [phone]    ___ I have read the above report and request that my patient continue as recommended.   ___ I have read the above report and request that my patient continue therapy with the following changes/special instructions: ________________________________________________   ___ I have read the above report and request that my patient be discharged from therapy.      Physician Signature:        Date:       Time:

## 2021-01-12 NOTE — PROGRESS NOTES
PHYSICAL THERAPY - DAILY TREATMENT NOTE    Patient Name: Osmin Chiang. Date: 2021  : 1955   yes Patient  Verified  Visit #:   4   of     Insurance: Payor: Mayank Kathleen / Plan: [facility] [employer] MEDICARE CHOICE PPO/PFFS / Product Type: Managed Care Medicare /      In time: 8:38 Out time: 9:52   Total Treatment Time: 74     Medicare/[facility] Time Tracking (below)   Total Timed Codes (min):  54 1:1 Treatment Time:  54     TREATMENT AREA =  Lower back pain [M54.5]  Pain in thoracic spine [M54.6]    SUBJECTIVE  Pain Level (on 0 to 10 scale):  6  / 10   Medication Changes/New allergies or changes in medical history, any new surgeries or procedures?    no  If yes, update Summary List   Subjective Functional Status/Changes:  []  No changes reported   My back has actually been hurting more since I was here last, I don't know if I did something on my own that caused it or if it was something that I did here or what.          OBJECTIVE  Modalities Rationale:     decrease pain and increase tissue extensibility to improve patient's ability to improve functional abilities   min [] Estim, type/location:                                      []  att     []  unatt     []  w/US     []  w/ice    []  w/heat    min []  Mechanical Traction: type/lbs                   []  pro   []  sup   []  int   []  cont    []  before manual    []  after manual    min []  Ultrasound, settings/location:      min []  Iontophoresis w/ dexamethasone, location:                                               []  take home patch       []  in clinic   20 min []  Ice     [x]  Heat    location/position: Lumbar/supine 10 minutes pre/post treatment    min []  Vasopneumatic Device, press/temp:     min []  Other:    [] Skin assessment post-treatment (if applicable):    []  intact    []  redness- no adverse reaction     []redness  adverse reaction:        44 min Therapeutic Exercise:  [x]  See flow sheet   Rationale:      increase ROM and increase strength to improve the patients ability to improve functional abilities      10 min Manual Therapy: STM/tissue mobs to L>R lumbar quadrants in prone   Rationale:      decrease pain, increase ROM, increase tissue extensibility, decrease trigger points and increase postural awareness to improve patient's ability to improve functional mobility   The manual therapy interventions were performed at a separate and distinct time from the therapeutic activities interventions. Billed With/As:   [] TE   [] TA   [] Neuro   [] Self Care Patient Education: [x] Review HEP    [] Progressed/Changed HEP based on:   [] positioning   [] body mechanics   [] transfers   [] heat/ice application    [] other:      Other Objective/Functional Measures:       Post Treatment Pain Level (on 0 to 10) scale:   6  / 10     ASSESSMENT  Assessment/Changes in Function:   See Progress note/Physician update for full detailed progress towards established goals. [x]  See Progress Note/Recertification   Patient will continue to benefit from skilled PT services to modify and progress therapeutic interventions, address functional mobility deficits, address ROM deficits, address strength deficits, analyze and address soft tissue restrictions, analyze and cue movement patterns, analyze and modify body mechanics/ergonomics, assess and modify postural abnormalities and instruct in home and community integration to attain remaining goals. Progress toward goals / Updated goals:  See Progress note/Physician update for full detailed progress towards established goals.      PLAN  [x]  Upgrade activities as tolerated yes Continue plan of care   []  Discharge due to :    []  Other:      Therapist: Nesha Fabian PTA    Date: 1/12/2021 Time: 8:33 AM     Future Appointments   Date Time Provider Darian Gilliami   1/18/2021  9:40 AM Mark Velazquez MD [facility]   1/19/2021  8:30 AM Craig Raphael PTA [facility]   1/26/2021  8:30 AM Carig Raphael PTA [facility] [id number]

## 2021-01-14 ENCOUNTER — HOSPITAL ENCOUNTER (OUTPATIENT)
Dept: PHYSICAL THERAPY | Age: 66
Discharge: HOME | End: 2021-01-14
Attending: PHYSICAL MEDICINE & REHABILITATION
Payer: MEDICARE

## 2021-01-14 PROCEDURE — 97140 MANUAL THERAPY 1/> REGIONS: CPT

## 2021-01-14 PROCEDURE — 97110 THERAPEUTIC EXERCISES: CPT

## 2021-01-14 NOTE — PROGRESS NOTES
[address] [facility] PHYSICAL THERAPY  [address], [place], [address] - Phone: [phone]  Fax: [phone] THERAPY          Patient Name: Shama Reyes. : 1955   Treatment/Medical Diagnosis: Lower back pain [M54.5]  Pain in thoracic spine [M54.6]   Onset Date: ~May 2020    Referral Source: Yumi Jimenez MD [facility]): 2020   Prior Hospitalization: See Medical History Provider #: [id number]   Prior Level of Function: I w/ all ADLs and functional mobility, able to walk 20 mins w/o pain, sit for 1 hour and perform yard work   Comorbidities: Depression, thyroid problems, HTN, visually impaired, hearing impaired, latex allergy, cancer, recent weight change of more than 10#   Medications: Verified on Patient Summary List   Visits from [facility]: 1 Missed Visits: 1     Key Functional Changes/Progress: Pt attended IE only and has not returned to PT until 2020. Pt cancelled his scheduled ap ton 20 2' undergoing surgical procedure. Pt did not disclose specific procedure, but was advised on need for medical clearance to return to PT, which he provided. Returns to PT w/ no significant change in sx (as expected due to no treatment provided thus far). Pt notes frequent use of spinal cord stimulator for pain modulation; reports w/o stimulator, his pain averages 6/10. Pt challenged w/ most therex and c/o pain w/ core stability exercises, primarily on the L. No progress made towards goals thus far 2' 1st f/u session.      Problem List: pain affecting function, decrease ROM, decrease strength, impaired gait/ balance, decrease ADL/ functional abilitiies, decrease activity tolerance, decrease flexibility/ joint mobility and decrease transfer abilities   Treatment Plan may include any combination of the following: Therapeutic exercise, Therapeutic activities, Neuromuscular re-education, Physical agent/modality, Gait/balance training, Manual therapy, Patient education, Self Care training, Functional mobility training, Home safety training and Stair training  Patient Goal(s) has been updated and includes:      Goals for this certification period include and are to be achieved in   4  weeks:  Short Term Goals: To be accomplished in 1 weeks:  1)  Patient will be independent and compliant with HEP in order to progress toward long term goals. Long Term Goals: To be accomplished in 8-12 treatments:  1) Patient will improve FOTO assessment score to >=53  pts in order to indicate improved functional abilities. 2) Patient will improve LETICIA hip extension to 20 deg for improved posture and gait mechanics  3) Patient will report worst back pain as 3/10 or less in order to progress toward personal goals. 4) Patient will report overall at least 65% improvement in function in order to progress toward premorbid status. 5) Patient will report ability to walk for 20 mins with mild and manageable to no pain to care for his dog  6)  Patient will report ability to sit for 1 hour for meals    Frequency / Duration:   Patient to be seen   1-2   times per week for   4    weeks:    Assessments/Recommendations: Will resume PT at frequency of 1-2x/wk to address impairments and functional limitations outlined on POC. If you have any questions/comments please contact us directly at [phone]. Thank you for allowing us to assist in the care of your patient.     Therapist Signature: Anabela Buchanan PT Date: 03/87/1359   Certification Period:  Reporting Period: 11/24/20 - 2/24/2021 11/24/2020 - 12/28/2021 Time: 3:36 PM   NOTE TO PHYSICIAN:  PLEASE COMPLETE THE ORDERS BELOW AND FAX TO   [facility]: [phone]  If you are unable to process this request in 24 hours please contact our office: [phone]    ___ I have read the above report and request that my patient continue as recommended.   ___ I have read the above report and request that my patient continue therapy with the following changes/special instructions: ________________________________________________   ___ I have read the above report and request that my patient be discharged from therapy.      Physician Signature:        Date:       Time:

## 2021-01-14 NOTE — PROGRESS NOTES
PHYSICAL THERAPY - DAILY TREATMENT NOTE    Patient Name: Misael Peters. Date: 2021  : 1955   yes Patient  Verified  Visit #:   5     Insurance: Payor: Jyoti Juliette / Plan: [facility] HUMANA MEDICARE CHOICE PPO/PFFS / Product Type: Managed Care Medicare /      In time: 9:00 Out time: 9:56   Total Treatment Time: 56     Medicare/[employer] Time Tracking (below)   Total Timed Codes (min):  46 1:1 Treatment Time:  46     TREATMENT AREA =  Lower back pain [M54.5]  Pain in thoracic spine [M54.6]    SUBJECTIVE  Pain Level (on 0 to 10 scale):  6  / 10   Medication Changes/New allergies or changes in medical history, any new surgeries or procedures?    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 \"  Pt states he has done \"some\" of his HEP; doesn't notice a change to his back pain afterwards. \"I feel like it strengthens me a little bit\". C/o pain across low back and down L>RLE. Will see neurosurgeon at the end of this month.          OBJECTIVE  Modalities Rationale:     decrease pain and increase tissue extensibility to improve patient's ability to improve functional abilities   min [] Estim, type/location:                                      []  att     []  unatt     []  w/US     []  w/ice    []  w/heat    min []  Mechanical Traction: type/lbs                   []  pro   []  sup   []  int   []  cont    []  before manual    []  after manual    min []  Ultrasound, settings/location:      min []  Iontophoresis w/ dexamethasone, location:                                               []  take home patch       []  in clinic   10 min []  Ice     [x]  Heat    location/position: Lumbar/supine 10 minutes post treatment    min []  Vasopneumatic Device, press/temp:     min []  Other:    [] Skin assessment post-treatment (if applicable): []  intact    []  redness- no adverse reaction     []redness  adverse reaction:        30 min Therapeutic Exercise:  [x]  See flow sheet   Rationale:      increase ROM and increase strength to improve the patients ability to improve functional abilities      16 min Manual Therapy: STM/tissue mobs to L>R lumbar quadrants in prone. MET for pubic clearing and L post rotated innominate   Rationale:      decrease pain, increase ROM, increase tissue extensibility, decrease trigger points and increase postural awareness to improve patient's ability to improve functional mobility   The manual therapy interventions were performed at a separate and distinct time from the therapeutic activities interventions. Billed With/As:   [x] TE   [] TA   [] Neuro   [] Self Care Patient Education: [x] Review HEP    [] Progressed/Changed HEP based on:   [] positioning   [] body mechanics   [] transfers   [] heat/ice application    [] other:      Other Objective/Functional Measures:    -attempted prone hip ext; increased LBP and modified to prone TKA with TA/GS co-contraction; pt able to tolerate limited reps before stopped due to LBP. Post Treatment Pain Level (on 0 to 10) scale:   7  / 10     ASSESSMENT  Assessment/Changes in Function:   Pain primarily to left SI and low back today; limits tolerance for exercises; pt reported improved after manual therapy, however on re-attempt for SKC/dead bug, still limited by pain and unable to complete full reps.   Pt ed on need for consistent attendance and HEP compliance to determine if benefit from PT; plan to continue monitoring treatment response over next 2 visits and if no improvement may need to refer back to MD.     [x]  See Progress Note/Recertification   Patient will continue to benefit from skilled PT services to modify and progress therapeutic interventions, address functional mobility deficits, address ROM deficits, address strength deficits, analyze and address soft tissue restrictions, analyze and cue movement patterns, analyze and modify body mechanics/ergonomics, assess and modify postural abnormalities and instruct in home and community integration to attain remaining goals. Progress toward goals / Updated goals:  1st f/u after PN; no sigificant changes yet. PLAN  [x]  Upgrade activities as tolerated yes Continue plan of care   []  Discharge due to :    []  Other:      Therapist: Dominic Sanchez.  Ward Fletcher, PT    Date: 1/14/2021 Time: 9:56 AM      Future Appointments   Date Time Provider Darian Dumont   1/14/2021  9:15 AM Owen Fernandez, PT [facility]   1/18/2021  9:40 AM Sukh Arias MD [facility]   1/19/2021  8:30 AM Jeffrey Lopes, PTA [facility]   1/21/2021 11:30 AM Jeffrey Lopes, PTA [facility]   1/22/2021  9:00 AM [facility]   1/26/2021  8:30 AM Jeffrey Lopes, PTA [facility]   1/28/2021  9:15 AM Jeffrey Lopes, PTA [facility]

## 2021-01-19 ENCOUNTER — HOSPITAL ENCOUNTER (OUTPATIENT)
Dept: PHYSICAL THERAPY | Age: 66
Discharge: HOME | End: 2021-01-19
Attending: PHYSICAL MEDICINE & REHABILITATION
Payer: MEDICARE

## 2021-01-19 PROCEDURE — 97140 MANUAL THERAPY 1/> REGIONS: CPT

## 2021-01-19 PROCEDURE — 97110 THERAPEUTIC EXERCISES: CPT

## 2021-01-19 NOTE — PROGRESS NOTES
PHYSICAL THERAPY - DAILY TREATMENT NOTE    Patient Name: Garrett Nesbitt. Date: 2021  : 1955   yes Patient  Verified  Visit #:   6     Insurance: Payor: Francois Sosa / Plan: [facility] HUMANA MEDICARE CHOICE PPO/PFFS / Product Type: Managed Care Medicare /      In time: 8:30 Out time: 9:52   Total Treatment Time: 82     Medicare/[facility] Time Tracking (below)   Total Timed Codes (min):  72 1:1 Treatment Time:  62     TREATMENT AREA =  Lower back pain [M54.5]  Pain in thoracic spine [M54.6]    SUBJECTIVE  Pain Level (on 0 to 10 scale):  5  / 10   Medication Changes/New allergies or changes in medical history, any new surgeries or procedures?    no  If yes, update Summary List   Subjective Functional Status/Changes:  []  No changes reported   My back feels about the same as the last few times that I have been coming in, but I think that the weather has something to do with it too and I can't stand to lay on my left side because of the pain in my hip.           OBJECTIVE  Modalities Rationale:     decrease pain and increase tissue extensibility to improve patient's ability to improve functional abilities   min [] Estim, type/location:                                      []  att     []  unatt     []  w/US     []  w/ice    []  w/heat    min []  Mechanical Traction: type/lbs                   []  pro   []  sup   []  int   []  cont    []  before manual    []  after manual    min []  Ultrasound, settings/location:      min []  Iontophoresis w/ dexamethasone, location:                                               []  take home patch       []  in clinic   10 min []  Ice     [x]  Heat    location/position: Lumbar/prone    min []  Vasopneumatic Device, press/temp:     min []  Other:    [] Skin assessment post-treatment (if applicable):    []  intact    []  redness- no adverse reaction     []redness  adverse reaction:        62 min Therapeutic Exercise:  [x]  See flow sheet   Rationale:      increase ROM and increase strength to improve the patients ability to improve functional abilities      10 min Manual Therapy: STM/TPR/tissue mobs to L>R lumbar quadrant in prone   Rationale:      decrease pain, increase ROM, increase tissue extensibility, decrease trigger points and increase postural awareness to improve patient's ability to improve functional mobility   The manual therapy interventions were performed at a separate and distinct time from the therapeutic activities interventions. Billed With/As:   [] TE   [] TA   [] Neuro   [] Self Care Patient Education: [x] Review HEP    [] Progressed/Changed HEP based on:   [] positioning   [] body mechanics   [] transfers   [] heat/ice application    [] other:      Other Objective/Functional Measures:       Post Treatment Pain Level (on 0 to 10) scale:   4  / 10     ASSESSMENT  Assessment/Changes in Function:   Pt was able to tolerate increased LE involved strengthening/stretching therex with minimal aggravation of left lumbar quadrant/LE pain/symptoms today. Pt demonstrated increased exercise and manual intervention tolerance overall compared to the past few treatments. Will continue to progress/advance patient within current POC as tolerated with monitoring symptoms. []  See Progress Note/Recertification   Patient will continue to benefit from skilled PT services to modify and progress therapeutic interventions, address functional mobility deficits, address ROM deficits, address strength deficits, analyze and address soft tissue restrictions, analyze and cue movement patterns, analyze and modify body mechanics/ergonomics, assess and modify postural abnormalities and instruct in home and community integration to attain remaining goals. Progress toward goals / Updated goals:  · Goals for this certification period include and are to be achieved in   4-8  treatments: 1. Patient will improve FOTO assessment score to >=53  pts in order to indicate improved functional abilities. 2.Patient will improve LETICIA hip extension to 20 deg for improved posture and gait mechanics  3. Patient will report worst back pain as 3/10 or less in order to progress toward personal goals. 4.Patient will report overall at least 65% improvement in function in order to progress toward premorbid status. 5.Patient will report ability to walk for 20 mins with mild and manageable to no pain to care for his dog  6. Patient will report ability to sit for 1 hour for meals     PLAN  [x]  Upgrade activities as tolerated yes Continue plan of care   []  Discharge due to :    []  Other:      Therapist: Jodi Muse PTA    Date: 1/19/2021 Time: 8:36 AM     Future Appointments   Date Time Provider Darian Dumont   1/21/2021 11:30 AM Guerda Chiang PTA [facility]   1/22/2021  9:00 AM [address]   1/26/2021  8:30 AM Guerda Chiang PTA [facility]   1/28/2021  9:15 AM Guerda Chiang PTA [facility]

## 2021-01-21 ENCOUNTER — HOSPITAL ENCOUNTER (OUTPATIENT)
Dept: PHYSICAL THERAPY | Age: 66
Discharge: HOME | End: 2021-01-21
Attending: PHYSICAL MEDICINE & REHABILITATION
Payer: MEDICARE

## 2021-01-21 PROCEDURE — 97110 THERAPEUTIC EXERCISES: CPT

## 2021-01-21 PROCEDURE — 97140 MANUAL THERAPY 1/> REGIONS: CPT

## 2021-01-21 NOTE — PROGRESS NOTES
PHYSICAL THERAPY - DAILY TREATMENT NOTE    Patient Name: Naseem Doyle. Date: 2021  : 1955   yes Patient  Verified  Visit #:     Insurance: Payor: Hal Hamilton / Plan: [facility] HUMAN MEDICARE CHOICE PPO/PFFS / Product Type: Managed Care Medicare /      In time: 11:32 Out time: 12:27   Total Treatment Time: 55     Medicare/[facility] Time Tracking (below)   Total Timed Codes (min):  45 1:1 Treatment Time:  45     TREATMENT AREA =  Lower back pain [M54.5]  Pain in thoracic spine [M54.6]    SUBJECTIVE  Pain Level (on 0 to 10 scale):  4  / 10   Medication Changes/New allergies or changes in medical history, any new surgeries or procedures?    no  If yes, update Summary List   Subjective Functional Status/Changes:  []  No changes reported   My back has actually been feeling a little bit better since I was here last, but I think that it's always going to hurt no matter what.            OBJECTIVE  Modalities Rationale:     decrease pain and increase tissue extensibility to improve patient's ability to improve functional abilities   min [] Estim, type/location:                                      []  att     []  unatt     []  w/US     []  w/ice    []  w/heat    min []  Mechanical Traction: type/lbs                   []  pro   []  sup   []  int   []  cont    []  before manual    []  after manual    min []  Ultrasound, settings/location:      min []  Iontophoresis w/ dexamethasone, location:                                               []  take home patch       []  in clinic   10 min []  Ice     [x]  Heat    location/position: Lumbar/prone    min []  Vasopneumatic Device, press/temp:     min []  Other:    [] Skin assessment post-treatment (if applicable):    []  intact    []  redness- no adverse reaction     []redness  adverse reaction:        37 min Therapeutic Exercise:  [x]  See flow sheet   Rationale:      increase ROM and increase strength to improve the patients ability to improve functional abilities     8 min Manual Therapy: STM/tissue mobs to R>L lumbar quadrants in prone   Rationale:      decrease pain, increase ROM, increase tissue extensibility, decrease trigger points and increase postural awareness to improve patient's ability to improve functional mobility  The manual therapy interventions were performed at a separate and distinct time from the therapeutic activities interventions. Billed With/As:   [] TE   [] TA   [] Neuro   [] Self Care Patient Education: [x] Review HEP    [] Progressed/Changed HEP based on:   [] positioning   [] body mechanics   [] transfers   [] heat/ice application    [] other:      Other Objective/Functional Measures:       Post Treatment Pain Level (on 0 to 10) scale:   4  / 10     ASSESSMENT  Assessment/Changes in Function:   Pt presents with diminished pain/symptoms overall over the past 2 treatments, but chief findings of R>L lumbar quadrant tension/trigger points with manual intervention today. (L>R over the past several visits) Pt was given and instructed on updated HEP today as well. Will continue to progress/advance patient within current POC as tolerated with monitoring symptoms. []  See Progress Note/Recertification   Patient will continue to benefit from skilled PT services to modify and progress therapeutic interventions, address functional mobility deficits, address ROM deficits, address strength deficits, analyze and address soft tissue restrictions, analyze and cue movement patterns, analyze and modify body mechanics/ergonomics, assess and modify postural abnormalities and instruct in home and community integration to attain remaining goals. Progress toward goals / Updated goals:  · Goals for this certification period include and are to be achieved in   4-8  treatments: 1. Patient will improve FOTO assessment score to >=53  pts in order to indicate improved functional abilities.   2.Patient will improve LETICIA hip extension to 20 deg for improved posture and gait mechanics  3. Patient will report worst back pain as 3/10 or less in order to progress toward personal goals. 1/21/21: Not Met, Improved, but pt reports 4-5/10 pain level over the past 2 visits  4. Patient will report overall at least 65% improvement in function in order to progress toward premorbid status. 5.Patient will report ability to walk for 20 mins with mild and manageable to no pain to care for his dog  6. Patient will report ability to sit for 1 hour for meals     PLAN  [x]  Upgrade activities as tolerated yes Continue plan of care   []  Discharge due to :    []  Other:      Therapist: Charu Mclaughlin PTA    Date: 1/21/2021 Time: 11:35 AM     Future Appointments   Date Time Provider Darian Dumont   1/22/2021  9:00 AM [facility] [facility]   1/26/2021  8:30 AM Maximilian Calderon PTA [facility]   1/28/2021  9:15 AM Maximilian Calderon PTA [facility]

## 2021-01-26 ENCOUNTER — HOSPITAL ENCOUNTER (OUTPATIENT)
Dept: PHYSICAL THERAPY | Age: 66
Discharge: HOME | End: 2021-01-26
Attending: PHYSICAL MEDICINE & REHABILITATION
Payer: MEDICARE

## 2021-01-26 PROCEDURE — 97140 MANUAL THERAPY 1/> REGIONS: CPT

## 2021-01-26 PROCEDURE — 97110 THERAPEUTIC EXERCISES: CPT

## 2021-01-26 NOTE — PROGRESS NOTES
PHYSICAL THERAPY - DAILY TREATMENT NOTE    Patient Name: Michele Thomas. Date: 2021  : 1955   yes Patient  Verified  Visit #:   8     Insurance: Payor: Ezra Metcalf / Plan: [facility] [employer] MEDICARE CHOICE PPO/PFFS / Product Type: Managed Care Medicare /      In time: 8:30 Out time: 9:35   Total Treatment Time: 65     Medicare/[facility] Time Tracking (below)   Total Timed Codes (min):  55 1:1 Treatment Time:  45     TREATMENT AREA =  Lower back pain [M54.5]  Pain in thoracic spine [M54.6]    SUBJECTIVE  Pain Level (on 0 to 10 scale):  4- 10   Medication Changes/New allergies or changes in medical history, any new surgeries or procedures?    no  If yes, update Summary List   Subjective Functional Status/Changes:  []  No changes reported   I went to see a different doctor and he wants to do an injection on Thursday afternoon in that one spot where you have been working on to see if it well help as well as the possibility of fusing that joint near my sciatic nerve if that doesn't work.           OBJECTIVE  Modalities Rationale:     decrease pain and increase tissue extensibility to improve patient's ability to improve functional abilities   min [] Estim, type/location:                                      []  att     []  unatt     []  w/US     []  w/ice    []  w/heat    min []  Mechanical Traction: type/lbs                   []  pro   []  sup   []  int   []  cont    []  before manual    []  after manual    min []  Ultrasound, settings/location:      min []  Iontophoresis w/ dexamethasone, location:                                               []  take home patch       []  in clinic   10 min []  Ice     []  Heat    location/position:     min []  Vasopneumatic Device, press/temp:     min []  Other:    [] Skin assessment post-treatment (if applicable):    []  intact    []  redness- no adverse reaction     []redness  adverse reaction:        45 min Therapeutic Exercise:  [x]  See flow sheet Rationale:      increase ROM and increase strength to improve the patients ability to improve functional abilities      10 min Manual Therapy: STM/tissue mobs to R>L lumbar quadrants in prone   Rationale:      decrease pain, increase ROM, increase tissue extensibility, decrease trigger points and increase postural awareness to improve patient's ability to improve functional mobility   The manual therapy interventions were performed at a separate and distinct time from the therapeutic activities interventions. Billed With/As:   [] TE   [] TA   [] Neuro   [] Self Care Patient Education: [x] Review HEP    [] Progressed/Changed HEP based on:   [] positioning   [] body mechanics   [] transfers   [] heat/ice application    [] other:      Other Objective/Functional Measures:         Post Treatment Pain Level (on 0 to 10) scale:   6  / 10     ASSESSMENT  Assessment/Changes in Function:   Patient reports approximately 40% overall improvement from therapy since initial evaluation. Pt also reports that he is scheduled for an injection on Thursday 1/28/21 as well as exploring other more invasive options due to limited/slow prognosis. Will continue to progress/advance patient within current POC as tolerated with monitoring symptoms. []  See Progress Note/Recertification   Patient will continue to benefit from skilled PT services to modify and progress therapeutic interventions, address functional mobility deficits, address ROM deficits, address strength deficits, analyze and address soft tissue restrictions, analyze and cue movement patterns, analyze and modify body mechanics/ergonomics, assess and modify postural abnormalities and instruct in home and community integration to attain remaining goals. Progress toward goals / Updated goals:  · Goals for this certification period include and are to be achieved in   4-8  treatments: 1. Patient will improve FOTO assessment score to >=53  pts in order to indicate improved functional abilities. 2.Patient will improve LETICIA hip extension to 20 deg for improved posture and gait mechanics  3. Patient will report worst back pain as 3/10 or less in order to progress toward personal goals. 1/21/21: Not Met, Improved, but pt reports 4-5/10 pain level over the past 2 visits  4. Patient will report overall at least 65% improvement in function in order to progress toward premorbid status. 1/26/21: Not Met, Pt reports approximately 40% overall improvement since initial evaluation   5. Patient will report ability to walk for 20 mins with mild and manageable to no pain to care for his dog  6. Patient will report ability to sit for 1 hour for meals     PLAN  [x]  Upgrade activities as tolerated yes Continue plan of care   []  Discharge due to :    []  Other:      Therapist: Aden Parnell PTA    Date: 1/26/2021 Time: 8:37 AM     Future Appointments   Date Time Provider Darian Dumont   1/28/2021  9:15 AM Asiya Collazo PTA [facility]

## 2021-01-28 ENCOUNTER — HOSPITAL ENCOUNTER (OUTPATIENT)
Dept: PHYSICAL THERAPY | Age: 66
Discharge: HOME | End: 2021-01-28
Attending: PHYSICAL MEDICINE & REHABILITATION
Payer: MEDICARE

## 2021-01-28 PROCEDURE — 97110 THERAPEUTIC EXERCISES: CPT

## 2021-01-28 PROCEDURE — 97140 MANUAL THERAPY 1/> REGIONS: CPT

## 2021-01-28 NOTE — PROGRESS NOTES
PHYSICAL THERAPY - DAILY TREATMENT NOTE    Patient Name: Regis Andres Jr.        Date: 2021  : 1955   yes Patient  Verified  Visit #:     Insurance: Payor: [employer] MEDICARE / [facility]/PFFS / Product Type: Managed Care Medicare /      In time: 9:13 Out time: 10:12   Total Treatment Time: 59     Medicare/[facility] Time Tracking (below)   Total Timed Codes (min):  49 1:1 Treatment Time:  49     TREATMENT AREA =  Lower back pain [M54.5]  Pain in thoracic spine [M54.6]    SUBJECTIVE  Pain Level (on 0 to 10 scale):  4  / 10   Medication Changes/New allergies or changes in medical history, any new surgeries or procedures?    no  If yes, update Summary List   Subjective Functional Status/Changes:  []  No changes reported   My sciatica has been bothering me more when I work my back and leg out with certain exercises, but I have a virtual meeting to talk about setting up the injection and the possibility of surgery after that to fuse the joint.          OBJECTIVE  Modalities Rationale:     decrease pain and increase tissue extensibility to improve patient's ability to improve functional abilities    min [] Estim, type/location:                                      []  att     []  unatt     []  w/US     []  w/ice    []  w/heat    min []  Mechanical Traction: type/lbs                   []  pro   []  sup   []  int   []  cont    []  before manual    []  after manual    min []  Ultrasound, settings/location:      min []  Iontophoresis w/ dexamethasone, location:                                               []  take home patch       []  in clinic   10 min []  Ice     [x]  Heat    location/position: Lumbar/prone    min []  Vasopneumatic Device, press/temp:     min []  Other:    [] Skin assessment post-treatment (if applicable):    []  intact    []  redness- no adverse reaction     []redness – adverse reaction:        39 min Therapeutic Exercise:  [x]  See flow sheet   Rationale:   increase ROM and increase strength to improve the patients ability to improve functional abilities     10 min Manual Therapy: STM/tissue mobs to R>L lumbar quadrants in prone   Rationale:      decrease pain, increase ROM, increase tissue extensibility, decrease trigger points and increase postural awareness to improve patient's ability to improve functional mobility   The manual therapy interventions were performed at a separate and distinct time from the therapeutic activities interventions. Billed With/As:   [] TE   [] TA   [] Neuro   [] Self Care Patient Education: [x] Review HEP    [] Progressed/Changed HEP based on:   [] positioning   [] body mechanics   [] transfers   [] heat/ice application    [] other:      Other Objective/Functional Measures:       Post Treatment Pain Level (on 0 to 10) scale:   6  / 10     ASSESSMENT  Assessment/Changes in Function:   Pt presented with chief c/o increased intermittent L LE radicular sciatic nerve distribution pain with various exercises during therex today with no consistent movement pattern, but was able to perform full normal therex regiment with moderate challenge today. Pt also reports that she is consulting with MD today via teleconference about possible injection and surgical intervention. Will continue to progress/advance patient within current POC as tolerated with monitoring symptoms. []  See Progress Note/Recertification   Patient will continue to benefit from skilled PT services to modify and progress therapeutic interventions, address functional mobility deficits, address ROM deficits, address strength deficits, analyze and address soft tissue restrictions, analyze and cue movement patterns, analyze and modify body mechanics/ergonomics, assess and modify postural abnormalities and instruct in home and community integration to attain remaining goals.    Progress toward goals / Updated goals:  · Goals for this certification period include and are to be achieved in   4-8  treatments: 1. Patient will improve FOTO assessment score to >=53  pts in order to indicate improved functional abilities. 2.Patient will improve LETICIA hip extension to 20 deg for improved posture and gait mechanics  3. Patient will report worst back pain as 3/10 or less in order to progress toward personal goals. 1/21/21: Not Met, Improved, but pt reports 4-5/10 pain level over the past 2 visits  4. Patient will report overall at least 65% improvement in function in order to progress toward premorbid status. 1/26/21: Not Met, Pt reports approximately 40% overall improvement since initial evaluation   5. Patient will report ability to walk for 20 mins with mild and manageable to no pain to care for his dog  6. Patient will report ability to sit for 1 hour for meals     PLAN  [x]  Upgrade activities as tolerated yes Continue plan of care   []  Discharge due to :    []  Other:      Therapist: Emmanuel Ryan PTA    Date: 1/28/2021 Time: 9:48 AM     Future Appointments   Date Time Provider Darian Dumont   2/2/2021  9:00 AM Zach Schwab., PT [facility]   2/4/2021 11:00 AM Zach Schwab., PT [facility]

## 2021-02-02 ENCOUNTER — APPOINTMENT (OUTPATIENT)
Dept: PHYSICAL THERAPY | Age: 66
End: 2021-02-02
Attending: PHYSICAL MEDICINE & REHABILITATION
Payer: MEDICARE

## 2021-02-04 ENCOUNTER — HOSPITAL ENCOUNTER (OUTPATIENT)
Dept: PHYSICAL THERAPY | Age: 66
Discharge: HOME | End: 2021-02-04
Attending: PHYSICAL MEDICINE & REHABILITATION
Payer: MEDICARE

## 2021-02-04 PROCEDURE — 97140 MANUAL THERAPY 1/> REGIONS: CPT

## 2021-02-04 PROCEDURE — 97110 THERAPEUTIC EXERCISES: CPT

## 2021-02-04 NOTE — PROGRESS NOTES
PHYSICAL THERAPY - DAILY TREATMENT NOTE    Patient Name: Qian Paredes. Date: 2021  : 1955   yes Patient  Verified  Visit #:   10     Insurance: Payor: Jeffery Martinez / Plan: [facility] [employer] MEDICARE CHOICE PPO/PFFS / Product Type: Managed Care Medicare /      In time: 10:57 Out time: 11:58   Total Treatment Time: 61     Medicare/[facility] Time Tracking (below)   Total Timed Codes (min):  51 1:1 Treatment Time:  51     TREATMENT AREA =  Lower back pain [M54.5]  Pain in thoracic spine [M54.6]    SUBJECTIVE  Pain Level (on 0 to 10 scale):  4  / 10   Medication Changes/New allergies or changes in medical history, any new surgeries or procedures?    no  If yes, update Summary List   Subjective Functional Status/Changes:  []  No changes reported     Pt c/o \"burning and a pulling\" to Left great toe when bending over to put on socks.        OBJECTIVE  Modalities Rationale:     decrease pain and increase tissue extensibility to improve patient's ability to improve functional abilities    min [] Estim, type/location:                                      []  att     []  unatt     []  w/US     []  w/ice    []  w/heat    min []  Mechanical Traction: type/lbs                   []  pro   []  sup   []  int   []  cont    []  before manual    []  after manual    min []  Ultrasound, settings/location:      min []  Iontophoresis w/ dexamethasone, location:                                               []  take home patch       []  in clinic   10 min []  Ice     [x]  Heat    location/position: Lumbar/prone    min []  Vasopneumatic Device, press/temp:     min []  Other:    [] Skin assessment post-treatment (if applicable):    []  intact    []  redness- no adverse reaction     []redness  adverse reaction:        39 min Therapeutic Exercise:  [x]  See flow sheet   Rationale:      increase ROM and increase strength to improve the patients ability to improve functional abilities     12 min Manual Therapy: STM/tissue mobs to R>L lumbar quadrants in prone. L sciatic nerve gliding with tibial nerve bias to address c/o radicular foot sx's with combined PF and lumbar flexion activity. Rationale:      decrease pain, increase ROM, increase tissue extensibility, decrease trigger points and increase postural awareness to improve patient's ability to improve functional mobility   The manual therapy interventions were performed at a separate and distinct time from the therapeutic activities interventions. Billed With/As:   [x] TE   [] TA   [] Neuro   [] Self Care Patient Education: [x] Review HEP    [] Progressed/Changed HEP based on:   [] positioning   [] body mechanics   [] transfers   [] heat/ice application    [] other:      Other Objective/Functional Measures:    -attempted standing squats/wall squats, however pt c/o L knee pain and regressed back to TG with band for increased glute activation.  -band walks progressed to GMB with pt c/o slight increase LBP with ~ 5 feet til finish.  -added strap hamstring stretches to address significant tightness  -c/o L leg numbness and LBP with dead bugs. Post Treatment Pain Level (on 0 to 10) scale:   6  / 10     ASSESSMENT  Assessment/Changes in Function:   Pt with limited tolerance for exercises today due to irritable L back/sciatic sx's. Exercises were held when noted to be causing pain, however pt reporting overall increased pain on leaving session (MHP did help reduce pain level). Pt ed on recommendation to only perform HEP to tolerance as some days he may experience pain differently than others. Plan on d/c in next 2 visits.      []  See Progress Note/Recertification   Patient will continue to benefit from skilled PT services to modify and progress therapeutic interventions, address functional mobility deficits, address ROM deficits, address strength deficits, analyze and address soft tissue restrictions, analyze and cue movement patterns, analyze and modify body mechanics/ergonomics, assess and modify postural abnormalities and instruct in home and community integration to attain remaining goals. Progress toward goals / Updated goals:    · Goals for this certification period include and are to be achieved in   4-8  treatments: 1. Patient will improve FOTO assessment score to >=53  pts in order to indicate improved functional abilities. 2.Patient will improve LETICIA hip extension to 20 deg for improved posture and gait mechanics  3. Patient will report worst back pain as 3/10 or less in order to progress toward personal goals. 1/21/21: Not Met, Improved, but pt reports 4-5/10 pain level over the past 2 visits  -2/4: not met; pain up to 6/10 today  4. Patient will report overall at least 65% improvement in function in order to progress toward premorbid status. 1/26/21: Not Met, Pt reports approximately 40% overall improvement since initial evaluation   5. Patient will report ability to walk for 20 mins with mild and manageable to no pain to care for his dog  6. Patient will report ability to sit for 1 hour for meals     PLAN  [x]  Upgrade activities as tolerated yes Continue plan of care   []  Discharge due to :    []  Other:      Therapist: Ponce Henderson.  Nicki Garner, CATALINO    Date: 2/4/2021 Time: 12:08 PM      Future Appointments   Date Time Provider Darian Dumont   2/8/2021 11:15 AM Althia Prior, PTA [facility]   2/11/2021  8:30 AM Althia Prior, PTA [facility]   2/26/2021  1:45 PM Fer Jimenes MD [facility]

## 2021-02-05 ENCOUNTER — TELEPHONE (OUTPATIENT)
Dept: CARDIOLOGY CLINIC | Age: 66
End: 2021-02-05

## 2021-02-05 NOTE — TELEPHONE ENCOUNTER
Called to let [facility] is to [facility] Elq[facility] 2 days prior to injections and resumes post injections. No answer, left message to call back when possible.

## 2021-02-08 ENCOUNTER — HOSPITAL ENCOUNTER (OUTPATIENT)
Dept: PHYSICAL THERAPY | Age: 66
Discharge: HOME | End: 2021-02-08
Attending: PHYSICAL MEDICINE & REHABILITATION
Payer: MEDICARE

## 2021-02-08 ENCOUNTER — TELEPHONE (OUTPATIENT)
Dept: CARDIOLOGY CLINIC | Age: 66
End: 2021-02-08

## 2021-02-08 PROCEDURE — 97110 THERAPEUTIC EXERCISES: CPT

## 2021-02-08 PROCEDURE — 97140 MANUAL THERAPY 1/> REGIONS: CPT

## 2021-02-08 NOTE — PROGRESS NOTES
PHYSICAL THERAPY - DAILY TREATMENT NOTE    Patient Name: Wen Dailey. Date: 2021  : 1955   yes Patient  Verified  Visit #:     Insurance: Payor: Dru Lamb / Plan: [facility] HUMAN MEDICARE CHOICE PPO/PFFS / Product Type: Managed Care Medicare /      In time: 11:14 Out time: 12:10   Total Treatment Time: 56     Medicare/[facility] Time Tracking (below)   Total Timed Codes (min):  46 1:1 Treatment Time:  46     TREATMENT AREA =  Lower back pain [M54.5]  Pain in thoracic spine [M54.6]    SUBJECTIVE  Pain Level (on 0 to 10 scale):  6  / 10   Medication Changes/New allergies or changes in medical history, any new surgeries or procedures?    no  If yes, update Summary List   Subjective Functional Status/Changes:  []  No changes reported   I am scheduled to have injections in my sciatic nerve as well as the hip bursa to see if that will help, but my last scheduled appointment is this coming Thursday.           OBJECTIVE  Modalities Rationale:     decrease pain and increase tissue extensibility to improve patient's ability to improve functional abilities    min [] Estim, type/location:                                      []  att     []  unatt     []  w/US     []  w/ice    []  w/heat    min []  Mechanical Traction: type/lbs                   []  pro   []  sup   []  int   []  cont    []  before manual    []  after manual    min []  Ultrasound, settings/location:      min []  Iontophoresis w/ dexamethasone, location:                                               []  take home patch       []  in clinic   10 min []  Ice     [x]  Heat    location/position: Lumbar/prone    min []  Vasopneumatic Device, press/temp:     min []  Other:    [] Skin assessment post-treatment (if applicable):    []  intact    []  redness- no adverse reaction     []redness  adverse reaction:        36 min Therapeutic Exercise:  [x]  See flow sheet   Rationale:      increase ROM and increase strength to improve the patients ability to improve functional abilities      10 min Manual Therapy: STM/tissue mobs to R>L lumbar quadrants in prone   Rationale:      decrease pain, increase ROM, increase tissue extensibility, decrease trigger points and increase postural awareness to improve patient's ability to improve functional mobility   The manual therapy interventions were performed at a separate and distinct time from the therapeutic activities interventions. Billed With/As:   [] TE   [] TA   [] Neuro   [] Self Care Patient Education: [x] Review HEP    [] Progressed/Changed HEP based on:   [] positioning   [] body mechanics   [] transfers   [] heat/ice application    [] other:      Other Objective/Functional Measures:       Post Treatment Pain Level (on 0 to 10) scale:   3-4  / 10     ASSESSMENT  Assessment/Changes in Function:   Pt is near maximum medical benefit/potential from current POC and is scheduled to have injections in L glut and hip on 2/24/21 to help remedy sciatic nerve symptoms. Pt was able to tolerate increased reps with level 1 dead bug stabs, but had to decrease reps with mini band side steps due to exacerbation of symptoms today. Will review detailed progress/LTGs for planned discharge next visit. []  See Progress Note/Recertification   Patient will continue to benefit from skilled PT services to modify and progress therapeutic interventions, address functional mobility deficits, address ROM deficits, address strength deficits, analyze and address soft tissue restrictions, analyze and cue movement patterns, analyze and modify body mechanics/ergonomics, assess and modify postural abnormalities and instruct in home and community integration to attain remaining goals. Progress toward goals / Updated goals:  · Goals for this certification period include and are to be achieved in   4-8  treatments: 1. Patient will improve FOTO assessment score to >=53  pts in order to indicate improved functional abilities. 2.Patient will improve LETICIA hip extension to 20 deg for improved posture and gait mechanics  3. Patient will report worst back pain as 3/10 or less in order to progress toward personal goals. 1/21/21: Not Met, Improved, but pt reports 4-5/10 pain level over the past 2 visits  -2/4: not met; pain up to 6/10 today  4. Patient will report overall at least 65% improvement in function in order to progress toward premorbid status. 1/26/21: Not Met, Pt reports approximately 40% overall improvement since initial evaluation   5. Patient will report ability to walk for 20 mins with mild and manageable to no pain to care for his dog  6. Patient will report ability to sit for 1 hour for meals     PLAN  [x]  Upgrade activities as tolerated yes Continue plan of care   []  Discharge due to :    []  Other:      Therapist: Nesha Fabian PTA    Date: 2/8/2021 Time: 11:29 AM     Future Appointments   Date Time Provider Darian Dumont   2/11/2021  8:30 AM Craig Raphael PTA [facility]   2/26/2021  1:45 PM Denman Najjar, MD [facility]

## 2021-02-08 NOTE — TELEPHONE ENCOUNTER
Called patient and let know per written message from NP [facility], is to hold ELiquis 2 days prior to hip injections and resume post op. Patient verbalized understanding and had no further questions.

## 2021-02-11 ENCOUNTER — HOSPITAL ENCOUNTER (OUTPATIENT)
Dept: PHYSICAL THERAPY | Age: 66
Discharge: HOME | End: 2021-02-11
Attending: PHYSICAL MEDICINE & REHABILITATION
Payer: MEDICARE

## 2021-02-11 PROCEDURE — 97530 THERAPEUTIC ACTIVITIES: CPT

## 2021-02-11 PROCEDURE — 97140 MANUAL THERAPY 1/> REGIONS: CPT

## 2021-02-11 NOTE — PROGRESS NOTES
PHYSICAL THERAPY - DAILY TREATMENT NOTE    Patient Name: Qian Paredes. Date: 2021  : 1955   yes Patient  Verified  Visit #:   12    Insurance: Payor: Jeffery Martinez / Plan: [facility] [employer] MEDICARE CHOICE PPO/PFFS / Product Type: Managed Care Medicare /      In time: 8:30 Out time: 9:28   Total Treatment Time: 58     Medicare/[facility] Time Tracking (below)   Total Timed Codes (min):  43 1:1 Treatment Time:  43     TREATMENT AREA =  Lower back pain [M54.5]  Pain in thoracic spine [M54.6]    SUBJECTIVE  Pain Level (on 0 to 10 scale):  9  / 10   Medication Changes/New allergies or changes in medical history, any new surgeries or procedures?    no  If yes, update Summary List   Subjective Functional Status/Changes:  []  No changes reported   I tripped on my front porch on Tuesday and twisted my back, so I am really hurting a lot today, my pain is about a 9/10, so I don't know how much that I will be able to do, but I wanted to show up for the reassessment today.          OBJECTIVE  Modalities Rationale:     decrease pain and increase tissue extensibility to improve patient's ability to improve functional abilities   min [] Estim, type/location:                                      []  att     []  unatt     []  w/US     []  w/ice    []  w/heat    min []  Mechanical Traction: type/lbs                   []  pro   []  sup   []  int   []  cont    []  before manual    []  after manual    min []  Ultrasound, settings/location:      min []  Iontophoresis w/ dexamethasone, location:                                               []  take home patch       []  in clinic   15 min []  Ice     [x]  Heat    location/position: Lumbar/seated    min []  Vasopneumatic Device, press/temp:     min []  Other:    [] Skin assessment post-treatment (if applicable):    []  intact    []  redness- no adverse reaction     []redness  adverse reaction:        12 min Manual Therapy: STM/tissue mobs to R>L lumbar quadrants in prone   Rationale:      decrease pain, increase ROM, increase tissue extensibility, decrease trigger points and increase postural awareness to improve patient's ability to improve functional mobility  The manual therapy interventions were performed at a separate and distinct time from the therapeutic activities interventions. 31 min Therapeutic Activity: [x]  See flow sheet   Rationale:    Detailed reassessment of progress towards goals for discharge summary       Billed With/As:   [] TE   [] TA   [] Neuro   [] Self Care Patient Education: [x] Review HEP    [] Progressed/Changed HEP based on:   [] positioning   [] body mechanics   [] transfers   [] heat/ice application    [] other:      Other Objective/Functional Measures:       Post Treatment Pain Level (on 0 to 10) scale:   6  / 10     ASSESSMENT  Assessment/Changes in Function:   See discharge summary for full final detailed progress towards all established goals. []  See Progress Note/Recertification      Progress toward goals / Updated goals:  See discharge summary for full final detailed progress towards all established goals. PLAN  []  Upgrade activities as tolerated no Continue plan of care   [x]  Discharge due to : Patient has achieved maximum medical benefit/potential from current POC after completing 12 of 8-12 prescribed visits and is ready for discharge from therapy at this time.    []  Other:      Therapist: Snow Segura PTA    Date: 2/11/2021 Time: 8:34 AM     Future Appointments   Date Time Provider Darian Dumont   2/26/2021  1:45 PM Fer Jimenes MD [facility]

## 2021-02-11 NOTE — PROGRESS NOTES
[facility] PHYSICAL THERAPY  [address], [place], [address] - Phone: [phone]  Fax: [phone]  DISCHARGE SUMMARY FOR PHYSICAL THERAPY          Patient Name: Cameron Zendejas. : 1955   Treatment/Medical Diagnosis: Lower back pain [M54.5]  Pain in thoracic spine [M54.6]   Onset Date: ~May 2020    Referral Source: Roshni Hodges MD [facility]): 2020   Prior Hospitalization: See Medical History Provider #: [id number]   Prior Level of Function: I in all ADLs and functional mobility. Able to walk 20 mins without pain, sit for 1 hour, and perform yard work. Comorbidities: Depression, thyroid problems, HTN, visually impaired, hearing impaired, latex allergy, cancer, recent weight change of more than 10 lbs   Medications: Verified on Patient Summary List   Visits from [facility]: 12 Missed Visits:      Goal/Measure of Progress Goal Met? 1. Patient will improve FOTO assessment score to >=53  pts in order to indicate improved functional abilities. Status at last Eval: 45/100 Current Status: 42/100 no   2. Patient will improve LETICIA hip extension to 20 deg for improved posture and gait mechanics   Status at last Eval: Unable to tolerate extension today, exacerbates pain/symptoms Current Status: R= 12 degrees  L= 10 degrees no   3. Patient will report worst back pain as 3/10 or less in order to progress toward personal goals. Status at last Eval: Max 8/10 with sit<>stand trf especially in/out of his truck, lifting and carrying type ADL's as well as w/o particular change/increase in activity Current Status: max 8/10 with prolonged walking > 10 minutes as well as with twisting/rotational as well as forward flexion/reaching type activities no     Goal/Measure of Progress Goal Met? 4. Patient will report overall at least 65% improvement in function in order to progress toward premorbid status.    Status at last Eval: 20% improvement reported Current Status: 30% improvement  no   5. Patient will report ability to walk for 20 mins with mild and manageable to no pain to care for his dog   Status at last Eval: 5 mins with pain progressing the longer he walks Current Status: 10 minutes maximum walking tolerance before increasing pain level to 8/10 no   6. .Patient will report ability to sit for 1 hour for meals   Status at last Eval: 30 minutes  Current Status: 30 minutes no     Key Functional Changes/Progress: Patient reports approximately 30% overall improvement from therapy since initial evaluation with 4-5/10 pain level on average, increased to 8/10 at the worst with prolonged walking > 10 minutes as well as with twisting/rotational as well as forward flexion/reaching type activities. Pt reports the most functional improvement with increased lumbar/LE strength with ADL's compared to before initial evaluation. Pt has had limited progress with progression of therapy over the past few visits due to ongoing pain. Pt is currently independent with an appropriate HEP and is in agreement to d/c further PT at this time. OBJECTIVE MEASUREMENTS:  Lumbar AROM: Flexion= 70%; Extension=25%; Side bending= 20% bilaterally (side bending limited by pain and guarding due to recent fall on his front porch since last visit)    Assessments/Recommendations: Patient has achieved maximum medical benefit/potential from current POC after completing 12 of 8-12 prescribed visits and is ready for discharge from therapy at this time. PT has recommended pt return to MD for further recommendations. If you have any questions/comments please contact us directly at [phone]. Thank you for allowing us to assist in the care of your patient.     Therapist Signature: Aden Parnell PTA/  TRACY GalindoT Date: 2/11/21   Reporting Period: 11/24/2020 - 2/11/2021 Time: 8:55 AM

## 2021-02-26 ENCOUNTER — OFFICE VISIT (OUTPATIENT)
Dept: CARDIOLOGY CLINIC | Age: 66
End: 2021-02-26
Payer: MEDICARE

## 2021-02-26 VITALS
BODY MASS INDEX: 32.08 KG/M2 | DIASTOLIC BLOOD PRESSURE: 75 MMHG | HEIGHT: 74 IN | OXYGEN SATURATION: 99 % | SYSTOLIC BLOOD PRESSURE: 134 MMHG | WEIGHT: 250 LBS | HEART RATE: 57 BPM | TEMPERATURE: 98.1 F

## 2021-02-26 DIAGNOSIS — I08.0 MITRAL AND AORTIC REGURGITATION: ICD-10-CM

## 2021-02-26 DIAGNOSIS — I77.810 AORTIC ECTASIA, THORACIC (HCC): ICD-10-CM

## 2021-02-26 DIAGNOSIS — I10 ESSENTIAL HYPERTENSION: ICD-10-CM

## 2021-02-26 DIAGNOSIS — I48.0 PAROXYSMAL ATRIAL FIBRILLATION (HCC): Primary | ICD-10-CM

## 2021-02-26 PROCEDURE — G8754 DIAS BP LESS 90: HCPCS | Performed by: INTERNAL MEDICINE

## 2021-02-26 PROCEDURE — G9717 DOC PT DX DEP/BP F/U NT REQ: HCPCS | Performed by: INTERNAL MEDICINE

## 2021-02-26 PROCEDURE — G8752 SYS BP LESS 140: HCPCS | Performed by: INTERNAL MEDICINE

## 2021-02-26 PROCEDURE — 1101F PT FALLS ASSESS-DOCD LE1/YR: CPT | Performed by: INTERNAL MEDICINE

## 2021-02-26 PROCEDURE — G8427 DOCREV CUR MEDS BY ELIG CLIN: HCPCS | Performed by: INTERNAL MEDICINE

## 2021-02-26 PROCEDURE — 93000 ELECTROCARDIOGRAM COMPLETE: CPT | Performed by: INTERNAL MEDICINE

## 2021-02-26 PROCEDURE — 99214 OFFICE O/P EST MOD 30 MIN: CPT | Performed by: INTERNAL MEDICINE

## 2021-02-26 PROCEDURE — 3017F COLORECTAL CA SCREEN DOC REV: CPT | Performed by: INTERNAL MEDICINE

## 2021-02-26 PROCEDURE — G8417 CALC BMI ABV UP PARAM F/U: HCPCS | Performed by: INTERNAL MEDICINE

## 2021-02-26 PROCEDURE — G8536 NO DOC ELDER MAL SCRN: HCPCS | Performed by: INTERNAL MEDICINE

## 2021-02-26 RX ORDER — LEVETIRACETAM 500 MG/1
500 TABLET ORAL 2 TIMES DAILY
COMMUNITY
End: 2021-08-27 | Stop reason: ALTCHOICE

## 2021-02-26 RX ORDER — CARBAMAZEPINE 200 MG/1
200 CAPSULE, EXTENDED RELEASE ORAL 2 TIMES DAILY
COMMUNITY
End: 2021-08-27 | Stop reason: SINTOL

## 2021-02-26 NOTE — PROGRESS NOTES
1. Have you been to the ER, urgent care clinic since your last visit? Hospitalized since your last visit?     no    2. Have you seen or consulted any other health care providers outside of the [address] [place] [address] since your last visit? Include any pap smears or colon screening. Yes When: x 2 wks ago with PCP      3. Since your last visit, have you had any of the following symptoms?      swelling in legs/arms. Explain:bilateral legs    4. Have you had any blood work, X-rays or cardiac testing? Yes When: blood work x 3 months ago with PCP      Requested: NO    5. Where do you normally have your labs drawn? PCP or [address] [place] [address]     6. Do you need any refills today?    no

## 2021-02-26 NOTE — PATIENT INSTRUCTIONS
There are no discontinued medications. Learning About the [address] [facility] [place] Diet What is the Mediterranean diet? The Mediterranean diet is a style of eating rather than a diet plan. It features foods eaten in [place], [place], [place], and other countries along the [facility]. It emphasizes eating foods like fish, fruits, vegetables, beans, high-fiber breads and whole grains, nuts, and olive oil. This style of eating includes limited red meat, cheese, and sweets. Why choose the Mediterranean diet? A Mediterranean-style diet may improve heart health. It contains more fat than other heart-healthy diets. But the fats are mainly from nuts, unsaturated oils (such as fish oils and olive oil), and certain nut or seed oils (such as canola, soybean, or flaxseed oil). These fats may help protect the heart and blood vessels. How can you get started on the Mediterranean diet? Here are some things you can do to switch to a more Mediterranean way of eating. What to eat · Eat a variety of fruits and vegetables each day, such as grapes, blueberries, tomatoes, broccoli, peppers, figs, olives, spinach, eggplant, beans, lentils, and chickpeas. · Eat a variety of whole-grain foods each day, such as oats, brown rice, and whole wheat bread, pasta, and couscous. · Eat fish at least 2 times a week. Try tuna, salmon, mackerel, lake trout, herring, or sardines. · Eat moderate amounts of low-fat dairy products, such as milk, cheese, or yogurt. · Eat moderate amounts of poultry and eggs. · Choose healthy (unsaturated) fats, such as nuts, olive oil, and certain nut or seed oils like canola, soybean, and flaxseed. · Limit unhealthy (saturated) fats, such as butter, palm oil, and coconut oil. And limit fats found in animal products, such as meat and dairy products made with whole milk. Try to eat red meat only a few times a month in very small amounts. · Limit sweets and desserts to only a few times a week. This includes sugar-sweetened drinks like soda. The Mediterranean diet may also include red wine with your meal1 glass each day for women and up to 2 glasses a day for men. Tips for eating at home · Use herbs, spices, garlic, lemon zest, and citrus juice instead of salt to add flavor to foods. · Add avocado slices to your sandwich instead of fuller. · Have fish for lunch or dinner instead of red meat. Brush the fish with olive oil, and broil or grill it. · Sprinkle your salad with seeds or nuts instead of cheese. · Cook with olive or canola oil instead of butter or oils that are high in saturated fat. · Switch from 2% milk or whole milk to 1% or fat-free milk. · Dip raw vegetables in a vinaigrette dressing or hummus instead of dips made from mayonnaise or sour cream. 
· Have a piece of fruit for dessert instead of a piece of cake. Try baked apples, or have some dried fruit. Tips for eating out · Try broiled, grilled, baked, or poached fish instead of having it fried or breaded. · Ask your  to have your meals prepared with olive oil instead of butter. · Order dishes made with marinara sauce or sauces made from olive oil. Avoid sauces made from cream or mayonnaise. · Choose whole-grain breads, whole wheat pasta and pizza crust, brown rice, beans, and lentils. · Cut back on butter or margarine on bread. Instead, you can dip your bread in a small amount of olive oil. · Ask for a side salad or grilled vegetables instead of french fries or chips. Where can you learn more? Go to http://www.gray.com/ Enter 619-814-7005 in the search box to learn more about \"Learning About the Mediterranean Diet. \" Current as of: August 22, 2019               Content Version: 12.6 © 3319-1695 [employer]. Care instructions adapted under license by [employer] (which disclaims liability or warranty for this information). If you have questions about a medical condition or this instruction, always ask your healthcare professional. [employer] disclaims any warranty or liability for your use of this information.

## 2021-02-26 NOTE — PROGRESS NOTES
HISTORY OF PRESENT ILLNESS  Rogerio Langston is a 77 y.o. male. 9/19 new patient just moved from [place]. Seen for preop clearance. Has chronic back problems and peripheral neuropathy. History of paroxysmal A. Fib. Last episode 2013 approximately per patient. Has good functional capacity as he can go up 2-3 flights of steps and can walk half to 1 mile on a level ground without stopping.  2/2020 He c/o palpitations with associated SOB and chest pain with flushed sensation. Episodes last about 20 minutes and resolve. He is anticipating right venous surgery in March.  6/2020 Denies palpitations, SOB or chest pain. Dizziness  The history is provided by the patient. This is a new problem. The current episode started more than 1 week ago (7/20). The problem occurs daily. The problem has been resolved. Pertinent negatives include no chest pain, no headaches and no shortness of breath. The symptoms are aggravated by standing. Valvular Heart Disease  The history is provided by the patient and medical records (MR). Pertinent negatives include no chest pain, no headaches and no shortness of breath. Hypertension  The history is provided by the medical records. This is a chronic problem. Pertinent negatives include no chest pain, no headaches and no shortness of breath. Palpitations   The history is provided by the patient (PAF). This is a new problem. Episode onset: 5/19. The problem has been resolved (Since Rythmol started). The problem occurs rarely (2-3/month). On average, each episode lasts 5 minutes. The problem is associated with nothing. Associated symptoms include lower extremity edema. Pertinent negatives include no diaphoresis, no fever, no malaise/fatigue, no chest pain, no claudication, no orthopnea, no PND, no nausea, no vomiting, no headaches, no dizziness, no cough and no shortness of breath. His past medical history is significant for hypertension.    Leg Swelling  The history is provided by the patient. This is a new problem. The current episode started more than 1 week ago (3/19). The problem occurs constantly. The problem has been rapidly improving. Pertinent negatives include no chest pain, no headaches and no shortness of breath. Nothing aggravates the symptoms. Review of Systems   Constitutional: Negative for chills, diaphoresis, fever, malaise/fatigue and weight loss. HENT: Negative for nosebleeds. Eyes: Negative for discharge. Respiratory: Negative for cough, shortness of breath and wheezing. Cardiovascular: Positive for palpitations (PAF) and leg swelling. Negative for chest pain, orthopnea, claudication and PND. Gastrointestinal: Negative for diarrhea, nausea and vomiting. Genitourinary: Negative for dysuria and hematuria. Musculoskeletal: Negative for joint pain. Skin: Negative for rash. Neurological: Negative for dizziness, seizures, loss of consciousness and headaches. Endo/Heme/Allergies: Negative for polydipsia. Does not bruise/bleed easily. Psychiatric/Behavioral: Negative for depression and substance abuse. The patient does not have insomnia.       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    Cancer ([facility] [address] [facility]     Depression     Endocrine disease     Hypothyroidism    Fractures 1978, 2000    ankle left, left wrist    Headache     Hearing loss 2013    Hypertension     Ill-defined condition     Pain stimulator in back    Ill-defined condition     irregular heartbeat    Nausea & vomiting  Psychiatric disorder     depression    Sleep apnea     on cpap    Thyroid disease        Family History   Problem Relation Age of Onset    Arthritis-osteo Mother     Bleeding Prob Mother     Cancer Mother     Headache Mother     Hypertension Mother     Migraines Mother     Stroke Mother 68    Arthritis-osteo Father     Stroke Father 80    Arthritis-osteo Sister     Cancer Sister     Migraines Sister     Arthritis-osteo Brother     Cancer Brother     Headache Brother     Migraines Brother     Stroke Paternal Uncle     Heart Surgery Paternal Uncle 72    Diabetes Other        Social History     Tobacco Use    Smoking status: Former Smoker     Quit date:      Years since quittin.1    Smokeless tobacco: Never Used   Substance Use Topics    Alcohol use: No     Frequency: Never    Drug use: No        Current Outpatient Medications   Medication Sig    carBAMazepine ER (CARBATROL ER) 200 mg capsule Take 200 mg by mouth two (2) times a day.  levETIRAcetam (KEPPRA) 500 mg tablet Take 500 mg by mouth two (2) times a day.  gabapentin (NEURONTIN) 300 mg capsule TAKE 1 CAPSULE BY MOUTH TWICE DAILY    linaCLOtide (Linzess) 145 mcg cap capsule Take 1 Cap by mouth Daily (before breakfast).  diclofenac (VOLTAREN) 1 % gel Apply 2 g to affected area three (3) times daily as needed for Pain.  naloxone (NARCAN) 4 mg/actuation nasal spray Use 1 spray intranasally, then discard. Repeat with new spray every 2 min as needed for opioid overdose symptoms, alternating nostrils.  lisinopriL (PRINIVIL, ZESTRIL) 10 mg tablet Take 1 Tab by mouth daily.  Eliquis 5 mg tablet Take 1 tablet by mouth twice daily    tiZANidine (ZANAFLEX) 4 mg capsule Take 4 mg by mouth three (3) times daily.  propafenone (RYTHMOL) 300 mg tablet Take 1 Tab by mouth three (3) times daily.  escitalopram oxalate (LEXAPRO) 20 mg tablet Take 20 mg by mouth daily.  Take 1 tablet by mouth once daily in the morning for 6 days then 1 once daily in the morning    busPIRone (BUSPAR) 10 mg tablet TAKE 1 TABLET BY MOUTH THREE TIMES DAILY (Patient taking differently: Take 10 mg by mouth four (4) times daily as needed (anxiety). )    buPROPion SR (WELLBUTRIN SR) 150 mg SR tablet TAKE 1 TABLET BY MOUTH ONCE DAILY    testosterone cypionate (DEPOTESTOTERONE CYPIONATE) 200 mg/mL injection 0.5 mL by IntraMUSCular route every seven (7) days. Max Daily Amount: 100 mg. (Patient taking differently: 100 mg by IntraMUSCular route every fourteen (14) days.)    esomeprazole (NEXIUM) 40 mg capsule Take 1 Cap by mouth daily.  cholecalciferol (VITAMIN D3) 1,000 unit tablet Take  by mouth daily.  HYDROcodone-acetaminophen (NORCO)  mg tablet Take 1 Tab by mouth every twelve (12) hours as needed for Pain. Max Daily Amount: 2 Tabs.  amLODIPine (NORVASC) 5 mg tablet Take 1 Tab by mouth daily.  levothyroxine (SYNTHROID) 100 mcg tablet Take 1 Tab by mouth Daily (before breakfast). No current facility-administered medications for this visit. Past Surgical History:   Procedure Laterality Date    HX BACK SURGERY  10/10/2019    Revision SCS    HX COLONOSCOPY      HX HEENT  2003    Reconstructive surgery on face    HX KNEE REPLACEMENT  2015/2016    bilat knees    HX ORTHOPAEDIC  [id number]    back surgery X4       Visit Vitals  /75 (BP 1 Location: Left upper arm, BP Patient Position: Sitting, BP Cuff Size: Large adult)   Pulse (!) 57   Temp 98.1 °F (36.7 °C) (Temporal)   Ht 6' 2\" (1.88 m)   Wt 113.4 kg (250 lb)   SpO2 99%   BMI 32.10 kg/m²       Diagnostic Studies:  I have reviewed the relevant tests done on the patient and show as follows  EKG tracings reviewed by me today.     EKG Results     Procedure 720 Value Units Date/Time    AMB POC EKG ROUTINE W/ 12 LEADS, INTER & REP [[id number]]     Order Status: Sent         XR Results (most recent):  Results from [place] encounter on 09/29/20   XR SPINE LUMB COMP W BEND    Narrative Lumbar spine, 7 views including flexion and extension. INDICATION:Radiculopathy, lumbar region. COMPARISON: CT 5/29/2019    FINDINGS:    Status post posterior fusion at L2-L3, L3-L4, and L4-L5. No lucency surrounding  the screws. Flexion-extension views demonstrate degenerative retrolisthesis at  L2-L3. Evaluation of L5-S1 is limited by positioning, but there is at least 10  mm grade 1 anterolisthesis which measures approximately 12 mm with flexion. Impression IMPRESSION:     1.  Stable postsurgical changes. No acute hardware abnormality. 2.  There is apparent increase in anterolisthesis at L5-S1 although positioning  is suboptimal. Dynamic measurements as above. Cross-sectional imaging may be of  benefit for confirmation. 11/04/19   ECHO ADULT COMPLETE 11/04/2019 11/4/2019    Narrative · Left Ventricle: Normal cavity size and systolic function (ejection   fraction normal). Mildly to moderately increased wall thickness. Estimated   left ventricular ejection fraction is 56 - 60%. Moderate (grade 2) left   ventricular diastolic dysfunction. · Right Ventricle: Normal right ventricular size and function. · Right Atrium: Mildly dilated right atrium. · Mitral Valve: Mitral valve thickening. Mild mitral valve regurgitation   is present. · Aortic Valve: Mild aortic valve regurgitation is present. · Tricuspid Valve: Mild tricuspid valve regurgitation is present. · Pulmonary Artery: There is no evidence of pulmonary hypertension. · Aorta: Mildly dilated aortic root; diameter is 3.9 cm. Mildly dilated   ascending aorta; diameter is 4 cm. Signed by: Denman Najjar, MD     12/04/19   NUCLEAR CARDIAC STRESS TEST 12/04/2019 12/4/2019    Narrative · Baseline ECG: Normal EKG, myocardial infarction. The infarction is   located in the inferior regions.  .  · Negative stress test.  · Gated SPECT: Left ventricular function post-stress was normal.   Calculated ejection fraction is 64%. There is no evidence of transient   ischemic dilation (TID). The TID ratio is 0.88. · Left ventricular perfusion is normal.  · Negative myocardial perfusion imaging. Myocardial perfusion imaging   supports a low risk stress test.        Signed by: Leila Fink MD   10/19 cardiac event monitor  Paroxysmal atrial flutter. 7/20 carotid duplex  Interpretation Summary  No significant obstructive lesions noted in the right extracranial carotid  system. < 50% left internal carotid artery stenosis. Antegrade flow noted in the vertebral arteries. Normal flow noted in the subclavian arteries. 8/20 Tilt Table  CONCLUSION: Negative tilt table test including sublingual nitroglycerin  after a fluid bolus. Mr. Zaid Hagan has a reminder for a \"due or due soon\" health maintenance. I have asked that he contact his primary care provider for follow-up on this health maintenance. Physical Exam   Constitutional: He is oriented to person, place, and time. He appears well-developed and well-nourished. No distress. HENT:   Head: Normocephalic and atraumatic. Mouth/Throat: Normal dentition. Eyes: Right eye exhibits no discharge. Left eye exhibits no discharge. No scleral icterus. Neck: Neck supple. No JVD present. Carotid bruit is not present. No thyromegaly present. Cardiovascular: Normal rate, regular rhythm, S1 normal, S2 normal, normal heart sounds and intact distal pulses. Exam reveals no gallop and no friction rub. No murmur heard. Pulmonary/Chest: Effort normal and breath sounds normal. He has no wheezes. He has no rales. Abdominal: Soft. He exhibits no mass. There is no abdominal tenderness. Musculoskeletal:         General: No edema. Lymphadenopathy:        Right cervical: No superficial cervical adenopathy present. Left cervical: No superficial cervical adenopathy present. Neurological: He is alert and oriented to person, place, and time. Skin: Skin is warm and dry.  No rash noted.   Psychiatric: He has a normal mood and affect. His behavior is normal.       ASSESSMENT and PLAN    History of paroxysmal A. Fib. Last episode 2013 approximately per patient. Mitral and aortic regurgitation: Mild 10/19  Aortic ectasia: 11/19 aortic root 3.9, ASC ao 4.0;    Results for GABY RICHARDSON JR. (MRN [id number]) as of 9/13/2019 11:15   Ref. Range 2/1/2019 08:49   Triglyceride Latest Ref Range: <150 MG/DL 63   Cholesterol, total Latest Ref Range: <200 MG/   HDL Cholesterol Latest Ref Range: 40 - 60 MG/DL 46   CHOL/HDL Ratio Latest Ref Range: 0 - 5.0   3.0   LDL, calculated Latest Ref Range: 0 - 100 MG/DL 77.4   VLDL, calculated Latest Units: MG/DL 12.6       2/2020  Continues to c/o palpitations with SOB, dizziness and flushed sensation. These occur daily and last 5-20 minutes. Will increase propafenone to 300 mg/ TID. Check EKG in 1 week. If palpitations continue, will evaluate for arrhythmias with Holter monitor. To resume lasix and compression stockings for edema. F/U with vascular as scheduled. Advised to wear CPAP consistently, this may improve SOB and palpitations. EKG - SR mild increase in QRS. Repeat EKG in 1 week. 6/2020  Reports palpitations have resolved with increased dose of prpafenone to 300 mg / TID. EKG with  - will monitor and repeat EKG in 3 months. He has lost 23 pounds since LOV and was congratulated. Diet and exercise encouraged to promote further weight loss. Continues to have edema to RLE. He is to have venous closure to saphenous vein of right leg.    7/20 patient complains of mild dizziness which is new and had orthostatic changes in PCPs office. It is likely secondary to volume depletion and weight loss. Will discontinue diuretics and follow home BP chart. Continue other medications but if the BP stays in the low normal range, will reduce other medications gradually as well. 9/20 tilt table test was negative.   Blood pressure is very good and patient is losing some weight. Reduce lisinopril and follow the home chart. As patient says that he will lose more weight. Will adjust medications as needed in future. Diagnoses and all orders for this visit:    1. Paroxysmal atrial fibrillation (HCC)  -     AMB POC EKG ROUTINE W/ 12 LEADS, INTER & REP    2. Essential hypertension    3. Aortic ectasia, thoracic (HCC)  -     ECHO ADULT COMPLETE; Future    4. Mitral and aortic regurgitation  -     ECHO ADULT COMPLETE; Future        Pertinent laboratory and test data reviewed and discussed with patient. See patient instructions also for other medical advice given    There are no discontinued medications. Follow-up and Dispositions    · Return in about 7 months (around 9/26/2021), or if symptoms worsen or fail to improve, for post test.       2/21 atrial fibrillation is staying in sinus rhythm/sinus bradycardia. QTC and QRS duration are acceptable. Blood pressure is controlled. MR and AI stable. Follow clinically. Repeat echo to follow-up on aortic ectasia as well as MR and AI. Mediterranean diet guidelines printed.

## 2021-06-08 ENCOUNTER — TELEPHONE (OUTPATIENT)
Dept: CARDIOLOGY CLINIC | Age: 66
End: 2021-06-08

## 2021-06-08 NOTE — TELEPHONE ENCOUNTER
Called patient per Tresa Israel NP, recommend to hold amlodipine at this time due to hypotension maintain BP/Hr chart and send to the office after 1 week. He voices understanding and acceptance of this advice and will call back if any further questions or concerns.

## 2021-06-08 NOTE — TELEPHONE ENCOUNTER
Recommend To hold amlodipine at this time due to hypotension maintain heart rate blood pressure chart and send to the office after 1 week.

## 2021-06-08 NOTE — TELEPHONE ENCOUNTER
Per Dr. Sherry Holliday last office note, patient was to decrease lisinopril - please ask what current dose of lisinopril and if he is also taking Amlodipine 5 mg.   Thanks

## 2021-06-08 NOTE — TELEPHONE ENCOUNTER
Patient called and stated BP was 128/85 hr 59 this morning at 7am then he felt bad took Bp again [id number] am bp was 94/63 hr 50 patient states he feel little weak and dizzy.  Please advise

## 2021-07-09 DIAGNOSIS — I48.0 PAROXYSMAL ATRIAL FIBRILLATION (HCC): ICD-10-CM

## 2021-07-09 RX ORDER — PROPAFENONE HYDROCHLORIDE 300 MG/1
TABLET, COATED ORAL
Qty: 270 TABLET | Refills: 0 | Status: SHIPPED | OUTPATIENT
Start: 2021-07-09 | End: 2021-10-04

## 2021-08-27 ENCOUNTER — OFFICE VISIT (OUTPATIENT)
Dept: CARDIOLOGY CLINIC | Age: 66
End: 2021-08-27
Payer: MEDICARE

## 2021-08-27 VITALS
BODY MASS INDEX: 34.14 KG/M2 | DIASTOLIC BLOOD PRESSURE: 73 MMHG | SYSTOLIC BLOOD PRESSURE: 128 MMHG | HEART RATE: 55 BPM | OXYGEN SATURATION: 98 % | WEIGHT: 266 LBS | HEIGHT: 74 IN

## 2021-08-27 DIAGNOSIS — I08.0 MITRAL AND AORTIC REGURGITATION: ICD-10-CM

## 2021-08-27 DIAGNOSIS — G47.33 OSA ON CPAP: ICD-10-CM

## 2021-08-27 DIAGNOSIS — I48.0 PAROXYSMAL ATRIAL FIBRILLATION (HCC): Primary | ICD-10-CM

## 2021-08-27 DIAGNOSIS — I77.810 AORTIC ECTASIA, THORACIC (HCC): ICD-10-CM

## 2021-08-27 DIAGNOSIS — Z79.01 CURRENT USE OF LONG TERM ANTICOAGULATION: ICD-10-CM

## 2021-08-27 DIAGNOSIS — I10 ESSENTIAL HYPERTENSION: ICD-10-CM

## 2021-08-27 DIAGNOSIS — E66.9 OBESITY (BMI 30.0-34.9): ICD-10-CM

## 2021-08-27 DIAGNOSIS — Z99.89 OSA ON CPAP: ICD-10-CM

## 2021-08-27 PROCEDURE — G8536 NO DOC ELDER MAL SCRN: HCPCS | Performed by: INTERNAL MEDICINE

## 2021-08-27 PROCEDURE — G8427 DOCREV CUR MEDS BY ELIG CLIN: HCPCS | Performed by: INTERNAL MEDICINE

## 2021-08-27 PROCEDURE — G8417 CALC BMI ABV UP PARAM F/U: HCPCS | Performed by: INTERNAL MEDICINE

## 2021-08-27 PROCEDURE — G8752 SYS BP LESS 140: HCPCS | Performed by: INTERNAL MEDICINE

## 2021-08-27 PROCEDURE — G9717 DOC PT DX DEP/BP F/U NT REQ: HCPCS | Performed by: INTERNAL MEDICINE

## 2021-08-27 PROCEDURE — 99214 OFFICE O/P EST MOD 30 MIN: CPT | Performed by: INTERNAL MEDICINE

## 2021-08-27 PROCEDURE — G8754 DIAS BP LESS 90: HCPCS | Performed by: INTERNAL MEDICINE

## 2021-08-27 PROCEDURE — 1101F PT FALLS ASSESS-DOCD LE1/YR: CPT | Performed by: INTERNAL MEDICINE

## 2021-08-27 PROCEDURE — 3017F COLORECTAL CA SCREEN DOC REV: CPT | Performed by: INTERNAL MEDICINE

## 2021-08-27 RX ORDER — LANOLIN ALCOHOL/MO/W.PET/CERES
1000 CREAM (GRAM) TOPICAL DAILY
COMMUNITY
End: 2022-04-15 | Stop reason: ALTCHOICE

## 2021-08-27 RX ORDER — OXYCODONE AND ACETAMINOPHEN 7.5; 325 MG/1; MG/1
TABLET ORAL
COMMUNITY

## 2021-08-27 NOTE — LETTER
NOTIFICATION RETURN TO WORK / SCHOOL    8/27/2021 9:24 AM    Mr. Lacey Parnell. [id number] (f[address] [email or web address])      To Whom It May Concern:    Lacey Parnell. is currently under the care of [address] [place] [address],[address]. Patient cleared to work out with no restrictions. If there are questions or concerns please have the patient contact our office.         Sincerely,        Beth Salas MD

## 2021-08-27 NOTE — PROGRESS NOTES
HISTORY OF PRESENT ILLNESS  Bailey Briones is a 74 y.o. male. 9/19 new patient just moved from [place]. Seen for preop clearance. Has chronic back problems and peripheral neuropathy. History of paroxysmal A. Fib. Last episode 2013 approximately per patient. Has good functional capacity as he can go up 2-3 flights of steps and can walk half to 1 mile on a level ground without stopping.  2/2020 He c/o palpitations with associated SOB and chest pain with flushed sensation. Episodes last about 20 minutes and resolve. He is anticipating right venous surgery in March.  6/2020 Denies palpitations, SOB or chest pain. Dizziness  The history is provided by the patient. This is a new problem. The current episode started more than 1 week ago (7/20). The problem occurs daily. The problem has been resolved. Pertinent negatives include no chest pain, no headaches and no shortness of breath. The symptoms are aggravated by standing. Hypertension  The history is provided by the medical records. This is a chronic problem. Pertinent negatives include no chest pain, no headaches and no shortness of breath. Palpitations   The history is provided by the patient (PAF). This is a new problem. Episode onset: 5/19. The problem has been resolved (Since Rythmol started). The problem occurs rarely (2-3/month). On average, each episode lasts 5 minutes. The problem is associated with nothing. Associated symptoms include lower extremity edema. Pertinent negatives include no diaphoresis, no fever, no malaise/fatigue, no chest pain, no claudication, no orthopnea, no PND, no nausea, no vomiting, no headaches, no dizziness, no cough and no shortness of breath. His past medical history is significant for hypertension. Valvular Heart Disease  The history is provided by the patient and medical records (MR). Pertinent negatives include no chest pain, no headaches and no shortness of breath.    Leg Swelling  The history is provided by the patient. This is a new problem. The current episode started more than 1 week ago (3/19). The problem occurs constantly. The problem has been rapidly improving. Pertinent negatives include no chest pain, no headaches and no shortness of breath. Nothing aggravates the symptoms. Follow-up  Pertinent negatives include no chest pain, no headaches and no shortness of breath. Review of Systems   Constitutional: Negative for chills, diaphoresis, fever, malaise/fatigue and weight loss. HENT: Negative for nosebleeds. Eyes: Negative for discharge. Respiratory: Negative for cough, shortness of breath and wheezing. Cardiovascular: Positive for palpitations (PAF) and leg swelling. Negative for chest pain, orthopnea, claudication and PND. Gastrointestinal: Negative for diarrhea, nausea and vomiting. Genitourinary: Negative for dysuria and hematuria. Musculoskeletal: Negative for joint pain. Skin: Negative for rash. Neurological: Negative for dizziness, seizures, loss of consciousness and headaches. Endo/Heme/Allergies: Negative for polydipsia. Does not bruise/bleed easily. Psychiatric/Behavioral: Negative for depression and substance abuse. The patient does not have insomnia.       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    Cancer ([facility] [address]     Depression     Endocrine disease     Hypothyroidism    Fractures 1978, 2000    ankle left, left wrist    Headache     Hearing loss 2013    Hypertension     Ill-defined condition Pain stimulator in back    Ill-defined condition     irregular heartbeat    Nausea & vomiting     Psychiatric disorder     depression    Sleep apnea     on cpap    Thyroid disease        Family History   Problem Relation Age of Onset    Arthritis-osteo Mother     Bleeding Prob Mother     Cancer Mother     Headache Mother     Hypertension Mother     Migraines Mother     Stroke Mother 68    Arthritis-osteo Father     Stroke Father 80    Arthritis-osteo Sister     Cancer Sister     Migraines Sister     Arthritis-osteo Brother     Cancer Brother     Headache Brother     Migraines Brother     Stroke Paternal Uncle     Heart Surgery Paternal Uncle 72    Diabetes Other        Social History     Tobacco Use    Smoking status: Former Smoker     Quit date:      Years since quittin.6    Smokeless tobacco: Never Used   Vaping Use    Vaping Use: Never used   Substance Use Topics    Alcohol use: No    Drug use: No        Current Outpatient Medications   Medication Sig    cyanocobalamin 1,000 mcg tablet Take 1,000 mcg by mouth daily.  oxyCODONE-acetaminophen (PERCOCET 7.5) 7.5-325 mg per tablet oxycodone-acetaminophen 7.5 mg-325 mg tablet   TAKE 1 TABLET BY MOUTH EVERY 8 HOURS FOR CHRONIC PAIN    propafenone (RYTHMOL) 300 mg tablet TAKE 1 TABLET BY MOUTH THREE TIMES DAILY    apixaban (Eliquis) 5 mg tablet Take 1 Tab by mouth two (2) times a day.  lisinopriL (PRINIVIL, ZESTRIL) 10 mg tablet Take 1 tablet by mouth once daily (Patient taking differently: 10 mg.)    gabapentin (NEURONTIN) 300 mg capsule Take 300 mg by mouth three (3) times daily.  diclofenac (VOLTAREN) 1 % gel Apply 2 g to affected area three (3) times daily as needed for Pain.  naloxone (NARCAN) 4 mg/actuation nasal spray Use 1 spray intranasally, then discard. Repeat with new spray every 2 min as needed for opioid overdose symptoms, alternating nostrils.     escitalopram oxalate (LEXAPRO) 20 mg tablet Take 20 mg by mouth daily. Take 1 tablet by mouth once daily in the morning for 6 days then 1 once daily in the morning    busPIRone (BUSPAR) 10 mg tablet TAKE 1 TABLET BY MOUTH THREE TIMES DAILY (Patient taking differently: Take 10 mg by mouth four (4) times daily as needed (anxiety). )    buPROPion SR (WELLBUTRIN SR) 150 mg SR tablet TAKE 1 TABLET BY MOUTH ONCE DAILY    testosterone cypionate (DEPOTESTOTERONE CYPIONATE) 200 mg/mL injection 0.5 mL by IntraMUSCular route every seven (7) days. Max Daily Amount: 100 mg. (Patient taking differently: 100 mg by IntraMUSCular route every fourteen (14) days.)    esomeprazole (NEXIUM) 40 mg capsule Take 1 Cap by mouth daily.  cholecalciferol (VITAMIN D3) 1,000 unit tablet Take  by mouth daily.  levothyroxine (SYNTHROID) 100 mcg tablet Take 1 Tab by mouth Daily (before breakfast).  amLODIPine (NORVASC) 5 mg tablet Take 1 Tab by mouth daily. (Patient not taking: Reported on 8/27/2021)     No current facility-administered medications for this visit. Past Surgical History:   Procedure Laterality Date    HX BACK SURGERY  10/10/2019    Revision SCS    HX COLONOSCOPY      HX HEENT  2003    Reconstructive surgery on face    HX KNEE REPLACEMENT  2015/2016    bilat knees    HX ORTHOPAEDIC  [id number]    back surgery X4       Visit Vitals  /73 (BP 1 Location: Left upper arm, BP Patient Position: Sitting, BP Cuff Size: Adult)   Pulse (!) 55   Ht 6' 2\" (1.88 m)   Wt 120.7 kg (266 lb)   SpO2 98%   BMI 34.15 kg/m²       Diagnostic Studies:  I have reviewed the relevant tests done on the patient and show as follows  EKG tracings reviewed by me today. EKG Results     None        XR Results (most recent):  Results from Hospital Encounter encounter on 09/29/20    XR SPINE LUMB COMP W BEND    Narrative  Lumbar spine, 7 views including flexion and extension. INDICATION:Radiculopathy, lumbar region.     COMPARISON: CT 5/29/2019    FINDINGS:    Status post posterior fusion at L2-L3, L3-L4, and L4-L5. No lucency surrounding  the screws. Flexion-extension views demonstrate degenerative retrolisthesis at  L2-L3. Evaluation of L5-S1 is limited by positioning, but there is at least 10  mm grade 1 anterolisthesis which measures approximately 12 mm with flexion. Impression  IMPRESSION:    1.  Stable postsurgical changes. No acute hardware abnormality. 2.  There is apparent increase in anterolisthesis at L5-S1 although positioning  is suboptimal. Dynamic measurements as above. Cross-sectional imaging may be of  benefit for confirmation. 11/04/19   ECHO ADULT COMPLETE 11/04/2019 11/4/2019    Narrative · Left Ventricle: Normal cavity size and systolic function (ejection   fraction normal). Mildly to moderately increased wall thickness. Estimated   left ventricular ejection fraction is 56 - 60%. Moderate (grade 2) left   ventricular diastolic dysfunction. · Right Ventricle: Normal right ventricular size and function. · Right Atrium: Mildly dilated right atrium. · Mitral Valve: Mitral valve thickening. Mild mitral valve regurgitation   is present. · Aortic Valve: Mild aortic valve regurgitation is present. · Tricuspid Valve: Mild tricuspid valve regurgitation is present. · Pulmonary Artery: There is no evidence of pulmonary hypertension. · Aorta: Mildly dilated aortic root; diameter is 3.9 cm. Mildly dilated   ascending aorta; diameter is 4 cm. Signed by: Ciara Duran MD     12/04/19   NUCLEAR CARDIAC STRESS TEST 12/04/2019 12/4/2019    Narrative · Baseline ECG: Normal EKG, myocardial infarction. The infarction is   located in the inferior regions. .  · Negative stress test.  · Gated SPECT: Left ventricular function post-stress was normal.   Calculated ejection fraction is 64%. There is no evidence of transient   ischemic dilation (TID). The TID ratio is 0.88. · Left ventricular perfusion is normal.  · Negative myocardial perfusion imaging.  Myocardial perfusion imaging   supports a low risk stress test.        Signed by: Neva Mcallister MD   10/19 cardiac event monitor  Paroxysmal atrial flutter. 7/20 carotid duplex  Interpretation Summary  No significant obstructive lesions noted in the right extracranial carotid  system. < 50% left internal carotid artery stenosis. Antegrade flow noted in the vertebral arteries. Normal flow noted in the subclavian arteries. 8/20 Tilt Table  CONCLUSION: Negative tilt table test including sublingual nitroglycerin  after a fluid bolus. 8/21 echo  Interpretation Summary    · LV: Estimated LVEF is 60 - 65%. Normal cavity size and systolic function (ejection fraction normal). Mild concentric hypertrophy. Wall motion: normal. Moderate (grade 2) left ventricular diastolic dysfunction. · LA: Left Atrium volume index is 27.92 mL/m2. · RA: Mildly dilated right atrium. · AV: Mild aortic valve regurgitation is present. · MV: Mild mitral valve regurgitation is present. · TV: Mild tricuspid valve regurgitation is present. Right Ventricular Arterial Pressure (RVSP) is 36 mmHg. Pulmonary hypertension not suggested by Doppler findings. · PV: Mild pulmonic valve regurgitation is present. · AO: Aortic root is 4.2 cm, ascending aorta 4.2 cm. Comparison Study Information    Prior Study    When compared to the previous study from 11/4/19, aortic root diameter has increased from 3.9 cm and the ascending aorta diameter has increased from 4.0 cm. Mr. Tanvi Quick has a reminder for a \"due or due soon\" health maintenance. I have asked that he contact his primary care provider for follow-up on this health maintenance. Physical Exam  Constitutional:       General: He is not in acute distress. Appearance: He is well-developed. HENT:      Head: Normocephalic and atraumatic. Mouth/Throat:      Dentition: Normal dentition. Eyes:      General: No scleral icterus. Right eye: No discharge. Left eye: No discharge.    Neck: Thyroid: No thyromegaly. Vascular: No carotid bruit or JVD. Cardiovascular:      Rate and Rhythm: Normal rate and regular rhythm. Pulses: Intact distal pulses. Heart sounds: Normal heart sounds, S1 normal and S2 normal. No murmur heard. No friction rub. No gallop. Pulmonary:      Effort: Pulmonary effort is normal.      Breath sounds: Normal breath sounds. No wheezing or rales. Abdominal:      Palpations: Abdomen is soft. There is no mass. Tenderness: There is no abdominal tenderness. Musculoskeletal:      Cervical back: Neck supple. Lymphadenopathy:      Cervical:      Right cervical: No superficial cervical adenopathy. Left cervical: No superficial cervical adenopathy. Skin:     General: Skin is warm and dry. Findings: No rash. Neurological:      Mental Status: He is alert and oriented to person, place, and time. Psychiatric:         Behavior: Behavior normal.         ASSESSMENT and PLAN    History of paroxysmal A. Fib. Last episode 2013 approximately per patient. Mitral and aortic regurgitation: Mild 10/19; 8/21; Aortic ectasia: 11/19 aortic root 3.9, ASC ao 4.0; 8/21 ao root 4.2, ASC ao 4.2;    Results for Ananya RICHARDSON. Kip Cevallos (MRN [id number]) as of 9/13/2019 11:15   Ref. Range 2/1/2019 08:49   Triglyceride Latest Ref Range: <150 MG/DL 63   Cholesterol, total Latest Ref Range: <200 MG/   HDL Cholesterol Latest Ref Range: 40 - 60 MG/DL 46   CHOL/HDL Ratio Latest Ref Range: 0 - 5.0   3.0   LDL, calculated Latest Ref Range: 0 - 100 MG/DL 77.4   VLDL, calculated Latest Units: MG/DL 12.6       2/2020  Continues to c/o palpitations with SOB, dizziness and flushed sensation. These occur daily and last 5-20 minutes. Will increase propafenone to 300 mg/ TID. Check EKG in 1 week. If palpitations continue, will evaluate for arrhythmias with Holter monitor. To resume lasix and compression stockings for edema. F/U with vascular as scheduled.   Advised to wear CPAP consistently, this may improve SOB and palpitations. EKG - SR mild increase in QRS. Repeat EKG in 1 week. 6/2020  Reports palpitations have resolved with increased dose of prpafenone to 300 mg / TID. EKG with  - will monitor and repeat EKG in 3 months. He has lost 23 pounds since LOV and was congratulated. Diet and exercise encouraged to promote further weight loss. Continues to have edema to RLE. He is to have venous closure to saphenous vein of right leg.    7/20 patient complains of mild dizziness which is new and had orthostatic changes in PCPs office. It is likely secondary to volume depletion and weight loss. Will discontinue diuretics and follow home BP chart. Continue other medications but if the BP stays in the low normal range, will reduce other medications gradually as well. 9/20 tilt table test was negative. Blood pressure is very good and patient is losing some weight. Reduce lisinopril and follow the home chart. As patient says that he will lose more weight. Will adjust medications as needed in future. 2/21 atrial fibrillation is staying in sinus rhythm/sinus bradycardia. QTC and QRS duration are acceptable. Blood pressure is controlled. MR and AI stable. Follow clinically. Repeat echo to follow-up on aortic ectasia as well as MR and AI. Mediterranean diet guidelines printed. Diagnoses and all orders for this visit:    1. Paroxysmal atrial fibrillation (HCC)  -     TSH 3RD GENERATION; Future    2. Essential hypertension  -     LIPID PANEL; Future  -     HEPATIC FUNCTION PANEL; Future  -     CBC W/O DIFF; Future    3. Mitral and aortic regurgitation    4. Aortic ectasia, thoracic (HCC)    5. MARIUSZ on CPAP  Comments:  8/21 not on regular CPAP  Orders:  -     REFERRAL TO PULMONARY DISEASE    6. Obesity (BMI 30.0-34.9)    7. Current use of long term anticoagulation  -     CBC W/O DIFF;  Future        Pertinent laboratory and test data reviewed and discussed with patient. Blood pressure is controlled. MR and AI remains mild by echo. Aortic ectasia is mildly worsening and will be followed by periodic echocardiograms. Discussed with patient and wife and they agree. He is not using CPAP and will be referred to Dr. Saturnino Lockwood. Diet weight and exercise discussed. There is no limitation or supervision needed for exercise. I explained to him and wife that if A. fib is going to come back, we will treated at that time. Mediterranean diet guidelines printed. Labs as ordered unless done by PCP. See patient instructions also for other medical advice given    Medications Discontinued During This Encounter   Medication Reason    carBAMazepine ER (CARBATROL ER) 200 mg capsule Side Effects    HYDROcodone-acetaminophen (NORCO)  mg tablet Alternate Therapy    levETIRAcetam (KEPPRA) 500 mg tablet DISCONTINUED BY ANOTHER CLINICIAN    linaCLOtide (Linzess) 145 mcg cap capsule LIST CLEANUP    tiZANidine (ZANAFLEX) 4 mg capsule Therapy Completed       Follow-up and Dispositions    · Return in about 6 months (around 2/27/2022), or if symptoms worsen or fail to improve, for with ekg, post test, Get labs from PCP including lipids. 8/27/2021 AFib clinically stable.

## 2021-08-27 NOTE — PATIENT INSTRUCTIONS
Medications Discontinued During This Encounter   Medication Reason    carBAMazepine ER (CARBATROL ER) 200 mg capsule Side Effects    HYDROcodone-acetaminophen (NORCO)  mg tablet Alternate Therapy    levETIRAcetam (KEPPRA) 500 mg tablet DISCONTINUED BY ANOTHER CLINICIAN    linaCLOtide (Linzess) 145 mcg cap capsule LIST CLEANUP    tiZANidine (ZANAFLEX) 4 mg capsule Therapy Completed          Learning About the Mediterranean Diet  What is the Mediterranean diet? The Mediterranean diet is a style of eating rather than a diet plan. It features foods eaten in [place], [place], [place], and other countries along the [facility]. It emphasizes eating foods like fish, fruits, vegetables, beans, high-fiber breads and whole grains, nuts, and olive oil. This style of eating includes limited red meat, cheese, and sweets. Why choose the Mediterranean diet? A Mediterranean-style diet may improve heart health. It contains more fat than other heart-healthy diets. But the fats are mainly from nuts, unsaturated oils (such as fish oils and olive oil), and certain nut or seed oils (such as canola, soybean, or flaxseed oil). These fats may help protect the heart and blood vessels. How can you get started on the Mediterranean diet? Here are some things you can do to switch to a more Mediterranean way of eating. What to eat  · Eat a variety of fruits and vegetables each day, such as grapes, blueberries, tomatoes, broccoli, peppers, figs, olives, spinach, eggplant, beans, lentils, and chickpeas. · Eat a variety of whole-grain foods each day, such as oats, brown rice, and whole wheat bread, pasta, and couscous. · Eat fish at least 2 times a week. Try tuna, salmon, mackerel, lake trout, herring, or sardines. · Eat moderate amounts of low-fat dairy products, such as milk, cheese, or yogurt. · Eat moderate amounts of poultry and eggs.   · Choose healthy (unsaturated) fats, such as nuts, olive oil, and certain nut or seed oils like canola, soybean, and flaxseed. · Limit unhealthy (saturated) fats, such as butter, palm oil, and coconut oil. And limit fats found in animal products, such as meat and dairy products made with whole milk. Try to eat red meat only a few times a month in very small amounts. · Limit sweets and desserts to only a few times a week. This includes sugar-sweetened drinks like soda. The Mediterranean diet may also include red wine with your meal1 glass each day for women and up to 2 glasses a day for men. Tips for eating at home  · Use herbs, spices, garlic, lemon zest, and citrus juice instead of salt to add flavor to foods. · Add avocado slices to your sandwich instead of fuller. · Have fish for lunch or dinner instead of red meat. Brush the fish with olive oil, and broil or grill it. · Sprinkle your salad with seeds or nuts instead of cheese. · Cook with olive or canola oil instead of butter or oils that are high in saturated fat. · Switch from 2% milk or whole milk to 1% or fat-free milk. · Dip raw vegetables in a vinaigrette dressing or hummus instead of dips made from mayonnaise or sour cream.  · Have a piece of fruit for dessert instead of a piece of cake. Try baked apples, or have some dried fruit. Tips for eating out  · Try broiled, grilled, baked, or poached fish instead of having it fried or breaded. · Ask your  to have your meals prepared with olive oil instead of butter. · Order dishes made with marinara sauce or sauces made from olive oil. Avoid sauces made from cream or mayonnaise. · Choose whole-grain breads, whole wheat pasta and pizza crust, brown rice, beans, and lentils. · Cut back on butter or margarine on bread. Instead, you can dip your bread in a small amount of olive oil. · Ask for a side salad or grilled vegetables instead of french fries or chips. Where can you learn more?   Go to http://www.gray.com/  Enter O407 in the search box to learn more about \"Learning About the Mediterranean Diet. \"  Current as of: December 17, 2020               Content Version: 12.8  © 2006-2021 [employer]. Care instructions adapted under license by [employer] (which disclaims liability or warranty for this information). If you have questions about a medical condition or this instruction, always ask your healthcare professional. [address] any warranty or liability for your use of this information.

## 2021-08-27 NOTE — PROGRESS NOTES
1. Have you been to the ER, urgent care clinic since your last visit? Hospitalized since your last visit? No    2. Have you seen or consulted any other health care providers outside of the [address] [place] [address] since your last visit? Include any pap smears or colon screening. Yes Where: Neurology Headaches/ PCP Routine/  Pain Management Routine      3. Since your last visit, have you had any of the following symptoms?      swelling in legs/arms. 4.  Have you had any blood work, X-rays or cardiac testing? Yes Where: PCP      Requested: NO     In [facility]: Yes    5. Where do you normally have your labs drawn? PCP / [facility]    6. Do you need any refills today?    No

## 2021-10-04 DIAGNOSIS — I48.0 PAROXYSMAL ATRIAL FIBRILLATION (HCC): ICD-10-CM

## 2021-10-04 RX ORDER — PROPAFENONE HYDROCHLORIDE 300 MG/1
TABLET, COATED ORAL
Qty: 270 TABLET | Refills: 0 | Status: SHIPPED | OUTPATIENT
Start: 2021-10-04 | End: 2022-01-06

## 2021-10-07 ENCOUNTER — TELEPHONE (OUTPATIENT)
Dept: CARDIOLOGY CLINIC | Age: 66
End: 2021-10-07

## 2021-10-07 NOTE — TELEPHONE ENCOUNTER
This is a patient of Dr Mildred Celestin. Can he come off Eliquis for a full mouth tooth extraction ?

## 2021-11-09 RX ORDER — APIXABAN 5 MG/1
TABLET, FILM COATED ORAL
Qty: 60 TABLET | Refills: 0 | Status: SHIPPED | OUTPATIENT
Start: 2021-11-09 | End: 2021-12-13

## 2021-12-13 RX ORDER — APIXABAN 5 MG/1
TABLET, FILM COATED ORAL
Qty: 60 TABLET | Refills: 0 | Status: SHIPPED | OUTPATIENT
Start: 2021-12-13 | End: 2022-01-06 | Stop reason: SDUPTHER

## 2022-01-06 ENCOUNTER — OFFICE VISIT (OUTPATIENT)
Dept: CARDIOLOGY CLINIC | Age: 67
End: 2022-01-06
Payer: MEDICARE

## 2022-01-06 VITALS
WEIGHT: 271 LBS | HEART RATE: 65 BPM | HEIGHT: 74 IN | OXYGEN SATURATION: 98 % | BODY MASS INDEX: 34.78 KG/M2 | SYSTOLIC BLOOD PRESSURE: 129 MMHG | DIASTOLIC BLOOD PRESSURE: 87 MMHG

## 2022-01-06 DIAGNOSIS — I48.0 PAROXYSMAL ATRIAL FIBRILLATION (HCC): Primary | ICD-10-CM

## 2022-01-06 DIAGNOSIS — Z99.89 OSA ON CPAP: ICD-10-CM

## 2022-01-06 DIAGNOSIS — I10 ESSENTIAL HYPERTENSION: ICD-10-CM

## 2022-01-06 DIAGNOSIS — I08.0 MITRAL AND AORTIC REGURGITATION: ICD-10-CM

## 2022-01-06 DIAGNOSIS — E66.9 OBESITY (BMI 30.0-34.9): ICD-10-CM

## 2022-01-06 DIAGNOSIS — G47.33 OSA ON CPAP: ICD-10-CM

## 2022-01-06 DIAGNOSIS — I77.810 AORTIC ECTASIA, THORACIC (HCC): ICD-10-CM

## 2022-01-06 DIAGNOSIS — Z79.01 CURRENT USE OF LONG TERM ANTICOAGULATION: ICD-10-CM

## 2022-01-06 PROCEDURE — G8754 DIAS BP LESS 90: HCPCS | Performed by: INTERNAL MEDICINE

## 2022-01-06 PROCEDURE — G8752 SYS BP LESS 140: HCPCS | Performed by: INTERNAL MEDICINE

## 2022-01-06 PROCEDURE — 3017F COLORECTAL CA SCREEN DOC REV: CPT | Performed by: INTERNAL MEDICINE

## 2022-01-06 PROCEDURE — G8427 DOCREV CUR MEDS BY ELIG CLIN: HCPCS | Performed by: INTERNAL MEDICINE

## 2022-01-06 PROCEDURE — G8536 NO DOC ELDER MAL SCRN: HCPCS | Performed by: INTERNAL MEDICINE

## 2022-01-06 PROCEDURE — G8417 CALC BMI ABV UP PARAM F/U: HCPCS | Performed by: INTERNAL MEDICINE

## 2022-01-06 PROCEDURE — 99215 OFFICE O/P EST HI 40 MIN: CPT | Performed by: INTERNAL MEDICINE

## 2022-01-06 PROCEDURE — 1101F PT FALLS ASSESS-DOCD LE1/YR: CPT | Performed by: INTERNAL MEDICINE

## 2022-01-06 PROCEDURE — G9717 DOC PT DX DEP/BP F/U NT REQ: HCPCS | Performed by: INTERNAL MEDICINE

## 2022-01-06 RX ORDER — POLYETHYLENE GLYCOL 3350 17 G/17G
POWDER, FOR SOLUTION ORAL
COMMUNITY

## 2022-01-06 RX ORDER — LISINOPRIL 10 MG/1
10 TABLET ORAL DAILY
Qty: 90 TABLET | Refills: 2 | Status: SHIPPED | OUTPATIENT
Start: 2022-01-06 | End: 2022-03-31 | Stop reason: SDUPTHER

## 2022-01-06 RX ORDER — LISINOPRIL 10 MG/1
20 TABLET ORAL DAILY
Qty: 90 TABLET | Refills: 2 | Status: SHIPPED | OUTPATIENT
Start: 2022-01-06 | End: 2022-01-06

## 2022-01-06 RX ORDER — AMIODARONE HYDROCHLORIDE 200 MG/1
200 TABLET ORAL 2 TIMES DAILY
Qty: 60 TABLET | Refills: 0 | Status: SHIPPED | OUTPATIENT
Start: 2022-01-06 | End: 2022-02-03

## 2022-01-06 RX ORDER — AMIODARONE HYDROCHLORIDE 200 MG/1
200 TABLET ORAL DAILY
Qty: 30 TABLET | Refills: 6 | Status: SHIPPED | OUTPATIENT
Start: 2022-02-05 | End: 2022-04-15 | Stop reason: SDUPTHER

## 2022-01-06 RX ORDER — PROPAFENONE HYDROCHLORIDE 300 MG/1
300 TABLET, COATED ORAL 3 TIMES DAILY
Qty: 270 TABLET | Refills: 0 | Status: CANCELLED | OUTPATIENT
Start: 2022-01-06

## 2022-01-06 NOTE — PROGRESS NOTES
HISTORY OF PRESENT ILLNESS  Lenice Cheadle. is a 74 y.o. male. Follow-up of paroxysmal atrial fibrillation, hypertension, MR, BELKYS,    1/22 noted irregular heartbeat and BP monitor for the last 2 to 3 weeks. Feeling tired for last 2 to 3 weeks in addition to chest symptoms as below. 9/19 new patient just moved from [place]. Seen for preop clearance. Has chronic back problems and peripheral neuropathy. History of paroxysmal A. Fib. Last episode 2013 approximately per patient. Has good functional capacity as he can go up 2-3 flights of steps and can walk half to 1 mile on a level ground without stopping.  2/2020 He c/o palpitations with associated SOB and chest pain with flushed sensation. Episodes last about 20 minutes and resolve. He is anticipating right venous surgery in March.  6/2020 Denies palpitations, SOB or chest pain. Follow-up  Pertinent negatives include no chest pain, no headaches and no shortness of breath. Dizziness  The history is provided by the patient. This is a recurrent problem. The current episode started more than 1 week ago (7/20). The problem occurs daily. The problem has been gradually worsening (2-3 wks). Pertinent negatives include no chest pain, no headaches and no shortness of breath. The symptoms are aggravated by standing. Palpitations   The history is provided by the patient (PAF). This is a new problem. Episode onset: 5/19. The problem has been resolved (Since Rythmol started). The problem occurs rarely (2-3/month). On average, each episode lasts 5 minutes. The problem is associated with nothing. Associated symptoms include lower extremity edema. Pertinent negatives include no diaphoresis, no fever, no malaise/fatigue, no chest pain, no claudication, no orthopnea, no PND, no nausea, no vomiting, no headaches, no dizziness, no cough and no shortness of breath. His past medical history is significant for hypertension.    Leg Swelling  The history is provided by the patient. This is a new problem. The current episode started more than 1 week ago (3/19). The problem occurs constantly. The problem has been rapidly improving. Pertinent negatives include no chest pain, no headaches and no shortness of breath. Nothing aggravates the symptoms. Chest Pain (Angina)   The history is provided by the patient. This is a new problem. The current episode started more than 1 week ago (3 weeks ago). The problem occurs constantly. The quality of the pain is described as pressure-like. The pain does not radiate. Associated symptoms include lower extremity edema and palpitations (PAF). Pertinent negatives include no claudication, no cough, no diaphoresis, no dizziness, no fever, no headaches, no malaise/fatigue, no nausea, no orthopnea, no PND, no shortness of breath and no vomiting. Review of Systems   Constitutional: Negative for chills, diaphoresis, fever, malaise/fatigue and weight loss. HENT: Negative for nosebleeds. Eyes: Negative for discharge. Respiratory: Negative for cough, shortness of breath and wheezing. Cardiovascular: Positive for palpitations (PAF) and leg swelling. Negative for chest pain, orthopnea, claudication and PND. Gastrointestinal: Negative for diarrhea, nausea and vomiting. Genitourinary: Negative for dysuria and hematuria. Musculoskeletal: Negative for joint pain. Skin: Negative for rash. Neurological: Negative for dizziness, seizures, loss of consciousness and headaches. Endo/Heme/Allergies: Negative for polydipsia. Does not bruise/bleed easily. Psychiatric/Behavioral: Negative for depression and substance abuse. The patient does not have insomnia.       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    Cancer ([facility] [address]     Depression     Endocrine disease     Hypothyroidism    Fractures ,     ankle left, left wrist    Headache     Hearing loss     Hypertension     Ill-defined condition     Pain stimulator in back    Ill-defined condition     irregular heartbeat    Nausea & vomiting     Psychiatric disorder     depression    Sleep apnea     on cpap    Thyroid disease        Family History   Problem Relation Age of Onset    OSTEOARTHRITIS Mother     Bleeding Prob Mother     Cancer Mother     Headache Mother     Hypertension Mother     Migraines Mother     Stroke Mother 68    OSTEOARTHRITIS Father     Stroke Father 80    OSTEOARTHRITIS Sister     Cancer Sister     Migraines Sister     OSTEOARTHRITIS Brother     Cancer Brother     Headache Brother     Migraines Brother     Stroke Paternal Uncle     Heart Surgery Paternal Uncle 72    Diabetes Other        Social History     Tobacco Use    Smoking status: Former Smoker     Quit date:      Years since quittin.0    Smokeless tobacco: Never Used   Vaping Use    Vaping Use: Never used   Substance Use Topics    Alcohol use: No    Drug use: No        Current Outpatient Medications   Medication Sig    polyethylene glycol (Miralax) 17 gram/dose powder 17 g    Eliquis 5 mg tablet Take 1 tablet by mouth twice daily    propafenone (RYTHMOL) 300 mg tablet TAKE 1 TABLET BY MOUTH THREE TIMES DAILY    cyanocobalamin 1,000 mcg tablet Take 1,000 mcg by mouth daily.     oxyCODONE-acetaminophen (PERCOCET 7.5) 7.5-325 mg per tablet oxycodone-acetaminophen 7.5 mg-325 mg tablet   TAKE 1 TABLET BY MOUTH EVERY 8 HOURS FOR CHRONIC PAIN    gabapentin (NEURONTIN) 300 mg capsule Take 300 mg by mouth three (3) times daily.  diclofenac (VOLTAREN) 1 % gel Apply 2 g to affected area three (3) times daily as needed for Pain.  naloxone (NARCAN) 4 mg/actuation nasal spray Use 1 spray intranasally, then discard. Repeat with new spray every 2 min as needed for opioid overdose symptoms, alternating nostrils.  escitalopram oxalate (LEXAPRO) 20 mg tablet Take 20 mg by mouth daily. Take 1 tablet by mouth once daily in the morning for 6 days then 1 once daily in the morning    busPIRone (BUSPAR) 10 mg tablet TAKE 1 TABLET BY MOUTH THREE TIMES DAILY (Patient taking differently: Take 10 mg by mouth four (4) times daily as needed (anxiety). )    buPROPion SR (WELLBUTRIN SR) 150 mg SR tablet TAKE 1 TABLET BY MOUTH ONCE DAILY    testosterone cypionate (DEPOTESTOTERONE CYPIONATE) 200 mg/mL injection 0.5 mL by IntraMUSCular route every seven (7) days. Max Daily Amount: 100 mg. (Patient taking differently: 100 mg by IntraMUSCular route every fourteen (14) days.)    cholecalciferol (VITAMIN D3) 1,000 unit tablet Take  by mouth daily.  levothyroxine (SYNTHROID) 100 mcg tablet Take 1 Tab by mouth Daily (before breakfast).  lisinopriL (PRINIVIL, ZESTRIL) 10 mg tablet Take 1 tablet by mouth once daily (Patient taking differently: Take 20 mg by mouth daily.)     No current facility-administered medications for this visit.         Past Surgical History:   Procedure Laterality Date    HX BACK SURGERY  10/10/2019    Revision SCS    HX COLONOSCOPY      HX HEENT  2003    Reconstructive surgery on face    HX KNEE REPLACEMENT  2015/2016    bilat knees    HX ORTHOPAEDIC  [id number]    back surgery X4       Visit Vitals  /87 (BP 1 Location: Left upper arm, BP Patient Position: Sitting, BP Cuff Size: Adult)   Pulse 65   Ht 6' 2\" (1.88 m)   Wt 122.9 kg (271 lb)   SpO2 98%   BMI 34.79 kg/m²       Diagnostic Studies:  I have reviewed the relevant tests done on the patient and show as follows  EKG tracings reviewed by me today.    EKG Results     None      1/2006 cardiac catheterization  CONCLUSION:      1. Normal left ventricular cavity size, wall motion ejection fraction. 2. Coronary artery irregularities without significant coronary atrial         stenoses. XR Results (most recent):  Results from Hospital Encounter encounter on 09/29/20    XR SPINE LUMB COMP W BEND    Narrative  Lumbar spine, 7 views including flexion and extension. INDICATION:Radiculopathy, lumbar region. COMPARISON: CT 5/29/2019    FINDINGS:    Status post posterior fusion at L2-L3, L3-L4, and L4-L5. No lucency surrounding  the screws. Flexion-extension views demonstrate degenerative retrolisthesis at  L2-L3. Evaluation of L5-S1 is limited by positioning, but there is at least 10  mm grade 1 anterolisthesis which measures approximately 12 mm with flexion. Impression  IMPRESSION:    1.  Stable postsurgical changes. No acute hardware abnormality. 2.  There is apparent increase in anterolisthesis at L5-S1 although positioning  is suboptimal. Dynamic measurements as above. Cross-sectional imaging may be of  benefit for confirmation. 11/04/19   ECHO ADULT COMPLETE 11/04/2019 11/4/2019    Narrative · Left Ventricle: Normal cavity size and systolic function (ejection   fraction normal). Mildly to moderately increased wall thickness. Estimated   left ventricular ejection fraction is 56 - 60%. Moderate (grade 2) left   ventricular diastolic dysfunction. · Right Ventricle: Normal right ventricular size and function. · Right Atrium: Mildly dilated right atrium. · Mitral Valve: Mitral valve thickening. Mild mitral valve regurgitation   is present. · Aortic Valve: Mild aortic valve regurgitation is present. · Tricuspid Valve: Mild tricuspid valve regurgitation is present. · Pulmonary Artery: There is no evidence of pulmonary hypertension. · Aorta: Mildly dilated aortic root; diameter is 3.9 cm.  Mildly dilated   ascending aorta; diameter is 4 cm.              Signed by: Josafat Vincent MD     12/04/19   NUCLEAR CARDIAC STRESS TEST 12/04/2019 12/4/2019    Narrative · Baseline ECG: Normal EKG, myocardial infarction. The infarction is   located in the inferior regions. .  · Negative stress test.  · Gated SPECT: Left ventricular function post-stress was normal.   Calculated ejection fraction is 64%. There is no evidence of transient   ischemic dilation (TID). The TID ratio is 0.88. · Left ventricular perfusion is normal.  · Negative myocardial perfusion imaging. Myocardial perfusion imaging   supports a low risk stress test.        Signed by: Richelle Wong MD   10/19 cardiac event monitor  Paroxysmal atrial flutter. 7/20 carotid duplex  Interpretation Summary  No significant obstructive lesions noted in the right extracranial carotid  system. < 50% left internal carotid artery stenosis. Antegrade flow noted in the vertebral arteries. Normal flow noted in the subclavian arteries. 8/20 Tilt Table  CONCLUSION: Negative tilt table test including sublingual nitroglycerin  after a fluid bolus. 8/21 echo  Interpretation Summary    · LV: Estimated LVEF is 60 - 65%. Normal cavity size and systolic function (ejection fraction normal). Mild concentric hypertrophy. Wall motion: normal. Moderate (grade 2) left ventricular diastolic dysfunction. · LA: Left Atrium volume index is 27.92 mL/m2. · RA: Mildly dilated right atrium. · AV: Mild aortic valve regurgitation is present. · MV: Mild mitral valve regurgitation is present. · TV: Mild tricuspid valve regurgitation is present. Right Ventricular Arterial Pressure (RVSP) is 36 mmHg. Pulmonary hypertension not suggested by Doppler findings. · PV: Mild pulmonic valve regurgitation is present. · AO: Aortic root is 4.2 cm, ascending aorta 4.2 cm.       Comparison Study Information    Prior Study    When compared to the previous study from 11/4/19, aortic root diameter has increased from 3.9 cm and the ascending aorta diameter has increased from 4.0 cm. Mr. Juan R Arredondo has a reminder for a \"due or due soon\" health maintenance. I have asked that he contact his primary care provider for follow-up on this health maintenance. Physical Exam  Constitutional:       General: He is not in acute distress. Appearance: He is well-developed. HENT:      Head: Normocephalic and atraumatic. Mouth/Throat:      Dentition: Normal dentition. Eyes:      General: No scleral icterus. Right eye: No discharge. Left eye: No discharge. Neck:      Thyroid: No thyromegaly. Vascular: No carotid bruit or JVD. Cardiovascular:      Rate and Rhythm: Normal rate and regular rhythm. Pulses: Intact distal pulses. Heart sounds: Normal heart sounds, S1 normal and S2 normal. No murmur heard. No friction rub. No gallop. Pulmonary:      Effort: Pulmonary effort is normal.      Breath sounds: Normal breath sounds. No wheezing or rales. Abdominal:      Palpations: Abdomen is soft. There is no mass. Tenderness: There is no abdominal tenderness. Musculoskeletal:      Cervical back: Neck supple. Lymphadenopathy:      Cervical:      Right cervical: No superficial cervical adenopathy. Left cervical: No superficial cervical adenopathy. Skin:     General: Skin is warm and dry. Findings: No rash. Neurological:      Mental Status: He is alert and oriented to person, place, and time. Psychiatric:         Behavior: Behavior normal.         ASSESSMENT and PLAN    History of paroxysmal A. Fib. Last episode 2013 approximately per patient. Mitral and aortic regurgitation: Mild 10/19; 8/21; Aortic ectasia: 11/19 aortic root 3.9, ASC ao 4.0; 8/21 ao root 4.2, ASC ao 4.2;    Results for Geoffrey RICHARDSON. Galileo Camarenaes (MRN [id number]) as of 9/13/2019 11:15   Ref.  Range 2/1/2019 08:49   Triglyceride Latest Ref Range: <150 MG/DL 63   Cholesterol, total Latest Ref Range: <200 MG/   HDL Cholesterol Latest Ref Range: 40 - 60 MG/DL 46   CHOL/HDL Ratio Latest Ref Range: 0 - 5.0   3.0   LDL, calculated Latest Ref Range: 0 - 100 MG/DL 77.4   VLDL, calculated Latest Units: MG/DL 12.6       2/2020  Continues to c/o palpitations with SOB, dizziness and flushed sensation. These occur daily and last 5-20 minutes. Will increase propafenone to 300 mg/ TID. Check EKG in 1 week. If palpitations continue, will evaluate for arrhythmias with Holter monitor. To resume lasix and compression stockings for edema. F/U with vascular as scheduled. Advised to wear CPAP consistently, this may improve SOB and palpitations. EKG - SR mild increase in QRS. Repeat EKG in 1 week. 6/2020  Reports palpitations have resolved with increased dose of prpafenone to 300 mg / TID. EKG with  - will monitor and repeat EKG in 3 months. He has lost 23 pounds since LOV and was congratulated. Diet and exercise encouraged to promote further weight loss. Continues to have edema to RLE. He is to have venous closure to saphenous vein of right leg.    7/20 patient complains of mild dizziness which is new and had orthostatic changes in PCPs office. It is likely secondary to volume depletion and weight loss. Will discontinue diuretics and follow home BP chart. Continue other medications but if the BP stays in the low normal range, will reduce other medications gradually as well. 9/20 tilt table test was negative. Blood pressure is very good and patient is losing some weight. Reduce lisinopril and follow the home chart. As patient says that he will lose more weight. Will adjust medications as needed in future. 2/21 atrial fibrillation is staying in sinus rhythm/sinus bradycardia. QTC and QRS duration are acceptable. Blood pressure is controlled. MR and AI stable. Follow clinically. Repeat echo to follow-up on aortic ectasia as well as MR and AI. Mediterranean diet guidelines printed.   8/27/2021 AFib clinically stable. Diagnoses and all orders for this visit:    1. Paroxysmal atrial fibrillation (HCC)  -     apixaban (Eliquis) 5 mg tablet; Take 1 Tablet by mouth two (2) times a day. -     NUCLEAR CARDIAC STRESS TEST; Future  -     amiodarone (CORDARONE) 200 mg tablet; Take 1 Tablet by mouth two (2) times a day. 2. Essential hypertension  -     lisinopriL (PRINIVIL, ZESTRIL) 10 mg tablet; Take 1 Tablet by mouth daily. 3. Mitral and aortic regurgitation    4. Aortic ectasia, thoracic (HCC)    5. MARIUSZ on CPAP    6. Obesity (BMI 30.0-34.9)    7. Current use of long term anticoagulation        Pertinent laboratory and test data reviewed and discussed with patient. See patient instructions also for other medical advice given    Medications Discontinued During This Encounter   Medication Reason    amLODIPine (NORVASC) 5 mg tablet Not A Current Medication    esomeprazole (NEXIUM) 40 mg capsule Therapy Completed    propafenone (RYTHMOL) 300 mg tablet Other    escitalopram oxalate (LEXAPRO) 20 mg tablet Other    lisinopriL (PRINIVIL, ZESTRIL) 10 mg tablet REORDER    Eliquis 5 mg tablet REORDER    lisinopriL (PRINIVIL, ZESTRIL) 10 mg tablet        Follow-up and Dispositions    · Return in about 6 weeks (around 2/17/2022), or if symptoms worsen or fail to improve, for post test.       1/6/2022 A. fib has recurred. Likely because he is not using his CPAP regularly and has gained more weight which are significant factors. We will discontinue propafenone and Colletta propafenone failure for now. Start amiodarone. Side effect profile discussed and need to monitor closely. Continue Eliquis. Will need to discontinue Lexapro due to amiodarone and possible QT interval increase. Diet weight discussed and Mediterranean diet guidelines given. MR and AI stable and mild. Follow clinically. Check a stress test to evaluate any ischemia due to recurrent A. fib and atypical chest pain.

## 2022-01-06 NOTE — PROGRESS NOTES
1. Have you been to the ER, urgent care clinic since your last visit? Hospitalized since your last visit? No     2. Have you seen or consulted any other health care providers outside of the [address] [place] [address] since your last visit? Include any pap smears or colon screening. Yes Where: [place] doctor for his hearing     3. Since your last visit, have you had any of the following symptoms? chest pains and palpitations. 4.  Have you had any blood work, X-rays or cardiac testing? Yes Where: PCP     Requested: YES     In [facility]: NO    5. Where do you normally have your labs drawn? PCP    6. Do you need any refills today?    Yes

## 2022-01-06 NOTE — PATIENT INSTRUCTIONS
Medications Discontinued During This Encounter   Medication Reason    amLODIPine (NORVASC) 5 mg tablet Not A Current Medication    esomeprazole (NEXIUM) 40 mg capsule Therapy Completed    propafenone (RYTHMOL) 300 mg tablet Other    escitalopram oxalate (LEXAPRO) 20 mg tablet Other    lisinopriL (PRINIVIL, ZESTRIL) 10 mg tablet REORDER    Eliquis 5 mg tablet REORDER    lisinopriL (PRINIVIL, ZESTRIL) 10 mg tablet           Learning About the Mediterranean Diet  What is the Mediterranean diet? The Mediterranean diet is a style of eating rather than a diet plan. It features foods eaten in [place], [place], [place], and other countries along the [facility]. It emphasizes eating foods like fish, fruits, vegetables, beans, high-fiber breads and whole grains, nuts, and olive oil. This style of eating includes limited red meat, cheese, and sweets. Why choose the Mediterranean diet? A Mediterranean-style diet may improve heart health. It contains more fat than other heart-healthy diets. But the fats are mainly from nuts, unsaturated oils (such as fish oils and olive oil), and certain nut or seed oils (such as canola, soybean, or flaxseed oil). These fats may help protect the heart and blood vessels. How can you get started on the Mediterranean diet? Here are some things you can do to switch to a more Mediterranean way of eating. What to eat  · Eat a variety of fruits and vegetables each day, such as grapes, blueberries, tomatoes, broccoli, peppers, figs, olives, spinach, eggplant, beans, lentils, and chickpeas. · Eat a variety of whole-grain foods each day, such as oats, brown rice, and whole wheat bread, pasta, and couscous. · Eat fish at least 2 times a week. Try tuna, salmon, mackerel, lake trout, herring, or sardines. · Eat moderate amounts of low-fat dairy products, such as milk, cheese, or yogurt. · Eat moderate amounts of poultry and eggs.   · Choose healthy (unsaturated) fats, such as nuts, olive oil, and certain nut or seed oils like canola, soybean, and flaxseed. · Limit unhealthy (saturated) fats, such as butter, palm oil, and coconut oil. And limit fats found in animal products, such as meat and dairy products made with whole milk. Try to eat red meat only a few times a month in very small amounts. · Limit sweets and desserts to only a few times a week. This includes sugar-sweetened drinks like soda. The Mediterranean diet may also include red wine with your meal1 glass each day for women and up to 2 glasses a day for men. Tips for eating at home  · Use herbs, spices, garlic, lemon zest, and citrus juice instead of salt to add flavor to foods. · Add avocado slices to your sandwich instead of fuller. · Have fish for lunch or dinner instead of red meat. Brush the fish with olive oil, and broil or grill it. · Sprinkle your salad with seeds or nuts instead of cheese. · Cook with olive or canola oil instead of butter or oils that are high in saturated fat. · Switch from 2% milk or whole milk to 1% or fat-free milk. · Dip raw vegetables in a vinaigrette dressing or hummus instead of dips made from mayonnaise or sour cream.  · Have a piece of fruit for dessert instead of a piece of cake. Try baked apples, or have some dried fruit. Tips for eating out  · Try broiled, grilled, baked, or poached fish instead of having it fried or breaded. · Ask your  to have your meals prepared with olive oil instead of butter. · Order dishes made with marinara sauce or sauces made from olive oil. Avoid sauces made from cream or mayonnaise. · Choose whole-grain breads, whole wheat pasta and pizza crust, brown rice, beans, and lentils. · Cut back on butter or margarine on bread. Instead, you can dip your bread in a small amount of olive oil. · Ask for a side salad or grilled vegetables instead of french fries or chips. Where can you learn more?   Go to http://www.gray.com/  Enter O407 in the search box to learn more about \"Learning About the Mediterranean Diet. \"  Current as of: December 17, 2020               Content Version: 13.0  © 8966-5564 [employer]. Care instructions adapted under license by [employer] (which disclaims liability or warranty for this information). If you have questions about a medical condition or this instruction, always ask your healthcare professional. [place] [address] any warranty or liability for your use of this information.

## 2022-02-07 DIAGNOSIS — I48.0 PAROXYSMAL ATRIAL FIBRILLATION (HCC): ICD-10-CM

## 2022-02-09 RX ORDER — APIXABAN 5 MG/1
TABLET, FILM COATED ORAL
Qty: 60 TABLET | Refills: 0 | Status: SHIPPED | OUTPATIENT
Start: 2022-02-09 | End: 2022-03-05

## 2022-03-04 ENCOUNTER — TELEPHONE (OUTPATIENT)
Dept: CARDIOLOGY CLINIC | Age: 67
End: 2022-03-04

## 2022-03-04 ENCOUNTER — OFFICE VISIT (OUTPATIENT)
Dept: CARDIOLOGY CLINIC | Age: 67
End: 2022-03-04
Payer: MEDICARE

## 2022-03-04 VITALS
DIASTOLIC BLOOD PRESSURE: 85 MMHG | HEART RATE: 72 BPM | HEIGHT: 74 IN | WEIGHT: 279 LBS | BODY MASS INDEX: 35.81 KG/M2 | SYSTOLIC BLOOD PRESSURE: 137 MMHG | OXYGEN SATURATION: 97 %

## 2022-03-04 DIAGNOSIS — I10 ESSENTIAL HYPERTENSION: ICD-10-CM

## 2022-03-04 DIAGNOSIS — I08.0 MITRAL AND AORTIC REGURGITATION: ICD-10-CM

## 2022-03-04 DIAGNOSIS — Z79.01 CURRENT USE OF LONG TERM ANTICOAGULATION: ICD-10-CM

## 2022-03-04 DIAGNOSIS — I77.810 AORTIC ECTASIA, THORACIC (HCC): ICD-10-CM

## 2022-03-04 DIAGNOSIS — I48.3 TYPICAL ATRIAL FLUTTER (HCC): Primary | ICD-10-CM

## 2022-03-04 DIAGNOSIS — E66.01 SEVERE OBESITY (BMI 35.0-39.9) WITH COMORBIDITY (HCC): ICD-10-CM

## 2022-03-04 DIAGNOSIS — Z99.89 OSA ON CPAP: ICD-10-CM

## 2022-03-04 DIAGNOSIS — G47.33 OSA ON CPAP: ICD-10-CM

## 2022-03-04 PROCEDURE — 3017F COLORECTAL CA SCREEN DOC REV: CPT | Performed by: INTERNAL MEDICINE

## 2022-03-04 PROCEDURE — 1101F PT FALLS ASSESS-DOCD LE1/YR: CPT | Performed by: INTERNAL MEDICINE

## 2022-03-04 PROCEDURE — 93000 ELECTROCARDIOGRAM COMPLETE: CPT | Performed by: INTERNAL MEDICINE

## 2022-03-04 PROCEDURE — 99214 OFFICE O/P EST MOD 30 MIN: CPT | Performed by: INTERNAL MEDICINE

## 2022-03-04 PROCEDURE — G8754 DIAS BP LESS 90: HCPCS | Performed by: INTERNAL MEDICINE

## 2022-03-04 PROCEDURE — G8417 CALC BMI ABV UP PARAM F/U: HCPCS | Performed by: INTERNAL MEDICINE

## 2022-03-04 PROCEDURE — G9717 DOC PT DX DEP/BP F/U NT REQ: HCPCS | Performed by: INTERNAL MEDICINE

## 2022-03-04 PROCEDURE — G8427 DOCREV CUR MEDS BY ELIG CLIN: HCPCS | Performed by: INTERNAL MEDICINE

## 2022-03-04 PROCEDURE — G8752 SYS BP LESS 140: HCPCS | Performed by: INTERNAL MEDICINE

## 2022-03-04 PROCEDURE — G8536 NO DOC ELDER MAL SCRN: HCPCS | Performed by: INTERNAL MEDICINE

## 2022-03-04 RX ORDER — LEVOTHYROXINE SODIUM 100 UG/1
TABLET ORAL
COMMUNITY

## 2022-03-04 NOTE — TELEPHONE ENCOUNTER
----- Message from Krista Mario MD sent at 3/4/2022  3:28 PM EST -----  Please contact patient and do the following asap    Tell patient to see PCP for elevated total bilirubin. Fax these results to PCP also

## 2022-03-04 NOTE — H&P (VIEW-ONLY)
Please contact patient and do the following asap    Tell patient to see PCP for elevated total bilirubin. Fax these results to PCP also

## 2022-03-04 NOTE — PROGRESS NOTES
1. Have you been to the ER, urgent care clinic since your last visit? Hospitalized since your last visit? No    2. Have you seen or consulted any other health care providers outside of the [address] [place] [address] since your last visit? Include any pap smears or colon screening. [facility], [address] [facility] doctor for hearing. 3.  Since your last visit, have you had any of the following symptoms? shortness of breath, dizziness and swelling in legs/arms. 4.  Have you had any blood work, X-rays or cardiac testing? Yes Where:  Blood work done at [facility]. .: NO    5. Where do you normally have your labs drawn? PCP    6. Do you need any refills today?    Yes

## 2022-03-04 NOTE — TELEPHONE ENCOUNTER
Called patient regarding lab/test per Dr. Waller Seats, lab reviewed. Tell patient to see PCP for elevated total bilirubin. Fax these results to PCP also. Attempted to reach patient on all phone numbers and they all were busy will continue to try to reach patient.

## 2022-03-04 NOTE — PROGRESS NOTES
HISTORY OF PRESENT ILLNESS  Ryan Fontanez is a 74 y.o. male. Follow-up of paroxysmal atrial fibrillation, hypertension, MR, BELKYS,    1/22 noted irregular heartbeat and BP monitor for the last 2 to 3 weeks. Feeling tired for last 2 to 3 weeks in addition to chest symptoms as below. 9/19 new patient just moved from [place]. Seen for preop clearance. Has chronic back problems and peripheral neuropathy. History of paroxysmal A. Fib. Last episode 2013 approximately per patient. Has good functional capacity as he can go up 2-3 flights of steps and can walk half to 1 mile on a level ground without stopping.  2/2020 He c/o palpitations with associated SOB and chest pain with flushed sensation. Episodes last about 20 minutes and resolve. He is anticipating right venous surgery in March.  6/2020 Denies palpitations, SOB or chest pain. Follow-up  Associated symptoms include chest pain. Pertinent negatives include no headaches and no shortness of breath. Dizziness  The history is provided by the patient. This is a recurrent problem. The current episode started more than 1 week ago (7/20). The problem occurs rarely (<1/wk). The problem has been rapidly improving. Associated symptoms include chest pain. Pertinent negatives include no headaches and no shortness of breath. The symptoms are aggravated by standing. Palpitations   The history is provided by the patient (PAF). This is a recurrent problem. Episode onset: 5/19. Progression since onset: Since Rythmol started. The problem occurs rarely (2-3/month). On average, each episode lasts 5 minutes. The problem is associated with nothing. Associated symptoms include chest pain, lower extremity edema and dizziness. Pertinent negatives include no diaphoresis, no fever, no malaise/fatigue, no claudication, no orthopnea, no PND, no nausea, no vomiting, no headaches, no cough and no shortness of breath. His past medical history is significant for hypertension. Leg Swelling  The history is provided by the patient. This is a new problem. The current episode started more than 1 week ago (3/19). The problem occurs constantly. The problem has not changed since onset. Associated symptoms include chest pain. Pertinent negatives include no headaches and no shortness of breath. Nothing aggravates the symptoms. Chest Pain (Angina)   The history is provided by the patient. This is a new problem. The current episode started more than 1 week ago (3 weeks ago). The problem has been rapidly improving. The problem occurs constantly. The quality of the pain is described as pressure-like. The pain does not radiate. Associated symptoms include dizziness, lower extremity edema and palpitations (PAF). Pertinent negatives include no claudication, no cough, no diaphoresis, no fever, no headaches, no malaise/fatigue, no nausea, no orthopnea, no PND, no shortness of breath and no vomiting. Review of Systems   Constitutional: Negative for chills, diaphoresis, fever, malaise/fatigue and weight loss. HENT: Negative for nosebleeds. Eyes: Negative for discharge. Respiratory: Negative for cough, shortness of breath and wheezing. Cardiovascular: Positive for chest pain, palpitations (PAF) and leg swelling. Negative for orthopnea, claudication and PND. Gastrointestinal: Negative for diarrhea, nausea and vomiting. Genitourinary: Negative for dysuria and hematuria. Musculoskeletal: Negative for joint pain. Skin: Negative for rash. Neurological: Positive for dizziness. Negative for seizures, loss of consciousness and headaches. Endo/Heme/Allergies: Negative for polydipsia. Does not bruise/bleed easily. Psychiatric/Behavioral: Negative for depression and substance abuse. The patient does not have insomnia.       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    Cancer ([facility]     Depression     Endocrine disease     Hypothyroidism    Fractures ,     ankle left, left wrist    Headache     Hearing loss     Hypertension     Ill-defined condition     Pain stimulator in back    Ill-defined condition     irregular heartbeat    Nausea & vomiting     Psychiatric disorder     depression    Sleep apnea     on cpap    Thyroid disease        Family History   Problem Relation Age of Onset    OSTEOARTHRITIS Mother     Bleeding Prob Mother     Cancer Mother     Headache Mother     Hypertension Mother     Migraines Mother     Stroke Mother 68    OSTEOARTHRITIS Father     Stroke Father 80    OSTEOARTHRITIS Sister     Cancer Sister     Migraines Sister     OSTEOARTHRITIS Brother     Cancer Brother     Headache Brother     Migraines Brother     Stroke Paternal Uncle     Heart Surgery Paternal Uncle 72    Diabetes Other        Social History     Tobacco Use    Smoking status: Former Smoker     Quit date:      Years since quittin.1    Smokeless tobacco: Never Used   Vaping Use    Vaping Use: Never used   Substance Use Topics    Alcohol use: No    Drug use: No        Current Outpatient Medications   Medication Sig    Eliquis 5 mg tablet Take 1 tablet by mouth twice daily    polyethylene glycol (Miralax) 17 gram/dose powder 17 g    lisinopriL (PRINIVIL, ZESTRIL) 10 mg tablet Take 1 Tablet by mouth daily.  amiodarone (CORDARONE) 200 mg tablet Take 1 Tablet by mouth daily.  cyanocobalamin 1,000 mcg tablet Take 1,000 mcg by mouth daily.     oxyCODONE-acetaminophen (PERCOCET 7.5) 7.5-325 mg per tablet oxycodone-acetaminophen 7.5 mg-325 mg tablet   TAKE 1 TABLET BY MOUTH EVERY 8 HOURS FOR CHRONIC PAIN    gabapentin (NEURONTIN) 300 mg capsule Take 300 mg by mouth three (3) times daily.  diclofenac (VOLTAREN) 1 % gel Apply 2 g to affected area three (3) times daily as needed for Pain.  naloxone (NARCAN) 4 mg/actuation nasal spray Use 1 spray intranasally, then discard. Repeat with new spray every 2 min as needed for opioid overdose symptoms, alternating nostrils.  busPIRone (BUSPAR) 10 mg tablet TAKE 1 TABLET BY MOUTH THREE TIMES DAILY (Patient taking differently: Take 10 mg by mouth four (4) times daily as needed (anxiety). )    buPROPion SR (WELLBUTRIN SR) 150 mg SR tablet TAKE 1 TABLET BY MOUTH ONCE DAILY    testosterone cypionate (DEPOTESTOTERONE CYPIONATE) 200 mg/mL injection 0.5 mL by IntraMUSCular route every seven (7) days. Max Daily Amount: 100 mg. (Patient taking differently: 100 mg by IntraMUSCular route every fourteen (14) days.)    cholecalciferol (VITAMIN D3) 1,000 unit tablet Take  by mouth daily.  levothyroxine (SYNTHROID) 100 mcg tablet Take 1 Tab by mouth Daily (before breakfast). No current facility-administered medications for this visit. Past Surgical History:   Procedure Laterality Date    HX BACK SURGERY  10/10/2019    Revision SCS    HX COLONOSCOPY      HX HEENT  2003    Reconstructive surgery on face    HX KNEE REPLACEMENT  2015/2016    bilat knees    HX ORTHOPAEDIC  [id number]    back surgery X4       Visit Vitals  Ht 6' 2\" (1.88 m)   Wt 126.6 kg (279 lb)   BMI 35.82 kg/m²       Diagnostic Studies:  I have reviewed the relevant tests done on the patient and show as follows  EKG tracings reviewed by me today. EKG Results     None      1/2006 cardiac catheterization  CONCLUSION:      1. Normal left ventricular cavity size, wall motion ejection fraction.       2. Coronary artery irregularities without significant coronary atrial stenoses. XR Results (most recent):  Results from Hospital Encounter encounter on 09/29/20    XR SPINE LUMB COMP W BEND    Narrative  Lumbar spine, 7 views including flexion and extension. INDICATION:Radiculopathy, lumbar region. COMPARISON: CT 5/29/2019    FINDINGS:    Status post posterior fusion at L2-L3, L3-L4, and L4-L5. No lucency surrounding  the screws. Flexion-extension views demonstrate degenerative retrolisthesis at  L2-L3. Evaluation of L5-S1 is limited by positioning, but there is at least 10  mm grade 1 anterolisthesis which measures approximately 12 mm with flexion. Impression  IMPRESSION:    1.  Stable postsurgical changes. No acute hardware abnormality. 2.  There is apparent increase in anterolisthesis at L5-S1 although positioning  is suboptimal. Dynamic measurements as above. Cross-sectional imaging may be of  benefit for confirmation. 11/04/19   ECHO ADULT COMPLETE 11/04/2019 11/4/2019    Narrative · Left Ventricle: Normal cavity size and systolic function (ejection   fraction normal). Mildly to moderately increased wall thickness. Estimated   left ventricular ejection fraction is 56 - 60%. Moderate (grade 2) left   ventricular diastolic dysfunction. · Right Ventricle: Normal right ventricular size and function. · Right Atrium: Mildly dilated right atrium. · Mitral Valve: Mitral valve thickening. Mild mitral valve regurgitation   is present. · Aortic Valve: Mild aortic valve regurgitation is present. · Tricuspid Valve: Mild tricuspid valve regurgitation is present. · Pulmonary Artery: There is no evidence of pulmonary hypertension. · Aorta: Mildly dilated aortic root; diameter is 3.9 cm. Mildly dilated   ascending aorta; diameter is 4 cm. Signed by: Nii Fajardo MD     12/04/19   NUCLEAR CARDIAC STRESS TEST 12/04/2019 12/4/2019    Narrative · Baseline ECG: Normal EKG, myocardial infarction. The infarction is   located in the inferior regions.  .  · Negative stress test.  · Gated SPECT: Left ventricular function post-stress was normal.   Calculated ejection fraction is 64%. There is no evidence of transient   ischemic dilation (TID). The TID ratio is 0.88. · Left ventricular perfusion is normal.  · Negative myocardial perfusion imaging. Myocardial perfusion imaging   supports a low risk stress test.        Signed by: Bairon Benoit MD   10/19 cardiac event monitor  Paroxysmal atrial flutter. 7/20 carotid duplex  Interpretation Summary  No significant obstructive lesions noted in the right extracranial carotid  system. < 50% left internal carotid artery stenosis. Antegrade flow noted in the vertebral arteries. Normal flow noted in the subclavian arteries. 8/20 Tilt Table  CONCLUSION: Negative tilt table test including sublingual nitroglycerin  after a fluid bolus. 8/21 echo  Interpretation Summary    · LV: Estimated LVEF is 60 - 65%. Normal cavity size and systolic function (ejection fraction normal). Mild concentric hypertrophy. Wall motion: normal. Moderate (grade 2) left ventricular diastolic dysfunction. · LA: Left Atrium volume index is 27.92 mL/m2. · RA: Mildly dilated right atrium. · AV: Mild aortic valve regurgitation is present. · MV: Mild mitral valve regurgitation is present. · TV: Mild tricuspid valve regurgitation is present. Right Ventricular Arterial Pressure (RVSP) is 36 mmHg. Pulmonary hypertension not suggested by Doppler findings. · PV: Mild pulmonic valve regurgitation is present. · AO: Aortic root is 4.2 cm, ascending aorta 4.2 cm. Comparison Study Information    Prior Study    When compared to the previous study from 11/4/19, aortic root diameter has increased from 3.9 cm and the ascending aorta diameter has increased from 4.0 cm.   01/13/22    NUCLEAR CARDIAC STRESS TEST 01/13/2022 1/14/2022    Interpretation Summary    Nuclear Findings: Normal left ventricular systolic function post-stress. LVEF measures 77%.    Nuclear Findings: Normal pharmacological myocardial perfusion study. LVEF measures 77%.   Nuclear Findings: LVEF measures 77%. LV perfusion is normal.    ECG: Resting ECG demonstrates atrial flutter.   ECG: Stress ECG was negative for ischemia.   Stress Test: A pharmacological stress test was performed using regadenoson. Hemodynamics are adequate for diagnosis. Blood pressure demonstrated a normal response and heart rate demonstrated a normal response to stress. Signed by: Davon Paniagua MD on 1/13/2022 12:08 PM    Mr. Tricia Esteban has a reminder for a \"due or due soon\" health maintenance. I have asked that he contact his primary care provider for follow-up on this health maintenance. Physical Exam  Constitutional:       General: He is not in acute distress. Appearance: He is well-developed. He is obese. HENT:      Head: Normocephalic and atraumatic. Mouth/Throat:      Dentition: Normal dentition. Eyes:      General: No scleral icterus. Right eye: No discharge. Left eye: No discharge. Neck:      Thyroid: No thyromegaly. Vascular: No carotid bruit or JVD. Cardiovascular:      Rate and Rhythm: Normal rate and regular rhythm. Pulses: Intact distal pulses. Heart sounds: Normal heart sounds, S1 normal and S2 normal. No murmur heard. No friction rub. No gallop. Pulmonary:      Effort: Pulmonary effort is normal.      Breath sounds: Normal breath sounds. No wheezing or rales. Abdominal:      Palpations: Abdomen is soft. There is no mass. Tenderness: There is no abdominal tenderness. Musculoskeletal:      Cervical back: Neck supple. Right lower leg: No edema. Left lower leg: No edema. Lymphadenopathy:      Cervical:      Right cervical: No superficial cervical adenopathy. Left cervical: No superficial cervical adenopathy. Skin:     General: Skin is warm and dry. Findings: No rash.    Neurological:      Mental Status: He is alert and oriented to person, place, and time. Psychiatric:         Behavior: Behavior normal.         ASSESSMENT and PLAN    History of paroxysmal A. Fib. Last episode 2013 approximately per patient. Mitral and aortic regurgitation: Mild 10/19; 8/21; Aortic ectasia: 11/19 aortic root 3.9, ASC ao 4.0; 8/21 ao root 4.2, ASC ao 4.2;    Results for Rey RICHARDSON. Martha Nguyen (MRN [id number]) as of 9/13/2019 11:15   Ref. Range 2/1/2019 08:49   Triglyceride Latest Ref Range: <150 MG/DL 63   Cholesterol, total Latest Ref Range: <200 MG/   HDL Cholesterol Latest Ref Range: 40 - 60 MG/DL 46   CHOL/HDL Ratio Latest Ref Range: 0 - 5.0   3.0   LDL, calculated Latest Ref Range: 0 - 100 MG/DL 77.4   VLDL, calculated Latest Units: MG/DL 12.6       2/2020  Continues to c/o palpitations with SOB, dizziness and flushed sensation. These occur daily and last 5-20 minutes. Will increase propafenone to 300 mg/ TID. Check EKG in 1 week. If palpitations continue, will evaluate for arrhythmias with Holter monitor. To resume lasix and compression stockings for edema. F/U with vascular as scheduled. Advised to wear CPAP consistently, this may improve SOB and palpitations. EKG - SR mild increase in QRS. Repeat EKG in 1 week. 6/2020  Reports palpitations have resolved with increased dose of prpafenone to 300 mg / TID. EKG with  - will monitor and repeat EKG in 3 months. He has lost 23 pounds since LOV and was congratulated. Diet and exercise encouraged to promote further weight loss. Continues to have edema to RLE. He is to have venous closure to saphenous vein of right leg.    7/20 patient complains of mild dizziness which is new and had orthostatic changes in PCPs office. It is likely secondary to volume depletion and weight loss. Will discontinue diuretics and follow home BP chart. Continue other medications but if the BP stays in the low normal range, will reduce other medications gradually as well.     9/20 tilt table test was negative. Blood pressure is very good and patient is losing some weight. Reduce lisinopril and follow the home chart. As patient says that he will lose more weight. Will adjust medications as needed in future. 2/21 atrial fibrillation is staying in sinus rhythm/sinus bradycardia. QTC and QRS duration are acceptable. Blood pressure is controlled. MR and AI stable. Follow clinically. Repeat echo to follow-up on aortic ectasia as well as MR and AI. Mediterranean diet guidelines printed. 8/27/2021 AFib clinically stable. 1/6/2022 A. fib has recurred. Likely because he is not using his CPAP regularly and has gained more weight which are significant factors. We will discontinue propafenone and [place] propafenone failure for now. Start amiodarone. Side effect profile discussed and need to monitor closely. Continue Eliquis. Will need to discontinue Lexapro due to amiodarone and possible QT interval increase. Diet weight discussed and Mediterranean diet guidelines given. MR and AI stable and mild. Follow clinically. Check a stress test to evaluate any ischemia due to recurrent A. fib and atypical chest pain. Diagnoses and all orders for this visit:    1. Typical atrial flutter (HCC)  -     AMB POC EKG ROUTINE W/ 12 LEADS, INTER & REP  -     HEPATIC FUNCTION PANEL; Future  -     TSH 3RD GENERATION; Future  -     PULMONARY FUNCTION TEST; Future  -     PFT DLCO; Future  -     HGB & HCT; Future  -     METABOLIC PANEL, BASIC; Future    2. MARIUSZ on CPAP  Comments:  CPAP not working correctly    3. Essential hypertension    4. Mitral and aortic regurgitation    5. Aortic ectasia, thoracic (HCC)    6. Severe obesity (BMI 35.0-39. 9) with comorbidity ([address]    7. Current use of long term anticoagulation        Pertinent laboratory and test data reviewed and discussed with patient.   See patient instructions also for other medical advice given    Medications Discontinued During This Encounter   Medication Reason    amiodarone (CORDARONE) 540 mg tablet DUPLICATE ORDER    levothyroxine (SYNTHROID) 387 mcg tablet DUPLICATE ORDER       Follow-up and Dispositions    · Return in about 6 weeks (around 4/15/2022), or if symptoms worsen or fail to improve, for with ekg, post procedure. 3/4/2022 continues to be in atrial flutter and taking amiodarone. Will plan cardioversion next week as discussed. Blood pressure is controlled. Stress test has not shown any ischemia. We will plan cardioversion as discussed with patient and wife. Neck step will be ablation if amiodarone fails. Labs as ordered. Discussed with patient and wife cardioversion procedure. Risks of IV sedation and DC shock explained which included, but not limited to, respiratory depression from IV sedation, failure of procedure and possibility of recurrence despite initial success. All questions were answered. Patient is willing to proceed.

## 2022-03-05 DIAGNOSIS — I48.0 PAROXYSMAL ATRIAL FIBRILLATION (HCC): ICD-10-CM

## 2022-03-05 RX ORDER — APIXABAN 5 MG/1
TABLET, FILM COATED ORAL
Qty: 60 TABLET | Refills: 0 | Status: SHIPPED | OUTPATIENT
Start: 2022-03-05 | End: 2022-04-15 | Stop reason: SDUPTHER

## 2022-03-07 ENCOUNTER — ANESTHESIA EVENT (OUTPATIENT)
Dept: CARDIAC CATH/INVASIVE PROCEDURES | Age: 67
End: 2022-03-07
Payer: MEDICARE

## 2022-03-07 NOTE — TELEPHONE ENCOUNTER
Spoke to patient regarding lab/test per Dr. Uriel Marquez. He voices understanding and acceptance of this advice and will call back if any further questions or concerns.

## 2022-03-08 ENCOUNTER — HOSPITAL ENCOUNTER (OUTPATIENT)
Age: 67
Setting detail: OUTPATIENT SURGERY
Discharge: HOME | End: 2022-03-08
Attending: INTERNAL MEDICINE | Admitting: INTERNAL MEDICINE
Payer: MEDICARE

## 2022-03-08 ENCOUNTER — ANESTHESIA (OUTPATIENT)
Dept: CARDIAC CATH/INVASIVE PROCEDURES | Age: 67
End: 2022-03-08
Payer: MEDICARE

## 2022-03-08 VITALS
SYSTOLIC BLOOD PRESSURE: 159 MMHG | TEMPERATURE: 98.6 F | HEIGHT: 74 IN | WEIGHT: 279 LBS | HEART RATE: 64 BPM | OXYGEN SATURATION: 98 % | BODY MASS INDEX: 35.81 KG/M2 | RESPIRATION RATE: 17 BRPM | DIASTOLIC BLOOD PRESSURE: 86 MMHG

## 2022-03-08 DIAGNOSIS — Z82.49 FAMILY HISTORY OF ATRIAL FLUTTER: ICD-10-CM

## 2022-03-08 LAB
ATRIAL RATE: 220 BPM
ATRIAL RATE: 61 BPM
CALCULATED P AXIS, ECG09: 28 DEGREES
CALCULATED R AXIS, ECG10: -26 DEGREES
CALCULATED R AXIS, ECG10: -32 DEGREES
CALCULATED T AXIS, ECG11: 30 DEGREES
CALCULATED T AXIS, ECG11: 33 DEGREES
DIAGNOSIS, 93000: NORMAL
DIAGNOSIS, 93000: NORMAL
P-R INTERVAL, ECG05: 252 MS
Q-T INTERVAL, ECG07: 434 MS
Q-T INTERVAL, ECG07: 462 MS
QRS DURATION, ECG06: 86 MS
QRS DURATION, ECG06: 98 MS
QTC CALCULATION (BEZET), ECG08: 465 MS
QTC CALCULATION (BEZET), ECG08: 468 MS
VENTRICULAR RATE, ECG03: 61 BPM
VENTRICULAR RATE, ECG03: 70 BPM

## 2022-03-08 PROCEDURE — 74011250636 HC RX REV CODE- 250/636: Performed by: ANESTHESIOLOGY

## 2022-03-08 PROCEDURE — 76060000031 HC ANESTHESIA FIRST 0.5 HR: Performed by: INTERNAL MEDICINE

## 2022-03-08 PROCEDURE — 00410 ANES INTEG SYS CONV ARRHYT: CPT | Performed by: ANESTHESIOLOGY

## 2022-03-08 PROCEDURE — 93005 ELECTROCARDIOGRAM TRACING: CPT

## 2022-03-08 PROCEDURE — 92960 CARDIOVERSION ELECTRIC EXT: CPT | Performed by: INTERNAL MEDICINE

## 2022-03-08 RX ORDER — SODIUM CHLORIDE, SODIUM LACTATE, POTASSIUM CHLORIDE, CALCIUM CHLORIDE 600; 310; 30; 20 MG/100ML; MG/100ML; MG/100ML; MG/100ML
25 INJECTION, SOLUTION INTRAVENOUS CONTINUOUS
Status: DISCONTINUED | OUTPATIENT
Start: 2022-03-08 | End: 2022-03-08 | Stop reason: HOSPADM

## 2022-03-08 RX ORDER — SODIUM CHLORIDE 0.9 % (FLUSH) 0.9 %
5-40 SYRINGE (ML) INJECTION AS NEEDED
Status: DISCONTINUED | OUTPATIENT
Start: 2022-03-08 | End: 2022-03-08 | Stop reason: HOSPADM

## 2022-03-08 RX ORDER — MIDAZOLAM HYDROCHLORIDE 1 MG/ML
INJECTION, SOLUTION INTRAMUSCULAR; INTRAVENOUS AS NEEDED
Status: DISCONTINUED | OUTPATIENT
Start: 2022-03-08 | End: 2022-03-08 | Stop reason: HOSPADM

## 2022-03-08 RX ORDER — FAMOTIDINE 20 MG/1
20 TABLET, FILM COATED ORAL ONCE
Status: DISCONTINUED | OUTPATIENT
Start: 2022-03-08 | End: 2022-03-08 | Stop reason: HOSPADM

## 2022-03-08 RX ORDER — SODIUM CHLORIDE 0.9 % (FLUSH) 0.9 %
5-40 SYRINGE (ML) INJECTION EVERY 8 HOURS
Status: DISCONTINUED | OUTPATIENT
Start: 2022-03-08 | End: 2022-03-08 | Stop reason: HOSPADM

## 2022-03-08 RX ORDER — PROPOFOL 10 MG/ML
INJECTION, EMULSION INTRAVENOUS AS NEEDED
Status: DISCONTINUED | OUTPATIENT
Start: 2022-03-08 | End: 2022-03-08 | Stop reason: HOSPADM

## 2022-03-08 RX ORDER — LIDOCAINE HYDROCHLORIDE 10 MG/ML
0.1 INJECTION, SOLUTION EPIDURAL; INFILTRATION; INTRACAUDAL; PERINEURAL AS NEEDED
Status: DISCONTINUED | OUTPATIENT
Start: 2022-03-08 | End: 2022-03-08 | Stop reason: HOSPADM

## 2022-03-08 RX ORDER — FENTANYL CITRATE 50 UG/ML
INJECTION, SOLUTION INTRAMUSCULAR; INTRAVENOUS AS NEEDED
Status: DISCONTINUED | OUTPATIENT
Start: 2022-03-08 | End: 2022-03-08 | Stop reason: HOSPADM

## 2022-03-08 RX ADMIN — FENTANYL CITRATE 100 MCG: 50 INJECTION, SOLUTION INTRAMUSCULAR; INTRAVENOUS at 11:06

## 2022-03-08 RX ADMIN — PROPOFOL 100 MG: 10 INJECTION, EMULSION INTRAVENOUS at 11:06

## 2022-03-08 RX ADMIN — MIDAZOLAM HYDROCHLORIDE 2 MG: 2 INJECTION, SOLUTION INTRAMUSCULAR; INTRAVENOUS at 11:06

## 2022-03-08 NOTE — PROGRESS NOTES
AVS Discharge instructions reviewed with patient and copy given to patient. All questions answered. Patient verbalized understanding to all discharge instructions. PIV removed. No hematoma or bleeding noted from  PIV site. No pain noted at discharge. Patient discharged with support person in stable condition. Escorted out to vehicle for transport home.

## 2022-03-08 NOTE — PROGRESS NOTES
Cath holding summary     Patient escorted to cath holding from waiting area ambulatory, alert and oriented x 4, voicing no complaints. Changed into gown and placed on monitor. NPO since MN. Lab results, med rec and H&P reviewed on chart. PIV x 1 inserted without difficulty. Family to bedside.

## 2022-03-08 NOTE — DISCHARGE INSTRUCTIONS
DISCHARGE SUMMARY from Nurse    PATIENT INSTRUCTIONS:    After general anesthesia or intravenous sedation, for 24 hours or while taking prescription Narcotics:  · Limit your activities  · Do not drive and operate hazardous machinery  · Do not make important personal or business decisions  · Do  not drink alcoholic beverages  · If you have not urinated within 8 hours after discharge, please contact your surgeon on call. Report the following to your surgeon:  · Excessive pain, swelling, redness or odor of or around the surgical area  · Temperature over 100.5  · Nausea and vomiting lasting longer than 4 hours or if unable to take medications  · Any signs of decreased circulation or nerve impairment to extremity: change in color, persistent  numbness, tingling, coldness or increase pain  · Any questions    What to do at Home:  Recommended activity: Activity as tolerated and no driving for today. If you experience any of the following symptoms new or worsening symptoms, please follow up with Dr. Lincoln Kirby or the ER. *  Please give a list of your current medications to your Primary Care Provider. *  Please update this list whenever your medications are discontinued, doses are      changed, or new medications (including over-the-counter products) are added. *  Please carry medication information at all times in case of emergency situations. These are general instructions for a healthy lifestyle:    No smoking/ No tobacco products/ Avoid exposure to second hand smoke  Surgeon General's Warning:  Quitting smoking now greatly reduces serious risk to your health.     Obesity, smoking, and sedentary lifestyle greatly increases your risk for illness    A healthy diet, regular physical exercise & weight monitoring are important for maintaining a healthy lifestyle    You may be retaining fluid if you have a history of heart failure or if you experience any of the following symptoms:  Weight gain of 3 pounds or more overnight or 5 pounds in a week, increased swelling in our hands or feet or shortness of breath while lying flat in bed. Please call your doctor as soon as you notice any of these symptoms; do not wait until your next office visit. The discharge information has been reviewed with the patient. The patient verbalized understanding. Discharge medications reviewed with the patient and appropriate educational materials and side effects teaching were provided. ___________________________________________________________________________________________________________________________________    Discharge Instructions for Cardioversion    Your healthcare provider performed a procedure called cardioversion. Your healthcare provider used a controlled electric shock or a medicine to briefly stop all electrical activity in your heart. This helped restore your hearts normal rhythm. Here are some instructions to follow while you recover. Home care   Because cardioversion typically requires sedation, you won't be able to drive home. You will need a ride. Wait at least 24 hours before driving a car or operating heavy machinery after receiving sedating medicines.  Dont be alarmed if the skin on your chest is irritated or feels like it is sunburned. Your healthcare provider may prescribe a soothing lotion to relieve this discomfort. These minor symptoms will go away in a few days.  Ask your healthcare provider about medicines to keep your heart rhythm steady.  If you were prescribed medicine, take it as instructed by your healthcare provider. Dont skip doses or take double doses. Cardioversion requires blood thinners for at least 4 weeks to prevent a delayed risk of stroke when treating atrial fibrillation or atrial flutter. Be sure you discuss which medicine you are taking to prevent stroke.  Ask when you need to have your medicine levels checked, and whether you may be able to stop taking it in the future or whether it is recommended that you take it for life. Some of these blood-thinning medicines will have the dose adjusted and interact with other medicines or foods. Your healthcare team will give you full instructions on what to watch out for. Report bleeding or symptoms of stroke immediately to your healthcare team and seek emergency medical attention.  Learn to take your own pulse. Keep a record of your results. Ask your healthcare provider when you should seek emergency medical attention. He or she will tell you which pulse rate reading is dangerous.  Keep in mind this procedure may need to be repeated if the abnormal heart rhythm returns. After the procedure, your healthcare provider will tell you if the treatment worked or if you will need further treatments or medication. Follow-up Care  Make a follow-up appointment, or as directed. Call 911  Call 911 right away if you have:     Chest pain     Shortness of breath     Loss of vision, speech, or strength or coordination in any body part    When to call your healthcare provider  Call your healthcare provider right away if you:     Feel faint, dizzy, or lightheaded     Have chest pain with increased activity     Have irregular heartbeat or fast pulse     Have bleeding issues from blood-thinning medicines.

## 2022-03-08 NOTE — ANESTHESIA POSTPROCEDURE EVALUATION
Procedure(s):  EP CARDIOVERSION. MAC    Anesthesia Post Evaluation      Multimodal analgesia: multimodal analgesia used between 6 hours prior to anesthesia start to PACU discharge  Patient location during evaluation: PACU  Patient participation: complete - patient participated  Level of consciousness: awake  Pain score: 0  Pain management: adequate  Airway patency: patent  Anesthetic complications: no  Cardiovascular status: acceptable  Respiratory status: acceptable  Hydration status: acceptable  Post anesthesia nausea and vomiting:  none  Final Post Anesthesia Temperature Assessment:  Normothermia (36.0-37.5 degrees C)      INITIAL Post-op Vital signs: No vitals data found for the desired time range.

## 2022-03-08 NOTE — ANESTHESIA PREPROCEDURE EVALUATION
Relevant Problems   RESPIRATORY SYSTEM   (+) MARIUSZ on CPAP      NEUROLOGY   (+) Depression, major, recurrent, mild (HCC)      CARDIOVASCULAR   (+) Essential hypertension   (+) Mitral and aortic regurgitation   (+) Paroxysmal atrial fibrillation (HCC)   (+) Typical atrial flutter (HCC)      ENDOCRINE   (+) Severe obesity (BMI 35.0-39. 9) with comorbidity ([address]       Anesthetic History     PONV          Review of Systems / Medical History  Pertinent labs reviewed    Pulmonary        Sleep apnea: No treatment           Neuro/Psych         Psychiatric history     Cardiovascular    Hypertension  Valvular problems/murmurs: mitral insufficiency and aortic insufficiency      Dysrhythmias : atrial fibrillation      Exercise tolerance: <4 METS  Comments: EF 60%   GI/Hepatic/Renal  Within defined limits              Endo/Other      Hypothyroidism: well controlled  Morbid obesity     Other Findings              Physical Exam    Airway  Mallampati: II  TM Distance: > 6 cm  Neck ROM: normal range of motion   Mouth opening: Normal     Cardiovascular    Rhythm: irregular      Murmur: Grade 1, Aortic area and Mitral area     Dental    Dentition: Poor dentition     Pulmonary  Breath sounds clear to auscultation               Abdominal  GI exam deferred       Other Findings            Anesthetic Plan    ASA: 4  Anesthesia type: MAC          Induction: Intravenous  Anesthetic plan and risks discussed with: Patient

## 2022-03-11 ENCOUNTER — HOSPITAL ENCOUNTER (OUTPATIENT)
Dept: RESPIRATORY THERAPY | Age: 67
Discharge: HOME | End: 2022-03-11
Attending: INTERNAL MEDICINE
Payer: MEDICARE

## 2022-03-11 DIAGNOSIS — I48.3 TYPICAL ATRIAL FLUTTER (HCC): ICD-10-CM

## 2022-03-11 PROCEDURE — 94729 DIFFUSING CAPACITY: CPT

## 2022-03-11 PROCEDURE — 94010 BREATHING CAPACITY TEST: CPT

## 2022-03-11 PROCEDURE — 94726 PLETHYSMOGRAPHY LUNG VOLUMES: CPT

## 2022-03-14 ENCOUNTER — TELEPHONE (OUTPATIENT)
Dept: CARDIOLOGY CLINIC | Age: 67
End: 2022-03-14

## 2022-03-14 NOTE — TELEPHONE ENCOUNTER
Patient called and states Dr. Lincoln Kirby did CV on Tuesday and on Sunday his bp was elevated and his monitor showed afib. He call spoke with Dr. Tameka Mederos on call and he said he would call Dr. Lincoln Kirby. Patient states today no afib but his BP was 148/74  Hr 52 this am and at 11 am it was BP was 161/84 HR 50. Patient states resting 10-15 min before checking BP.  Please advise

## 2022-03-14 NOTE — TELEPHONE ENCOUNTER
Spoke with patient per Israel Mathew NP. He may have had brief break thru episode of afib. Continue current medications. IF BP remains elevated this afternoon (systolic >626) may take additional 1/2 tab of lisinopril--5 mg. Recommend chart BP/hr x1 week. He voices understanding and acceptance of this advice and will call back if any further questions or concerns.

## 2022-03-14 NOTE — TELEPHONE ENCOUNTER
He may have had brief break thru episode of afib. Continue current medications. If b/p remains elevated this afternoon (systolic > 621) may take additional 1/2 tab of lisinopril -- 5mg/  Recommend home b/p , hr chart x 1 week.

## 2022-03-18 ENCOUNTER — TELEPHONE (OUTPATIENT)
Dept: CARDIOLOGY CLINIC | Age: 67
End: 2022-03-18

## 2022-03-18 PROBLEM — I95.1 ORTHOSTASIS: Status: ACTIVE | Noted: 2020-07-17

## 2022-03-18 NOTE — TELEPHONE ENCOUNTER
[facility] Nurse Psychiatrist Dr. Margie Serna called. Wanted to know if pt can get put back on Lisinopril.        Please Advise: Dr. Margie Serna @ [phone]

## 2022-03-19 PROBLEM — I48.3 TYPICAL ATRIAL FLUTTER (HCC): Status: ACTIVE | Noted: 2022-03-04

## 2022-03-19 PROBLEM — Z45.42 BATTERY END OF LIFE OF SPINAL CORD STIMULATOR: Status: ACTIVE | Noted: 2019-10-22

## 2022-03-19 PROBLEM — E66.9 OBESITY (BMI 30.0-34.9): Status: ACTIVE | Noted: 2020-07-17

## 2022-03-19 PROBLEM — E66.811 OBESITY (BMI 30.0-34.9): Status: ACTIVE | Noted: 2020-07-17

## 2022-03-19 PROBLEM — G47.33 OSA ON CPAP: Status: ACTIVE | Noted: 2021-08-27

## 2022-03-19 PROBLEM — I77.810 AORTIC ECTASIA, THORACIC (HCC): Status: ACTIVE | Noted: 2020-01-27

## 2022-03-19 PROBLEM — Z79.01 CURRENT USE OF LONG TERM ANTICOAGULATION: Status: ACTIVE | Noted: 2021-08-27

## 2022-03-19 PROBLEM — T84.498A FAILED ORTHOPEDIC IMPLANT (HCC): Status: ACTIVE | Noted: 2019-10-10

## 2022-03-19 PROBLEM — Z99.89 OSA ON CPAP: Status: ACTIVE | Noted: 2021-08-27

## 2022-03-19 PROBLEM — I08.0 MITRAL AND AORTIC REGURGITATION: Status: ACTIVE | Noted: 2019-11-04

## 2022-03-19 PROBLEM — I10 ESSENTIAL HYPERTENSION: Status: ACTIVE | Noted: 2019-11-04

## 2022-03-19 PROBLEM — R42 DIZZINESS: Status: ACTIVE | Noted: 2020-08-04

## 2022-03-19 PROBLEM — I48.0 PAROXYSMAL ATRIAL FIBRILLATION (HCC): Status: ACTIVE | Noted: 2019-11-04

## 2022-03-20 PROBLEM — R07.9 CHEST PAIN: Status: ACTIVE | Noted: 2019-11-04

## 2022-03-20 PROBLEM — F33.0 DEPRESSION, MAJOR, RECURRENT, MILD (HCC): Status: ACTIVE | Noted: 2019-10-22

## 2022-03-20 PROBLEM — E66.01 SEVERE OBESITY (BMI 35.0-39.9) WITH COMORBIDITY (HCC): Status: ACTIVE | Noted: 2019-01-28

## 2022-03-22 NOTE — TELEPHONE ENCOUNTER
Dr. Cris Mcmahan patient called and wanted to know if patient can be put back on Lexapro. He states you took patient off it. Please advise or call him once you return. [phone] or call office at [phone].

## 2022-03-23 NOTE — TELEPHONE ENCOUNTER
Left message on Dr. Katya Carter  regarding patient. Lexapro will have a side effect of increasing QT interval when used with amiodarone. This may be a life threatening side effect. It would be best to use some antidepressant which does not cause QT prolongation. If any questions to call office.

## 2022-03-23 NOTE — TELEPHONE ENCOUNTER
Lexapro will have a side effect of increasing QT interval when used with amiodarone. This may be a life-threatening side effect. It would be best to use some antidepressant which does not cause QT prolongation.

## 2022-03-31 DIAGNOSIS — I10 ESSENTIAL HYPERTENSION: ICD-10-CM

## 2022-03-31 RX ORDER — LISINOPRIL 20 MG/1
20 TABLET ORAL DAILY
Qty: 30 TABLET | Refills: 1 | Status: SHIPPED | OUTPATIENT
Start: 2022-03-31 | End: 2022-04-15

## 2022-03-31 NOTE — TELEPHONE ENCOUNTER
Dr Luis A Lezama: reviewed pt BP log and states; Increase Lisinopril to 20 mg y day  BP chart X 4-5 days    Called pt and states they will increase the lisinopril to 20 mg and keep BP chart X 4-5 days. Patient was able to was clear with direction and send in a new prescription to pharmacy.

## 2022-04-15 ENCOUNTER — OFFICE VISIT (OUTPATIENT)
Dept: CARDIOLOGY CLINIC | Age: 67
End: 2022-04-15
Payer: MEDICARE

## 2022-04-15 VITALS
SYSTOLIC BLOOD PRESSURE: 143 MMHG | DIASTOLIC BLOOD PRESSURE: 82 MMHG | HEIGHT: 74 IN | BODY MASS INDEX: 35.04 KG/M2 | WEIGHT: 273 LBS | HEART RATE: 53 BPM

## 2022-04-15 DIAGNOSIS — I48.0 PAROXYSMAL ATRIAL FIBRILLATION (HCC): Primary | ICD-10-CM

## 2022-04-15 DIAGNOSIS — Z79.899 AT RISK FOR AMIODARONE TOXICITY WITH LONG TERM USE: ICD-10-CM

## 2022-04-15 DIAGNOSIS — E66.01 SEVERE OBESITY (BMI 35.0-39.9) WITH COMORBIDITY (HCC): ICD-10-CM

## 2022-04-15 DIAGNOSIS — I77.810 AORTIC ECTASIA, THORACIC (HCC): ICD-10-CM

## 2022-04-15 DIAGNOSIS — I10 ESSENTIAL HYPERTENSION: ICD-10-CM

## 2022-04-15 DIAGNOSIS — I08.0 MITRAL AND AORTIC REGURGITATION: ICD-10-CM

## 2022-04-15 DIAGNOSIS — Z91.89 AT RISK FOR AMIODARONE TOXICITY WITH LONG TERM USE: ICD-10-CM

## 2022-04-15 DIAGNOSIS — Z99.89 OSA ON CPAP: ICD-10-CM

## 2022-04-15 DIAGNOSIS — G47.33 OSA ON CPAP: ICD-10-CM

## 2022-04-15 PROCEDURE — G8753 SYS BP > OR = 140: HCPCS | Performed by: INTERNAL MEDICINE

## 2022-04-15 PROCEDURE — 99214 OFFICE O/P EST MOD 30 MIN: CPT | Performed by: INTERNAL MEDICINE

## 2022-04-15 PROCEDURE — 93000 ELECTROCARDIOGRAM COMPLETE: CPT | Performed by: INTERNAL MEDICINE

## 2022-04-15 PROCEDURE — 3017F COLORECTAL CA SCREEN DOC REV: CPT | Performed by: INTERNAL MEDICINE

## 2022-04-15 PROCEDURE — G8427 DOCREV CUR MEDS BY ELIG CLIN: HCPCS | Performed by: INTERNAL MEDICINE

## 2022-04-15 PROCEDURE — G9717 DOC PT DX DEP/BP F/U NT REQ: HCPCS | Performed by: INTERNAL MEDICINE

## 2022-04-15 PROCEDURE — 1101F PT FALLS ASSESS-DOCD LE1/YR: CPT | Performed by: INTERNAL MEDICINE

## 2022-04-15 PROCEDURE — G8536 NO DOC ELDER MAL SCRN: HCPCS | Performed by: INTERNAL MEDICINE

## 2022-04-15 PROCEDURE — G8754 DIAS BP LESS 90: HCPCS | Performed by: INTERNAL MEDICINE

## 2022-04-15 PROCEDURE — G8417 CALC BMI ABV UP PARAM F/U: HCPCS | Performed by: INTERNAL MEDICINE

## 2022-04-15 RX ORDER — LISINOPRIL 40 MG/1
40 TABLET ORAL DAILY
Qty: 90 TABLET | Refills: 1 | Status: SHIPPED | OUTPATIENT
Start: 2022-04-15

## 2022-04-15 RX ORDER — AMIODARONE HYDROCHLORIDE 200 MG/1
200 TABLET ORAL DAILY
Qty: 90 TABLET | Refills: 3 | Status: SHIPPED | OUTPATIENT
Start: 2022-04-15

## 2022-04-15 NOTE — PROGRESS NOTES
HISTORY OF PRESENT ILLNESS  Bertin Story is a 74 y.o. male. Follow-up of paroxysmal atrial fibrillation, hypertension, MR, AI,    1/22 noted irregular heartbeat and BP monitor for the last 2 to 3 weeks. Feeling tired for last 2 to 3 weeks in addition to chest symptoms as below. 9/19 new patient just moved from [place]. Seen for preop clearance. Has chronic back problems and peripheral neuropathy. History of paroxysmal A. Fib. Last episode 2013 approximately per patient. 4/22 feeling significantly better since cardioversion. All cardiac symptoms resolved. Has good functional capacity as he can go up 2-3 flights of steps and can walk half to 1 mile on a level ground without stopping.  2/2020 He c/o palpitations with associated SOB and chest pain with flushed sensation. Episodes last about 20 minutes and resolve. He is anticipating right venous surgery in March.  6/2020 Denies palpitations, SOB or chest pain. Follow-up  Pertinent negatives include no chest pain, no headaches and no shortness of breath. Dizziness  The history is provided by the patient. This is a recurrent problem. The current episode started more than 1 week ago (7/20). The problem occurs rarely (<1/wk). The problem has been resolved. Pertinent negatives include no chest pain, no headaches and no shortness of breath. The symptoms are aggravated by standing. Palpitations   The history is provided by the patient (PAF). This is a recurrent problem. Episode onset: 5/19. Progression since onset: Since Rythmol started. The problem occurs rarely (2-3/month). On average, each episode lasts 5 minutes. The problem is associated with nothing. Associated symptoms include lower extremity edema. Pertinent negatives include no diaphoresis, no fever, no malaise/fatigue, no chest pain, no claudication, no orthopnea, no PND, no nausea, no vomiting, no headaches, no dizziness, no cough and no shortness of breath.  His past medical history is significant for hypertension. Leg Swelling  The history is provided by the patient. This is a new problem. The current episode started more than 1 week ago (3/19). The problem occurs constantly. The problem has been rapidly improving. Pertinent negatives include no chest pain, no headaches and no shortness of breath. Nothing aggravates the symptoms. Chest Pain (Angina)   The history is provided by the patient. This is a new problem. The current episode started more than 1 week ago (3 weeks ago). The problem has been resolved. The problem occurs constantly. The quality of the pain is described as pressure-like. The pain does not radiate. Associated symptoms include lower extremity edema and palpitations (PAF). Pertinent negatives include no claudication, no cough, no diaphoresis, no dizziness, no fever, no headaches, no malaise/fatigue, no nausea, no orthopnea, no PND, no shortness of breath and no vomiting. Review of Systems   Constitutional: Negative for chills, diaphoresis, fever, malaise/fatigue and weight loss. HENT: Negative for nosebleeds. Eyes: Negative for discharge. Respiratory: Negative for cough, shortness of breath and wheezing. Cardiovascular: Positive for palpitations (PAF) and leg swelling. Negative for chest pain, orthopnea, claudication and PND. Gastrointestinal: Negative for diarrhea, nausea and vomiting. Genitourinary: Negative for dysuria and hematuria. Musculoskeletal: Negative for joint pain. Skin: Negative for rash. Neurological: Negative for dizziness, seizures, loss of consciousness and headaches. Endo/Heme/Allergies: Negative for polydipsia. Does not bruise/bleed easily. Psychiatric/Behavioral: Negative for depression and substance abuse. The patient does not have insomnia.       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    [facility]     Depression     Endocrine disease     Hypothyroidism    Fractures ,     ankle left, left wrist    Headache     Hearing loss     Hypertension     Ill-defined condition     Pain stimulator in back    Ill-defined condition     irregular heartbeat    Nausea & vomiting     Psychiatric disorder     depression    Sleep apnea     on cpap    Thyroid disease        Family History   Problem Relation Age of Onset    OSTEOARTHRITIS Mother     Bleeding Prob Mother     Cancer Mother     Headache Mother     Hypertension Mother     Migraines Mother     Stroke Mother 68    OSTEOARTHRITIS Father     Stroke Father 80    OSTEOARTHRITIS Sister     Cancer Sister     Migraines Sister     OSTEOARTHRITIS Brother     Cancer Brother     Headache Brother     Migraines Brother     Stroke Paternal Uncle     Heart Surgery Paternal Uncle 72    Diabetes Other        Social History     Tobacco Use    Smoking status: Former Smoker     Quit date:      Years since quittin.3    Smokeless tobacco: Never Used   Vaping Use    Vaping Use: Never used   Substance Use Topics    Alcohol use: No    Drug use: No        Current Outpatient Medications   Medication Sig    lisinopriL (PRINIVIL, ZESTRIL) 20 mg tablet Take 1 Tablet by mouth daily.  Eliquis 5 mg tablet Take 1 tablet by mouth twice daily    levothyroxine (Euthyrox) 100 mcg tablet Euthyrox 100 mcg tablet   Take 1 tablet by mouth once daily    polyethylene glycol (Miralax) 17 gram/dose powder 17 g    amiodarone (CORDARONE) 200 mg tablet Take 1 Tablet by mouth daily.     oxyCODONE-acetaminophen (PERCOCET 7.5) 7.5-325 mg per tablet oxycodone-acetaminophen 7.5 mg-325 mg tablet   TAKE 1 TABLET BY MOUTH EVERY 8 HOURS FOR CHRONIC PAIN    gabapentin (NEURONTIN) 300 mg capsule Take 300 mg by mouth three (3) times daily.  diclofenac (VOLTAREN) 1 % gel Apply 2 g to affected area three (3) times daily as needed for Pain.  naloxone (NARCAN) 4 mg/actuation nasal spray Use 1 spray intranasally, then discard. Repeat with new spray every 2 min as needed for opioid overdose symptoms, alternating nostrils.  busPIRone (BUSPAR) 10 mg tablet TAKE 1 TABLET BY MOUTH THREE TIMES DAILY (Patient taking differently: Take 10 mg by mouth four (4) times daily as needed (anxiety). )    buPROPion SR (WELLBUTRIN SR) 150 mg SR tablet TAKE 1 TABLET BY MOUTH ONCE DAILY    testosterone cypionate (DEPOTESTOTERONE CYPIONATE) 200 mg/mL injection 0.5 mL by IntraMUSCular route every seven (7) days. Max Daily Amount: 100 mg. (Patient taking differently: 100 mg by IntraMUSCular route every fourteen (14) days.)    cholecalciferol (VITAMIN D3) 1,000 unit tablet Take  by mouth daily. No current facility-administered medications for this visit. Past Surgical History:   Procedure Laterality Date    HX BACK SURGERY  10/10/2019    Revision SCS    HX COLONOSCOPY      HX HEENT  2003    Reconstructive surgery on face    HX KNEE REPLACEMENT  2015/2016    bilat knees    HX ORTHOPAEDIC  [id number]    back surgery X4       Visit Vitals  BP (!) 143/82 (BP 1 Location: Left upper arm, BP Patient Position: Sitting, BP Cuff Size: Adult)   Pulse (!) 53   Ht 6' 2\" (1.88 m)   Wt 123.8 kg (273 lb)   BMI 35.05 kg/m²       Diagnostic Studies:  I have reviewed the relevant tests done on the patient and show as follows  EKG tracings reviewed by me today. EKG Results     None      1/2006 cardiac catheterization  CONCLUSION:      1. Normal left ventricular cavity size, wall motion ejection fraction.       2. Coronary artery irregularities without significant coronary atrial         stenoses. XR Results (most recent):  Results from Hospital Encounter encounter on 09/29/20    XR SPINE LUMB COMP W BEND    Narrative  Lumbar spine, 7 views including flexion and extension. INDICATION:Radiculopathy, lumbar region. COMPARISON: CT 5/29/2019    FINDINGS:    Status post posterior fusion at L2-L3, L3-L4, and L4-L5. No lucency surrounding  the screws. Flexion-extension views demonstrate degenerative retrolisthesis at  L2-L3. Evaluation of L5-S1 is limited by positioning, but there is at least 10  mm grade 1 anterolisthesis which measures approximately 12 mm with flexion. Impression  IMPRESSION:    1.  Stable postsurgical changes. No acute hardware abnormality. 2.  There is apparent increase in anterolisthesis at L5-S1 although positioning  is suboptimal. Dynamic measurements as above. Cross-sectional imaging may be of  benefit for confirmation. 11/04/19   ECHO ADULT COMPLETE 11/04/2019 11/4/2019    Narrative · Left Ventricle: Normal cavity size and systolic function (ejection   fraction normal). Mildly to moderately increased wall thickness. Estimated   left ventricular ejection fraction is 56 - 60%. Moderate (grade 2) left   ventricular diastolic dysfunction. · Right Ventricle: Normal right ventricular size and function. · Right Atrium: Mildly dilated right atrium. · Mitral Valve: Mitral valve thickening. Mild mitral valve regurgitation   is present. · Aortic Valve: Mild aortic valve regurgitation is present. · Tricuspid Valve: Mild tricuspid valve regurgitation is present. · Pulmonary Artery: There is no evidence of pulmonary hypertension. · Aorta: Mildly dilated aortic root; diameter is 3.9 cm. Mildly dilated   ascending aorta; diameter is 4 cm. Signed by: Rachel Abrams MD     12/04/19   NUCLEAR CARDIAC STRESS TEST 12/04/2019 12/4/2019    Narrative · Baseline ECG: Normal EKG, myocardial infarction.  The infarction is   located in the inferior regions. .  · Negative stress test.  · Gated SPECT: Left ventricular function post-stress was normal.   Calculated ejection fraction is 64%. There is no evidence of transient   ischemic dilation (TID). The TID ratio is 0.88. · Left ventricular perfusion is normal.  · Negative myocardial perfusion imaging. Myocardial perfusion imaging   supports a low risk stress test.        Signed by: Juan Carlos Houston MD   10/19 cardiac event monitor  Paroxysmal atrial flutter. 7/20 carotid duplex  Interpretation Summary  No significant obstructive lesions noted in the right extracranial carotid  system. < 50% left internal carotid artery stenosis. Antegrade flow noted in the vertebral arteries. Normal flow noted in the subclavian arteries. 8/20 Tilt Table  CONCLUSION: Negative tilt table test including sublingual nitroglycerin  after a fluid bolus. 8/21 echo  Interpretation Summary    · LV: Estimated LVEF is 60 - 65%. Normal cavity size and systolic function (ejection fraction normal). Mild concentric hypertrophy. Wall motion: normal. Moderate (grade 2) left ventricular diastolic dysfunction. · LA: Left Atrium volume index is 27.92 mL/m2. · RA: Mildly dilated right atrium. · AV: Mild aortic valve regurgitation is present. · MV: Mild mitral valve regurgitation is present. · TV: Mild tricuspid valve regurgitation is present. Right Ventricular Arterial Pressure (RVSP) is 36 mmHg. Pulmonary hypertension not suggested by Doppler findings. · PV: Mild pulmonic valve regurgitation is present. · AO: Aortic root is 4.2 cm, ascending aorta 4.2 cm.       Comparison Study Information    Prior Study    When compared to the previous study from 11/4/19, aortic root diameter has increased from 3.9 cm and the ascending aorta diameter has increased from 4.0 cm.   01/13/22    NUCLEAR CARDIAC STRESS TEST 01/13/2022 1/14/2022    Interpretation Summary    Nuclear Findings: Normal left ventricular systolic function post-stress. LVEF measures 77%.   Nuclear Findings: Normal pharmacological myocardial perfusion study. LVEF measures 77%.   Nuclear Findings: LVEF measures 77%. LV perfusion is normal.    ECG: Resting ECG demonstrates atrial flutter.   ECG: Stress ECG was negative for ischemia.   Stress Test: A pharmacological stress test was performed using regadenoson. Hemodynamics are adequate for diagnosis. Blood pressure demonstrated a normal response and heart rate demonstrated a normal response to stress. Signed by: Estrada Small MD on 1/13/2022 12:08 PM    Mr. Julien Campos has a reminder for a \"due or due soon\" health maintenance. I have asked that he contact his primary care provider for follow-up on this health maintenance. Physical Exam  Constitutional:       General: He is not in acute distress. Appearance: He is well-developed. He is obese. HENT:      Head: Normocephalic and atraumatic. Mouth/Throat:      Dentition: Normal dentition. Eyes:      General: No scleral icterus. Right eye: No discharge. Left eye: No discharge. Neck:      Thyroid: No thyromegaly. Vascular: No carotid bruit or JVD. Cardiovascular:      Rate and Rhythm: Normal rate and regular rhythm. Pulses: Intact distal pulses. Heart sounds: Normal heart sounds, S1 normal and S2 normal. No murmur heard. No friction rub. No gallop. Pulmonary:      Effort: Pulmonary effort is normal.      Breath sounds: Normal breath sounds. No wheezing or rales. Abdominal:      Palpations: Abdomen is soft. There is no mass. Tenderness: There is no abdominal tenderness. Musculoskeletal:      Cervical back: Neck supple. Right lower leg: No edema. Left lower leg: No edema. Lymphadenopathy:      Cervical:      Right cervical: No superficial cervical adenopathy. Left cervical: No superficial cervical adenopathy. Skin:     General: Skin is warm and dry. Findings: No rash.    Neurological: Mental Status: He is alert and oriented to person, place, and time. Psychiatric:         Behavior: Behavior normal.         ASSESSMENT and PLAN    History of paroxysmal A. Fib. Last episode 2013 approximately per patient. Mitral and aortic regurgitation: Mild 10/19; 8/21; Aortic ectasia: 11/19 aortic root 3.9, ASC ao 4.0; 8/21 ao root 4.2, ASC ao 4.2;  Cardioversion: 3/22 successful  History of amiodarone: Given for A. Fib    TSH: Normal 3/22;   LFTs: Normal 3/22;   DLCO: 3/22 95%; Results for Franca Ritter. Danya Tomlinson (MRN [id number]) as of 9/13/2019 11:15   Ref. Range 2/1/2019 08:49   Triglyceride Latest Ref Range: <150 MG/DL 63   Cholesterol, total Latest Ref Range: <200 MG/   HDL Cholesterol Latest Ref Range: 40 - 60 MG/DL 46   CHOL/HDL Ratio Latest Ref Range: 0 - 5.0   3.0   LDL, calculated Latest Ref Range: 0 - 100 MG/DL 77.4   VLDL, calculated Latest Units: MG/DL 12.6       2/2020  Continues to c/o palpitations with SOB, dizziness and flushed sensation. These occur daily and last 5-20 minutes. Will increase propafenone to 300 mg/ TID. Check EKG in 1 week. If palpitations continue, will evaluate for arrhythmias with Holter monitor. To resume lasix and compression stockings for edema. F/U with vascular as scheduled. Advised to wear CPAP consistently, this may improve SOB and palpitations. EKG - SR mild increase in QRS. Repeat EKG in 1 week. 6/2020  Reports palpitations have resolved with increased dose of prpafenone to 300 mg / TID. EKG with  - will monitor and repeat EKG in 3 months. He has lost 23 pounds since LOV and was congratulated. Diet and exercise encouraged to promote further weight loss. Continues to have edema to RLE. He is to have venous closure to saphenous vein of right leg.    7/20 patient complains of mild dizziness which is new and had orthostatic changes in PCPs office. It is likely secondary to volume depletion and weight loss.   Will discontinue diuretics and follow home BP chart. Continue other medications but if the BP stays in the low normal range, will reduce other medications gradually as well. 9/20 tilt table test was negative. Blood pressure is very good and patient is losing some weight. Reduce lisinopril and follow the home chart. As patient says that he will lose more weight. Will adjust medications as needed in future. 2/21 atrial fibrillation is staying in sinus rhythm/sinus bradycardia. QTC and QRS duration are acceptable. Blood pressure is controlled. MR and AI stable. Follow clinically. Repeat echo to follow-up on aortic ectasia as well as MR and AI. Mediterranean diet guidelines printed. 8/27/2021 AFib clinically stable. 1/6/2022 A. fib has recurred. Likely because he is not using his CPAP regularly and has gained more weight which are significant factors. We will discontinue propafenone and [place] propafenone failure for now. Start amiodarone. Side effect profile discussed and need to monitor closely. Continue Eliquis. Will need to discontinue Lexapro due to amiodarone and possible QT interval increase. Diet weight discussed and Mediterranean diet guidelines given. MR and AI stable and mild. Follow clinically. Check a stress test to evaluate any ischemia due to recurrent A. fib and atypical chest pain. 3/4/2022 continues to be in atrial flutter and taking amiodarone. Will plan cardioversion next week as discussed. Blood pressure is controlled. Stress test has not shown any ischemia. We will plan cardioversion as discussed with patient and wife. Neck step will be ablation if amiodarone fails. Labs as ordered. Discussed with patient and wife cardioversion procedure. Risks of IV sedation and DC shock explained which included, but not limited to, respiratory depression from IV sedation, failure of procedure and possibility of recurrence despite initial success. All questions were answered.  Patient is willing to proceed.

## 2022-04-15 NOTE — PATIENT INSTRUCTIONS
Medications Discontinued During This Encounter   Medication Reason    cyanocobalamin 1,000 mcg tablet Therapy Completed    amiodarone (CORDARONE) 200 mg tablet REORDER    Eliquis 5 mg tablet REORDER    lisinopriL (PRINIVIL, ZESTRIL) 20 mg tablet     diclofenac (VOLTAREN) 1 % gel ERROR     After the recommended changes have been made in blood pressure medicines, patient advised to keep BP/HR(pulse rate) chart twice daily and bring us results in next 4 to 5 days. Patient may send the results via \"My Chart\" if desired. Please rest for 5-10 minutes before checking blood pressure. Sit on a comfortable chair without crossing the legs and put your arm on a table. We recommend that you use an upper arm cuff. Check the blood pressure 3 times each time you check the blood pressure and record the lowest reading. If you check the blood pressure in both arms, use the higher reading. Learning About the [address] [facility] Diet  What is the Mediterranean diet? The Mediterranean diet is a style of eating rather than a diet plan. It features foods eaten in [place], [place], [place], and other countries along the [facility]. It emphasizes eating foods like fish, fruits, vegetables, beans, high-fiber breads and whole grains, nuts, and olive oil. This style of eating includes limited red meat, cheese, and sweets. Why choose the Mediterranean diet? A Mediterranean-style diet may improve heart health. It contains more fat than other heart-healthy diets. But the fats are mainly from nuts, unsaturated oils (such as fish oils and olive oil), and certain nut or seed oils (such as canola, soybean, or flaxseed oil). These fats may help protect the heart and blood vessels. How can you get started on the Mediterranean diet? Here are some things you can do to switch to a more Mediterranean way of eating.   What to eat  · Eat a variety of fruits and vegetables each day, such as grapes, blueberries, tomatoes, broccoli, peppers, figs, olives, spinach, eggplant, beans, lentils, and chickpeas. · Eat a variety of whole-grain foods each day, such as oats, brown rice, and whole wheat bread, pasta, and couscous. · Eat fish at least 2 times a week. Try tuna, salmon, mackerel, lake trout, herring, or sardines. · Eat moderate amounts of low-fat dairy products, such as milk, cheese, or yogurt. · Eat moderate amounts of poultry and eggs. · Choose healthy (unsaturated) fats, such as nuts, olive oil, and certain nut or seed oils like canola, soybean, and flaxseed. · Limit unhealthy (saturated) fats, such as butter, palm oil, and coconut oil. And limit fats found in animal products, such as meat and dairy products made with whole milk. Try to eat red meat only a few times a month in very small amounts. · Limit sweets and desserts to only a few times a week. This includes sugar-sweetened drinks like soda. The Mediterranean diet may also include red wine with your meal--1 glass each day for women and up to 2 glasses a day for men. Tips for eating at home  · Use herbs, spices, garlic, lemon zest, and citrus juice instead of salt to add flavor to foods. · Add avocado slices to your sandwich instead of fuller. · Have fish for lunch or dinner instead of red meat. Brush the fish with olive oil, and broil or grill it. · Sprinkle your salad with seeds or nuts instead of cheese. · Cook with olive or canola oil instead of butter or oils that are high in saturated fat. · Switch from 2% milk or whole milk to 1% or fat-free milk. · Dip raw vegetables in a vinaigrette dressing or hummus instead of dips made from mayonnaise or sour cream.  · Have a piece of fruit for dessert instead of a piece of cake. Try baked apples, or have some dried fruit. Tips for eating out  · Try broiled, grilled, baked, or poached fish instead of having it fried or breaded.   · Ask your  to have your meals prepared with olive oil instead of butter. · Order dishes made with marinara sauce or sauces made from olive oil. Avoid sauces made from cream or mayonnaise. · Choose whole-grain breads, whole wheat pasta and pizza crust, brown rice, beans, and lentils. · Cut back on butter or margarine on bread. Instead, you can dip your bread in a small amount of olive oil. · Ask for a side salad or grilled vegetables instead of french fries or chips. Where can you learn more? Go to http://www.tony.com/  Enter O407 in the search box to learn more about \"Learning About the Mediterranean Diet. \"  Current as of: September 8, 2021               Content Version: 13.2  © 2006-2022 [employer]. Care instructions adapted under license by [employer] (which disclaims liability or warranty for this information). If you have questions about a medical condition or this instruction, always ask your healthcare professional. [place] [address] any warranty or liability for your use of this information.

## 2022-04-15 NOTE — PROGRESS NOTES
1. Have you been to the ER, urgent care clinic since your last visit? Hospitalized since your last visit? Urgent care for heel pain    2. Have you seen or consulted any other health care providers outside of the [address] [place] [address] since your last visit? Include any pap smears or colon screening. No     3. Since your last visit, have you had any of the following symptoms?      swelling in legs/arms. 4.  Have you had any blood work, X-rays or cardiac testing? Yes Where: PCP     Requested: YES     In [facility]: YES    5. Where do you normally have your labs drawn? PCP    6. Do you need any refills today?    Yes

## 2022-05-20 DIAGNOSIS — I10 ESSENTIAL HYPERTENSION: Primary | ICD-10-CM

## 2022-05-20 RX ORDER — AMLODIPINE BESYLATE 5 MG/1
5 TABLET ORAL DAILY
Qty: 30 TABLET | Refills: 3 | Status: SHIPPED | OUTPATIENT
Start: 2022-05-20

## 2022-05-20 NOTE — TELEPHONE ENCOUNTER
Requested Prescriptions     Pending Prescriptions Disp Refills    amLODIPine (NORVASC) 5 mg tablet 30 Tablet 3     Sig: Take 1 Tablet by mouth daily.

## 2022-05-20 NOTE — TELEPHONE ENCOUNTER
Paper message per Sari Ybarra NP regarding BP log. Please confirm patient is taking Lisinopril 40 mg a day, BP remains elevated. IF taking 40 mg- Add Amlodipine 5 mg a day. Home BP chart X 1 week. Spoke with patient wife and she states patient is taking lisinopril 40 mg and. She  voices understanding and acceptance of this advice and will call back if any further questions or concerns.

## 2022-11-17 DIAGNOSIS — I10 ESSENTIAL HYPERTENSION: ICD-10-CM

## 2022-11-17 RX ORDER — AMLODIPINE BESYLATE 5 MG/1
TABLET ORAL
Qty: 30 TABLET | Refills: 0 | Status: SHIPPED | OUTPATIENT
Start: 2022-11-17

## 2022-11-17 RX ORDER — LISINOPRIL 40 MG/1
TABLET ORAL
Qty: 90 TABLET | Refills: 0 | Status: SHIPPED | OUTPATIENT
Start: 2022-11-17

## 2023-01-31 ENCOUNTER — OFFICE VISIT (OUTPATIENT)
Dept: CARDIOLOGY CLINIC | Age: 68
End: 2023-01-31
Payer: MEDICARE

## 2023-01-31 VITALS
BODY MASS INDEX: 36.32 KG/M2 | DIASTOLIC BLOOD PRESSURE: 67 MMHG | WEIGHT: 283 LBS | HEIGHT: 74 IN | HEART RATE: 52 BPM | OXYGEN SATURATION: 98 % | SYSTOLIC BLOOD PRESSURE: 122 MMHG

## 2023-01-31 DIAGNOSIS — I77.810 AORTIC ECTASIA, THORACIC (HCC): ICD-10-CM

## 2023-01-31 DIAGNOSIS — E66.01 SEVERE OBESITY (BMI 35.0-39.9) WITH COMORBIDITY (HCC): ICD-10-CM

## 2023-01-31 DIAGNOSIS — Z79.01 CURRENT USE OF LONG TERM ANTICOAGULATION: ICD-10-CM

## 2023-01-31 DIAGNOSIS — I10 ESSENTIAL HYPERTENSION: ICD-10-CM

## 2023-01-31 DIAGNOSIS — Z79.899 AT RISK FOR AMIODARONE TOXICITY WITH LONG TERM USE: ICD-10-CM

## 2023-01-31 DIAGNOSIS — I48.0 PAROXYSMAL ATRIAL FIBRILLATION (HCC): Primary | ICD-10-CM

## 2023-01-31 DIAGNOSIS — Z91.89 AT RISK FOR AMIODARONE TOXICITY WITH LONG TERM USE: ICD-10-CM

## 2023-01-31 DIAGNOSIS — I08.0 MITRAL AND AORTIC REGURGITATION: ICD-10-CM

## 2023-01-31 PROCEDURE — 1101F PT FALLS ASSESS-DOCD LE1/YR: CPT | Performed by: INTERNAL MEDICINE

## 2023-01-31 PROCEDURE — 3017F COLORECTAL CA SCREEN DOC REV: CPT | Performed by: INTERNAL MEDICINE

## 2023-01-31 PROCEDURE — 1123F ACP DISCUSS/DSCN MKR DOCD: CPT | Performed by: INTERNAL MEDICINE

## 2023-01-31 PROCEDURE — 3074F SYST BP LT 130 MM HG: CPT | Performed by: INTERNAL MEDICINE

## 2023-01-31 PROCEDURE — 99214 OFFICE O/P EST MOD 30 MIN: CPT | Performed by: INTERNAL MEDICINE

## 2023-01-31 PROCEDURE — 3078F DIAST BP <80 MM HG: CPT | Performed by: INTERNAL MEDICINE

## 2023-01-31 PROCEDURE — G8536 NO DOC ELDER MAL SCRN: HCPCS | Performed by: INTERNAL MEDICINE

## 2023-01-31 PROCEDURE — 93000 ELECTROCARDIOGRAM COMPLETE: CPT | Performed by: INTERNAL MEDICINE

## 2023-01-31 PROCEDURE — G9717 DOC PT DX DEP/BP F/U NT REQ: HCPCS | Performed by: INTERNAL MEDICINE

## 2023-01-31 PROCEDURE — G8417 CALC BMI ABV UP PARAM F/U: HCPCS | Performed by: INTERNAL MEDICINE

## 2023-01-31 PROCEDURE — G8427 DOCREV CUR MEDS BY ELIG CLIN: HCPCS | Performed by: INTERNAL MEDICINE

## 2023-01-31 RX ORDER — LISINOPRIL 40 MG/1
40 TABLET ORAL DAILY
Qty: 90 TABLET | Refills: 3 | Status: SHIPPED | OUTPATIENT
Start: 2023-01-31

## 2023-01-31 RX ORDER — LANOLIN ALCOHOL/MO/W.PET/CERES
1000 CREAM (GRAM) TOPICAL DAILY
COMMUNITY

## 2023-01-31 RX ORDER — SERTRALINE HYDROCHLORIDE 100 MG/1
100 TABLET, FILM COATED ORAL DAILY
COMMUNITY
Start: 2022-12-24

## 2023-01-31 RX ORDER — AMLODIPINE BESYLATE 5 MG/1
5 TABLET ORAL DAILY
Qty: 90 TABLET | Refills: 3 | Status: SHIPPED | OUTPATIENT
Start: 2023-01-31

## 2023-01-31 NOTE — PROGRESS NOTES
HISTORY OF PRESENT ILLNESS  Jonn Miranda is a 69 y.o. male. Follow-up of paroxysmal atrial fibrillation, hypertension, MR, AI,    1/22 noted irregular heartbeat and BP monitor for the last 2 to 3 weeks. Feeling tired for last 2 to 3 weeks in addition to chest symptoms as below. 9/19 new patient just moved from [place]. Seen for preop clearance. Has chronic back problems and peripheral neuropathy. History of paroxysmal A. Fib. Last episode 2013 approximately per patient. 4/22 feeling significantly better since cardioversion. All cardiac symptoms resolved. Has good functional capacity as he can go up 2-3 flights of steps and can walk half to 1 mile on a level ground without stopping.  2/2020 He c/o palpitations with associated SOB and chest pain with flushed sensation. Episodes last about 20 minutes and resolve. He is anticipating right venous surgery in March.  6/2020 Denies palpitations, SOB or chest pain. Follow-up  Pertinent negatives include no chest pain, no headaches and no shortness of breath. Palpitations   The history is provided by the Patient (PAF). This is a recurrent problem. Episode onset: 5/19. The problem has been resolved. The problem occurs rarely (2-3/month). On average, each episode lasts 5 minutes. The problem is associated with nothing. Associated symptoms include lower extremity edema. Pertinent negatives include no diaphoresis, no fever, no malaise/fatigue, no chest pain, no claudication, no orthopnea, no PND, no nausea, no vomiting, no headaches, no dizziness, no cough and no shortness of breath. His past medical history is significant for hypertension. Leg Swelling  The history is provided by the Patient. This is a new problem. The current episode started more than 1 week ago (3/19). The problem occurs constantly. The problem has been rapidly improving. Pertinent negatives include no chest pain, no headaches and no shortness of breath.  Nothing aggravates the symptoms. Review of Systems   Constitutional:  Negative for chills, diaphoresis, fever, malaise/fatigue and weight loss. HENT:  Negative for nosebleeds. Eyes:  Negative for discharge. Respiratory:  Negative for cough, shortness of breath and wheezing. Cardiovascular:  Positive for palpitations (PAF) and leg swelling. Negative for chest pain, orthopnea, claudication and PND. Gastrointestinal:  Negative for diarrhea, nausea and vomiting. Genitourinary:  Negative for dysuria and hematuria. Musculoskeletal:  Negative for joint pain. Skin:  Negative for rash. Neurological:  Negative for dizziness, seizures, loss of consciousness and headaches. Endo/Heme/Allergies:  Negative for polydipsia. Does not bruise/bleed easily. Psychiatric/Behavioral:  Negative for depression and substance abuse. The patient does not have insomnia.     Allergies   Allergen Reactions    Latex Itching    Codeine Rash    Codeine Hives    Doxycycline Other (comments)     Dark urine    Doxycycline Itching     Urine turned black    Topamax [Topiramate] Itching     Rash, itching    Topamax [Topiramate] Hives    Verapamil Other (comments)     tachycardia    Verapamil Palpitations       Past Medical History:   Diagnosis Date    A-fib ([facility] 1977 to present    Calculus of kidney     Cancer (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     Depression     Endocrine disease     Hypothyroidism    Fractures 1978, 2000    ankle left, left wrist    Headache     Hearing loss 2013    Hypertension     Ill-defined condition     Pain stimulator in back    Ill-defined condition     irregular heartbeat    Nausea & vomiting     Psychiatric disorder     depression    Sleep apnea     on cpap    Thyroid disease        Family History   Problem Relation Age of Onset    OSTEOARTHRITIS Mother     Bleeding Prob Mother     Cancer Mother     Headache Mother     Hypertension Mother    Ricco Franky Migraines Mother     Stroke Mother 68    OSTEOARTHRITIS Father     Stroke Father 80    OSTEOARTHRITIS Sister     Cancer Sister     Migraines Sister     OSTEOARTHRITIS Brother     Cancer Brother     Headache Brother     Migraines Brother     Stroke Paternal Uncle     Heart Surgery Paternal Uncle 72    Diabetes Other        Social History     Tobacco Use    Smoking status: Former     Types: Cigarettes     Quit date:      Years since quittin.1    Smokeless tobacco: Never   Vaping Use    Vaping Use: Never used   Substance Use Topics    Alcohol use: No    Drug use: No        Current Outpatient Medications   Medication Sig    sertraline (ZOLOFT) 100 mg tablet Take 100 mg by mouth daily.  cyanocobalamin (Vitamin B-12) 1,000 mcg tablet Take 1,000 mcg by mouth daily.  lisinopriL (PRINIVIL, ZESTRIL) 40 mg tablet Take 1 tablet by mouth once daily    amLODIPine (NORVASC) 5 mg tablet Take 1 tablet by mouth once daily    apixaban (Eliquis) 5 mg tablet Take 1 Tablet by mouth two (2) times a day.  amiodarone (CORDARONE) 200 mg tablet Take 1 Tablet by mouth daily.  levothyroxine (SYNTHROID) 100 mcg tablet Euthyrox 100 mcg tablet   Take 1 tablet by mouth once daily    polyethylene glycol (MIRALAX) 17 gram/dose powder 17 g    oxyCODONE-acetaminophen (PERCOCET 7.5) 7.5-325 mg per tablet oxycodone-acetaminophen 7.5 mg-325 mg tablet   TAKE 1 TABLET BY MOUTH EVERY 8 HOURS FOR CHRONIC PAIN    gabapentin (NEURONTIN) 300 mg capsule Take 300 mg by mouth three (3) times daily.  naloxone (NARCAN) 4 mg/actuation nasal spray Use 1 spray intranasally, then discard. Repeat with new spray every 2 min as needed for opioid overdose symptoms, alternating nostrils.  buPROPion SR (WELLBUTRIN SR) 150 mg SR tablet TAKE 1 TABLET BY MOUTH ONCE DAILY    testosterone cypionate (DEPOTESTOTERONE CYPIONATE) 200 mg/mL injection 0.5 mL by IntraMUSCular route every seven (7) days. Max Daily Amount: 100 mg.  (Patient taking differently: 100 mg by IntraMUSCular route every fourteen (14) days.)    cholecalciferol (VITAMIN D3) (1000 Units /25 mcg) tablet Take  by mouth daily. No current facility-administered medications for this visit. Past Surgical History:   Procedure Laterality Date    HX BACK SURGERY  10/10/2019    Revision SCS    HX COLONOSCOPY      HX HEENT  2003    Reconstructive surgery on face    HX KNEE REPLACEMENT  2015/2016    bilat knees    HX ORTHOPAEDIC  [id number]    back surgery X4       Visit Vitals  /67 (BP 1 Location: Left upper arm, BP Patient Position: Sitting, BP Cuff Size: Adult)   Pulse (!) 52   Ht 6' 2\" (1.88 m)   Wt 128.4 kg (283 lb)   SpO2 98%   BMI 36.34 kg/m²       Diagnostic Studies:  I have reviewed the relevant tests done on the patient and show as follows  EKG tracings reviewed by me today. EKG Results       None        1/2006 cardiac catheterization  CONCLUSION:      1. Normal left ventricular cavity size, wall motion ejection fraction. 2. Coronary artery irregularities without significant coronary atrial         stenoses. XR Results (most recent):  Results from Hospital Encounter encounter on 09/29/20    XR SPINE LUMB COMP W BEND    Narrative  Lumbar spine, 7 views including flexion and extension. INDICATION:Radiculopathy, lumbar region. COMPARISON: CT 5/29/2019    FINDINGS:    Status post posterior fusion at L2-L3, L3-L4, and L4-L5. No lucency surrounding  the screws. Flexion-extension views demonstrate degenerative retrolisthesis at  L2-L3. Evaluation of L5-S1 is limited by positioning, but there is at least 10  mm grade 1 anterolisthesis which measures approximately 12 mm with flexion. Impression  IMPRESSION:    1.  Stable postsurgical changes. No acute hardware abnormality. 2.  There is apparent increase in anterolisthesis at L5-S1 although positioning  is suboptimal. Dynamic measurements as above.  Cross-sectional imaging may be of  benefit for confirmation. 11/04/19   ECHO ADULT COMPLETE 11/04/2019 11/4/2019    Narrative · Left Ventricle: Normal cavity size and systolic function (ejection   fraction normal). Mildly to moderately increased wall thickness. Estimated   left ventricular ejection fraction is 56 - 60%. Moderate (grade 2) left   ventricular diastolic dysfunction. · Right Ventricle: Normal right ventricular size and function. · Right Atrium: Mildly dilated right atrium. · Mitral Valve: Mitral valve thickening. Mild mitral valve regurgitation   is present. · Aortic Valve: Mild aortic valve regurgitation is present. · Tricuspid Valve: Mild tricuspid valve regurgitation is present. · Pulmonary Artery: There is no evidence of pulmonary hypertension. · Aorta: Mildly dilated aortic root; diameter is 3.9 cm. Mildly dilated   ascending aorta; diameter is 4 cm. Signed by: Steve Zamora MD     12/04/19   NUCLEAR CARDIAC STRESS TEST 12/04/2019 12/4/2019    Narrative · Baseline ECG: Normal EKG, myocardial infarction. The infarction is   located in the inferior regions. .  · Negative stress test.  · Gated SPECT: Left ventricular function post-stress was normal.   Calculated ejection fraction is 64%. There is no evidence of transient   ischemic dilation (TID). The TID ratio is 0.88. · Left ventricular perfusion is normal.  · Negative myocardial perfusion imaging. Myocardial perfusion imaging   supports a low risk stress test.        Signed by: Victoriano Prabhakar MD   10/19 cardiac event monitor  Paroxysmal atrial flutter. 7/20 carotid duplex  Interpretation Summary  No significant obstructive lesions noted in the right extracranial carotid  system. < 50% left internal carotid artery stenosis. Antegrade flow noted in the vertebral arteries. Normal flow noted in the subclavian arteries. 8/20 Tilt Table  CONCLUSION: Negative tilt table test including sublingual nitroglycerin  after a fluid bolus.   8/21 echo  Interpretation Summary    LV: Estimated LVEF is 60 - 65%. Normal cavity size and systolic function (ejection fraction normal). Mild concentric hypertrophy. Wall motion: normal. Moderate (grade 2) left ventricular diastolic dysfunction. LA: Left Atrium volume index is 27.92 mL/m2. RA: Mildly dilated right atrium. AV: Mild aortic valve regurgitation is present. MV: Mild mitral valve regurgitation is present. TV: Mild tricuspid valve regurgitation is present. Right Ventricular Arterial Pressure (RVSP) is 36 mmHg. Pulmonary hypertension not suggested by Doppler findings. PV: Mild pulmonic valve regurgitation is present. AO: Aortic root is 4.2 cm, ascending aorta 4.2 cm. Comparison Study Information    Prior Study    When compared to the previous study from 11/4/19, aortic root diameter has increased from 3.9 cm and the ascending aorta diameter has increased from 4.0 cm.   01/13/22    NUCLEAR CARDIAC STRESS TEST 01/13/2022 1/14/2022    Interpretation Summary    Nuclear Findings: Normal left ventricular systolic function post-stress. LVEF measures 77%.   Nuclear Findings: Normal pharmacological myocardial perfusion study. LVEF measures 77%.   Nuclear Findings: LVEF measures 77%. LV perfusion is normal.    ECG: Resting ECG demonstrates atrial flutter.   ECG: Stress ECG was negative for ischemia.   Stress Test: A pharmacological stress test was performed using regadenoson. Hemodynamics are adequate for diagnosis. Blood pressure demonstrated a normal response and heart rate demonstrated a normal response to stress. Signed by: Shahida Ramey MD on 1/13/2022 12:08 PM    Mr. Salome Murillo has a reminder for a \"due or due soon\" health maintenance. I have asked that he contact his primary care provider for follow-up on this health maintenance. Physical Exam  Constitutional:       General: He is not in acute distress. Appearance: He is well-developed. He is obese. HENT:      Head: Normocephalic and atraumatic. Mouth/Throat:      Dentition: Normal dentition. Eyes:      General: No scleral icterus. Right eye: No discharge. Left eye: No discharge. Neck:      Thyroid: No thyromegaly. Vascular: No carotid bruit or JVD. Cardiovascular:      Rate and Rhythm: Normal rate and regular rhythm. Pulses: Intact distal pulses. Heart sounds: Normal heart sounds, S1 normal and S2 normal. No murmur heard. No friction rub. No gallop. Pulmonary:      Effort: Pulmonary effort is normal.      Breath sounds: Normal breath sounds. No wheezing or rales. Abdominal:      Palpations: Abdomen is soft. There is no mass. Tenderness: There is no abdominal tenderness. Musculoskeletal:      Cervical back: Neck supple. Right lower leg: No edema. Left lower leg: No edema. Lymphadenopathy:      Cervical:      Right cervical: No superficial cervical adenopathy. Left cervical: No superficial cervical adenopathy. Skin:     General: Skin is warm and dry. Findings: No rash. Neurological:      Mental Status: He is alert and oriented to person, place, and time. Psychiatric:         Behavior: Behavior normal.     08/06/21    ECHO ADULT COMPLETE 08/06/2021 8/6/2021    Interpretation Summary  · LV: Estimated LVEF is 60 - 65%. Normal cavity size and systolic function (ejection fraction normal). Mild concentric hypertrophy. Wall motion: normal. Moderate (grade 2) left ventricular diastolic dysfunction. · LA: Left Atrium volume index is 27.92 mL/m2. · RA: Mildly dilated right atrium. · AV: Mild aortic valve regurgitation is present. · MV: Mild mitral valve regurgitation is present. · TV: Mild tricuspid valve regurgitation is present. Right Ventricular Arterial Pressure (RVSP) is 36 mmHg. Pulmonary hypertension not suggested by Doppler findings. · PV: Mild pulmonic valve regurgitation is present. · AO: Aortic root is 4.2 cm, ascending aorta 4.2 cm.     Signed by: Ayden Constantino MD on 8/6/2021  4:03 PM    ASSESSMENT and PLAN    History of paroxysmal A. Fib. Last episode 2013 approximately per patient. Mitral and aortic regurgitation: Mild 10/19; 8/21; Aortic ectasia: 11/19 aortic root 3.9, ASC ao 4.0; 8/21 ao root 4.2, ASC ao 4.2;  Cardioversion: 3/22 successful  History of amiodarone: Given for A. Fib    TSH: Normal 3/22;   LFTs: Normal 3/22;   DLCO: 3/22 95%; Results for Suyapa Castillo. [place] Mario (MRN [id number]) as of 9/13/2019 11:15   Ref. Range 2/1/2019 08:49   Triglyceride Latest Ref Range: <150 MG/DL 63   Cholesterol, total Latest Ref Range: <200 MG/   HDL Cholesterol Latest Ref Range: 40 - 60 MG/DL 46   CHOL/HDL Ratio Latest Ref Range: 0 - 5.0   3.0   LDL, calculated Latest Ref Range: 0 - 100 MG/DL 77.4   VLDL, calculated Latest Units: MG/DL 12.6       2/2020  Continues to c/o palpitations with SOB, dizziness and flushed sensation. These occur daily and last 5-20 minutes. Will increase propafenone to 300 mg/ TID. Check EKG in 1 week. If palpitations continue, will evaluate for arrhythmias with Holter monitor. To resume lasix and compression stockings for edema. F/U with vascular as scheduled. Advised to wear CPAP consistently, this may improve SOB and palpitations. EKG - SR mild increase in QRS. Repeat EKG in 1 week. 6/2020  Reports palpitations have resolved with increased dose of prpafenone to 300 mg / TID. EKG with  - will monitor and repeat EKG in 3 months. He has lost 23 pounds since LOV and was congratulated. Diet and exercise encouraged to promote further weight loss. Continues to have edema to RLE. He is to have venous closure to saphenous vein of right leg.    7/20 patient complains of mild dizziness which is new and had orthostatic changes in PCPs office. It is likely secondary to volume depletion and weight loss. Will discontinue diuretics and follow home BP chart.   Continue other medications but if the BP stays in the low normal range, will reduce other medications gradually as well. 9/20 tilt table test was negative. Blood pressure is very good and patient is losing some weight. Reduce lisinopril and follow the home chart. As patient says that he will lose more weight. Will adjust medications as needed in future. 2/21 atrial fibrillation is staying in sinus rhythm/sinus bradycardia. QTC and QRS duration are acceptable. Blood pressure is controlled. MR and AI stable. Follow clinically. Repeat echo to follow-up on aortic ectasia as well as MR and AI. Mediterranean diet guidelines printed. 8/27/2021 AFib clinically stable. 1/6/2022 A. fib has recurred. Likely because he is not using his CPAP regularly and has gained more weight which are significant factors. We will discontinue propafenone and [place] propafenone failure for now. Start amiodarone. Side effect profile discussed and need to monitor closely. Continue Eliquis. Will need to discontinue Lexapro due to amiodarone and possible QT interval increase. Diet weight discussed and Mediterranean diet guidelines given. MR and AI stable and mild. Follow clinically. Check a stress test to evaluate any ischemia due to recurrent A. fib and atypical chest pain. 3/4/2022 continues to be in atrial flutter and taking amiodarone. Will plan cardioversion next week as discussed. Blood pressure is controlled. Stress test has not shown any ischemia. We will plan cardioversion as discussed with patient and wife. Neck step will be ablation if amiodarone fails. Labs as ordered. Discussed with patient and wife cardioversion procedure. Risks of IV sedation and DC shock explained which included, but not limited to, respiratory depression from IV sedation, failure of procedure and possibility of recurrence despite initial success. All questions were answered. Patient is willing to proceed. Diagnoses and all orders for this visit:    1.  Paroxysmal atrial fibrillation ([facility]  -     AMB POC EKG ROUTINE W/ 12 LEADS, INTER & REP    2. Essential hypertension  -     lisinopriL (PRINIVIL, ZESTRIL) 40 mg tablet; Take 1 Tablet by mouth daily. -     amLODIPine (NORVASC) 5 mg tablet; Take 1 Tablet by mouth daily. 3. Mitral and aortic regurgitation    4. Aortic ectasia, thoracic (HCC)    5. Severe obesity (BMI 35.0-39. 9) with comorbidity ([facility]    6. At risk for amiodarone toxicity with long term use  -     TSH 3RD GENERATION; Future  -     HEPATIC FUNCTION PANEL; Future    7. Current use of long term anticoagulation        Pertinent laboratory and test data reviewed and discussed with patient. See patient instructions also for other medical advice given    Medications Discontinued During This Encounter   Medication Reason    busPIRone (BUSPAR) 10 mg tablet Therapy Completed    lisinopriL (PRINIVIL, ZESTRIL) 40 mg tablet REORDER    amLODIPine (NORVASC) 5 mg tablet REORDER       Follow-up and Dispositions    Return in about 6 months (around 7/31/2023), or if symptoms worsen or fail to improve, for with ekg, post test.       1/31/2022 SP rubin is staying in sinus rhythm on amiodarone which should be continued. Follow liver and thyroid functions  Blood pressure is controlled. MR and AI and aortic ectasia will be followed by serial echocardiograms. Diet discussed and weight loss recommended. Mediterranean diet guidelines given.   He is contemplating eye surgery and since he is staying in sinus rhythm and has no history of thromboembolism, it will be okay to hold Eliquis for few days as recommended by ophthalmologist.

## 2023-01-31 NOTE — PROGRESS NOTES
1. Have you been to the ER, urgent care clinic since your last visit? Hospitalized since your last visit? Yes, [facility]     2. Have you seen or consulted any other health care providers outside of the [address] [place] [address] since your last visit? Include any pap smears or colon screening. Yes Where: PCP      3. Since your last visit, have you had any of the following symptoms?      swelling in legs/arms. 4.  Have you had any blood work, X-rays or cardiac testing? No     Requested: NO     In [facility]: NO    5. Where do you normally have your labs drawn? [facility]     6. Do you need any refills today?    Yes

## 2023-01-31 NOTE — PATIENT INSTRUCTIONS
Medications Discontinued During This Encounter   Medication Reason    busPIRone (BUSPAR) 10 mg tablet Therapy Completed    lisinopriL (PRINIVIL, ZESTRIL) 40 mg tablet REORDER    amLODIPine (NORVASC) 5 mg tablet REORDER         Learning About the Mediterranean Diet  What is the Mediterranean diet? The Mediterranean diet is a style of eating rather than a diet plan. It features foods eaten in [place], [place], [place], and other countries along the [facility]. It emphasizes eating foods like fish, fruits, vegetables, beans, high-fiber breads and whole grains, nuts, and olive oil. This style of eating includes limited red meat, cheese, and sweets. Why choose the Mediterranean diet? A Mediterranean-style diet may improve heart health. It contains more fat than other heart-healthy diets. But the fats are mainly from nuts, unsaturated oils (such as fish oils and olive oil), and certain nut or seed oils (such as canola, soybean, or flaxseed oil). These fats may help protect the heart and blood vessels. How can you get started on the Mediterranean diet? Here are some things you can do to switch to a more Mediterranean way of eating. What to eat  Eat a variety of fruits and vegetables each day, such as grapes, blueberries, tomatoes, broccoli, peppers, figs, olives, spinach, eggplant, beans, lentils, and chickpeas. Eat a variety of whole-grain foods each day, such as oats, brown rice, and whole wheat bread, pasta, and couscous. Eat fish at least 2 times a week. Try tuna, salmon, mackerel, lake trout, herring, or sardines. Eat moderate amounts of low-fat dairy products, such as milk, cheese, or yogurt. Eat moderate amounts of poultry and eggs. Choose healthy (unsaturated) fats, such as nuts, olive oil, and certain nut or seed oils like canola, soybean, and flaxseed. Limit unhealthy (saturated) fats, such as butter, palm oil, and coconut oil.  And limit fats found in animal products, such as meat and dairy products made with whole milk. Try to eat red meat only a few times a month in very small amounts. Limit sweets and desserts to only a few times a week. This includes sugar-sweetened drinks like soda. The Mediterranean diet may also include red wine with your meal--1 glass each day for women and up to 2 glasses a day for men. Tips for eating at home  Use herbs, spices, garlic, lemon zest, and citrus juice instead of salt to add flavor to foods. Add avocado slices to your sandwich instead of fuller. Have fish for lunch or dinner instead of red meat. Brush the fish with olive oil, and broil or grill it. Sprinkle your salad with seeds or nuts instead of cheese. Cook with olive or canola oil instead of butter or oils that are high in saturated fat. Switch from 2% milk or whole milk to 1% or fat-free milk. Dip raw vegetables in a vinaigrette dressing or hummus instead of dips made from mayonnaise or sour cream.  Have a piece of fruit for dessert instead of a piece of cake. Try baked apples, or have some dried fruit. Tips for eating out  Try broiled, grilled, baked, or poached fish instead of having it fried or breaded. Ask your  to have your meals prepared with olive oil instead of butter. Order dishes made with marinara sauce or sauces made from olive oil. Avoid sauces made from cream or mayonnaise. Choose whole-grain breads, whole wheat pasta and pizza crust, brown rice, beans, and lentils. Cut back on butter or margarine on bread. Instead, you can dip your bread in a small amount of olive oil. Ask for a side salad or grilled vegetables instead of french fries or chips. Where can you learn more? Go to http://www.[employer].com/  Enter O407 in the search box to learn more about \"Learning About the Mediterranean Diet. \"  Current as of: May 9, 2022               Content Version: 13.4  © 7895-9686 [employer].    Care instructions adapted under license by [employer] (which disclaims liability or warranty for this information). If you have questions about a medical condition or this instruction, always ask your healthcare professional. Demetrio Barajas any warranty or liability for your use of this information.

## 2023-02-24 ENCOUNTER — TELEPHONE (OUTPATIENT)
Age: 68
End: 2023-02-24

## 2023-02-24 DIAGNOSIS — Z79.899 OTHER LONG TERM (CURRENT) DRUG THERAPY: Primary | ICD-10-CM

## 2023-02-24 RX ORDER — LEVOTHYROXINE SODIUM 0.15 MG/1
TABLET ORAL
Qty: 30 TABLET | Refills: 6 | Status: CANCELLED | OUTPATIENT
Start: 2023-02-24

## 2023-02-24 NOTE — TELEPHONE ENCOUNTER
Patient is aware that he needs to increased to 150 mg daily and repeat labs in 2 months. Patient is aware and will mail lab orders to patient.

## 2023-02-24 NOTE — TELEPHONE ENCOUNTER
----- Message from Shivam Burr MD sent at 2/24/2023  1:43 PM EST -----  Please contact patient and do the following asap    TSH is elevated. Increase Synthroid to 150 mcg a day. Please make sure patient is compliant with the previous dose of 100 mcg a day.   Repeat TSH in 2 months

## 2023-02-27 ENCOUNTER — OFFICE VISIT (OUTPATIENT)
Age: 68
End: 2023-02-27
Payer: COMMERCIAL

## 2023-02-27 VITALS
SYSTOLIC BLOOD PRESSURE: 143 MMHG | HEIGHT: 74 IN | BODY MASS INDEX: 35.68 KG/M2 | DIASTOLIC BLOOD PRESSURE: 87 MMHG | HEART RATE: 112 BPM | WEIGHT: 278 LBS | OXYGEN SATURATION: 99 %

## 2023-02-27 DIAGNOSIS — I50.31 ACUTE DIASTOLIC CONGESTIVE HEART FAILURE (HCC): ICD-10-CM

## 2023-02-27 DIAGNOSIS — E66.01 MORBID (SEVERE) OBESITY DUE TO EXCESS CALORIES (HCC): ICD-10-CM

## 2023-02-27 DIAGNOSIS — I48.0 PAROXYSMAL ATRIAL FIBRILLATION (HCC): Primary | ICD-10-CM

## 2023-02-27 DIAGNOSIS — I77.810 THORACIC AORTIC ECTASIA (HCC): ICD-10-CM

## 2023-02-27 DIAGNOSIS — Z91.89 AT RISK FOR AMIODARONE TOXICITY WITH LONG TERM USE: ICD-10-CM

## 2023-02-27 DIAGNOSIS — Z79.899 AT RISK FOR AMIODARONE TOXICITY WITH LONG TERM USE: ICD-10-CM

## 2023-02-27 DIAGNOSIS — I10 ESSENTIAL (PRIMARY) HYPERTENSION: ICD-10-CM

## 2023-02-27 PROCEDURE — 93000 ELECTROCARDIOGRAM COMPLETE: CPT | Performed by: INTERNAL MEDICINE

## 2023-02-27 PROCEDURE — 3079F DIAST BP 80-89 MM HG: CPT | Performed by: INTERNAL MEDICINE

## 2023-02-27 PROCEDURE — 3077F SYST BP >= 140 MM HG: CPT | Performed by: INTERNAL MEDICINE

## 2023-02-27 PROCEDURE — 99214 OFFICE O/P EST MOD 30 MIN: CPT | Performed by: INTERNAL MEDICINE

## 2023-02-27 PROCEDURE — 1123F ACP DISCUSS/DSCN MKR DOCD: CPT | Performed by: INTERNAL MEDICINE

## 2023-02-27 RX ORDER — FUROSEMIDE 40 MG/1
40 TABLET ORAL DAILY PRN
Qty: 90 TABLET | Refills: 1 | Status: SHIPPED | OUTPATIENT
Start: 2023-02-27

## 2023-02-27 RX ORDER — LEVOTHYROXINE SODIUM 0.15 MG/1
150 TABLET ORAL DAILY
Qty: 30 TABLET | Refills: 5 | Status: SHIPPED | OUTPATIENT
Start: 2023-02-27

## 2023-02-27 ASSESSMENT — PATIENT HEALTH QUESTIONNAIRE - PHQ9
SUM OF ALL RESPONSES TO PHQ QUESTIONS 1-9: 0
1. LITTLE INTEREST OR PLEASURE IN DOING THINGS: 0
SUM OF ALL RESPONSES TO PHQ QUESTIONS 1-9: 0
SUM OF ALL RESPONSES TO PHQ9 QUESTIONS 1 & 2: 0
DEPRESSION UNABLE TO ASSESS: FUNCTIONAL CAPACITY MOTIVATION LIMITS ACCURACY
2. FEELING DOWN, DEPRESSED OR HOPELESS: 0

## 2023-02-27 ASSESSMENT — ENCOUNTER SYMPTOMS
GASTROINTESTINAL NEGATIVE: 1
EYES NEGATIVE: 1
RESPIRATORY NEGATIVE: 1

## 2023-02-27 NOTE — PROGRESS NOTES
David Whipple is a 69y.o. year old male. Follow-up of paroxysmal atrial fibrillation, hypertension, MR, AI,    1/22 noted irregular heartbeat and BP monitor for the last 2 to 3 weeks. Feeling tired for last 2 to 3 weeks in addition to chest symptoms as below. 9/19 new patient just moved from [place]. Seen for preop clearance. Has chronic back problems and peripheral neuropathy. History of paroxysmal A. Fib. Last episode 2013 approximately per patient. 4/22 feeling significantly better since cardioversion. All cardiac symptoms resolved. Has good functional capacity as he can go up 2-3 flights of steps and can walk half to 1 mile on a level ground without stopping.  2/2020 He c/o palpitations with associated SOB and chest pain with flushed sensation. Episodes last about 20 minutes and resolve. He is anticipating right venous surgery in March.  6/2020 Denies palpitations, SOB or chest pain. 2/23 seen because of thyroid hormone not being normal by recent testing as well as recurrent palpitations and thinks that he is back in A-fib. Review of Systems   Constitutional:  Positive for fatigue. HENT: Negative. Eyes: Negative. Respiratory: Negative. Cardiovascular:  Positive for palpitations and leg swelling. Gastrointestinal: Negative. Endocrine: Negative. Genitourinary: Negative. Musculoskeletal: Negative. Neurological: Negative. Psychiatric/Behavioral: Negative. All other systems reviewed and are negative. Physical Exam  Vitals and nursing note reviewed. Constitutional:       Appearance: Normal appearance. He is obese. HENT:      Head: Normocephalic and atraumatic. Nose: Nose normal.   Eyes:      Conjunctiva/sclera: Conjunctivae normal.   Cardiovascular:      Rate and Rhythm: Normal rate. Rhythm irregular. Pulses: Normal pulses. Heart sounds: Normal heart sounds.    Pulmonary:      Effort: Pulmonary effort is normal.      Breath sounds: Normal breath sounds. Abdominal:      General: Bowel sounds are normal.      Palpations: Abdomen is soft. Musculoskeletal:         General: Normal range of motion. Right lower leg: No edema. Left lower leg: No edema. Skin:     General: Skin is warm and dry. Neurological:      General: No focal deficit present. Mental Status: He is alert and oriented to person, place, and time. Psychiatric:         Mood and Affect: Mood normal.         Behavior: Behavior normal.          ASSESSMENT & PLAN    Lab Results   Component Value Date    CHOL 146 10/13/2021     Lab Results   Component Value Date    TRIG 72 10/13/2021     Lab Results   Component Value Date    HDL 45 10/13/2021     Lab Results   Component Value Date    LDLCALC 87 10/13/2021     No results found for: LABVLDL, VLDL  Lab Results   Component Value Date    CHOLHDLRATIO 3.2 10/13/2021         Follow-up  Pertinent negatives include no chest pain, no headaches and no shortness of breath. Palpitations   The history is provided by the Patient (PAF). This is a recurrent problem. Episode onset: 5/19. The problem has been resolved. The problem occurs rarely (2-3/month). On average, each episode lasts 5 minutes. The problem is associated with nothing. Associated symptoms include lower extremity edema. Pertinent negatives include no diaphoresis, no fever, no malaise/fatigue, no chest pain, no claudication, no orthopnea, no PND, no nausea, no vomiting, no headaches, no dizziness, no cough and no shortness of breath. His past medical history is significant for hypertension. Leg Swelling  The history is provided by the Patient. This is a new problem. The current episode started more than 1 week ago (3/19). The problem occurs constantly. The problem has been rapidly improving. Pertinent negatives include no chest pain, no headaches and no shortness of breath. Nothing aggravates the symptoms.    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    Depression     Endocrine disease     Hypothyroidism    Fractures ,     ankle left, left wrist    Headache     Hearing loss     Hypertension     Ill-defined condition     irregular heartbeat    Ill-defined condition     Pain stimulator in back    Nausea & vomiting     Psychiatric disorder     depression    Sleep apnea     on cpap    Thyroid disease        Family History   Problem Relation Age of Onset    Heart Surgery Paternal Uncle 72    Diabetes Other     Stroke Paternal Uncle     Migraines Brother     Headache Brother     Cancer Brother     Osteoarthritis Brother     Migraines Sister     Cancer Sister     Osteoarthritis Sister     Stroke Father 80    Osteoarthritis Father     Stroke Mother 68    Migraines Mother     Hypertension Mother     Headache Mother     Cancer Mother     Osteoarthritis Mother     Bleeding Prob Mother        Social History     Tobacco Use    Smoking status: Former     Types: Cigarettes     Quit date: 1987     Years since quittin.1    Smokeless tobacco: Never   Substance Use Topics    Alcohol use: No    Drug use: No        Current Outpatient Medications   Medication Sig Dispense Refill    sertraline (ZOLOFT) 50 MG tablet TAKE 1 TABLET BY MOUTH ONCE DAILY      cyanocobalamin 1000 MCG tablet Take 1,000 mcg by mouth daily      levothyroxine (SYNTHROID) 150 MCG tablet Take 1 tablet by mouth Daily 30 tablet 5    furosemide (LASIX) 40 MG tablet Take 1 tablet by mouth daily as needed (Edema) 90 tablet 1    amiodarone (CORDARONE) 200 MG tablet Take 200 mg by mouth daily      amLODIPine (NORVASC) 5 MG tablet Take 1 tablet by mouth once daily      apixaban (ELIQUIS) 5 MG TABS tablet Take 5 mg by mouth 2 times daily      buPROPion (WELLBUTRIN SR) 150 MG extended release tablet TAKE 1 TABLET BY MOUTH ONCE DAILY      gabapentin (NEURONTIN) 300 MG capsule Take 300 mg by mouth 3 times daily. lisinopril (PRINIVIL;ZESTRIL) 40 MG tablet Take 1 tablet by mouth once daily      naloxone 4 MG/0.1ML LIQD nasal spray Use 1 spray intranasally, then discard. Repeat with new spray every 2 min as needed for opioid overdose symptoms, alternating nostrils. oxyCODONE-acetaminophen (PERCOCET) 7.5-325 MG per tablet oxycodone-acetaminophen 7.5 mg-325 mg tablet   TAKE 1 TABLET BY MOUTH EVERY 8 HOURS FOR CHRONIC PAIN      polyethylene glycol (GLYCOLAX) 17 GM/SCOOP powder 17 g      testosterone cypionate (DEPOTESTOTERONE CYPIONATE) 200 MG/ML injection Inject 100 mg into the muscle every 7 days. No current facility-administered medications for this visit. Past Surgical History:   Procedure Laterality Date    BACK SURGERY  10/10/2019    Revision SCS    COLONOSCOPY      HEENT  2003    Reconstructive surgery on face    ORTHOPEDIC SURGERY  [id number]    back surgery X4    TOTAL KNEE ARTHROPLASTY  2015/2016    bilat knees       Vitals:    02/27/23 1008 02/27/23 1020   BP: (!) 154/90 (!) 143/87   Site: Left Upper Arm Left Upper Arm   Position: Sitting Sitting   Cuff Size: Medium Adult Large Adult   Pulse: (!) 107 (!) 112   SpO2: 98% 99%   Weight: 278 lb (126.1 kg)    Height: 6' 2\" (1.88 m)           Diagnostic Studies:  I have reviewed the relevant tests done on the patient and show as follows  EKG tracings reviewed by me today. No results found for this or any previous visit (from the past 4464 hour(s)). Xray Result (most recent):  XR LUMBAR SPINE (MIN 6 VIEWS) 09/29/2020    Narrative  Lumbar spine, 7 views including flexion and extension.     INDICATION:Radiculopathy, lumbar region. COMPARISON: CT 5/29/2019    FINDINGS:    Status post posterior fusion at L2-L3, L3-L4, and L4-L5. No lucency surrounding  the screws. Flexion-extension views demonstrate degenerative retrolisthesis at  L2-L3. Evaluation of L5-S1 is limited by positioning, but there is at least 10  mm grade 1 anterolisthesis which measures approximately 12 mm with flexion. Impression  IMPRESSION:    1.  Stable postsurgical changes. No acute hardware abnormality. 2.  There is apparent increase in anterolisthesis at L5-S1 although positioning  is suboptimal. Dynamic measurements as above. Cross-sectional imaging may be of  benefit for confirmation. 08/06/21    TRANSTHORACIC ECHOCARDIOGRAM (TTE) COMPLETE (CONTRAST/BUBBLE/3D PRN) 08/06/2021  3:48 PM, 08/06/2021 12:00 AM (Final)    Narrative  This is a summary report. The complete report is available in the patient's medical record. If you cannot access the medical record, please contact the sending organization for a detailed fax or copy. · LV: Estimated LVEF is 60 - 65%. Normal cavity size and systolic function (ejection fraction normal). Mild concentric hypertrophy. Wall motion: normal. Moderate (grade 2) left ventricular diastolic dysfunction. · LA: Left Atrium volume index is 27.92 mL/m2. · RA: Mildly dilated right atrium. · AV: Mild aortic valve regurgitation is present. · MV: Mild mitral valve regurgitation is present. · TV: Mild tricuspid valve regurgitation is present. Right Ventricular Arterial Pressure (RVSP) is 36 mmHg. Pulmonary hypertension not suggested by Doppler findings. · PV: Mild pulmonic valve regurgitation is present. · AO: Aortic root is 4.2 cm, ascending aorta 4.2 cm. Signed by: Marlys Walsh MD on 8/6/2021  3:48 PM, Signed by: Unknown Provider Result on 8/6/2021 12:00 AM    01/13/22    NM STRESS TEST WITH MYOCARDIAL PERFUSION 01/13/2022 12:08 PM, 01/13/2022 12:00 AM (Final)    Narrative  This is a summary report.  The complete report is available in the patient's medical record. If you cannot access the medical record, please contact the sending organization for a detailed fax or copy. Nuclear Findings: Normal left ventricular systolic function post-stress. LVEF measures 77%. Nuclear Findings: Normal pharmacological myocardial perfusion study. LVEF measures 77%. Nuclear Findings: LVEF measures 77%. LV perfusion is normal.    ECG: Resting ECG demonstrates atrial flutter. ECG: Stress ECG was negative for ischemia. Stress Test: A pharmacological stress test was performed using regadenoson. Hemodynamics are adequate for diagnosis. Blood pressure demonstrated a normal response and heart rate demonstrated a normal response to stress. Signed by: Leticia Chaudhry MD on 1/13/2022 12:08 PM, Signed by: Unknown Provider Result on 1/13/2022 12:00 AM    No results found for this or any previous visit. Mr. Thierry Schafer has a reminder for a \"due or due soon\" health maintenance. I have asked that he contact his primary care provider for follow-up on this health maintenance. 1/2006 cardiac catheterization  CONCLUSION:      1. Normal left ventricular cavity size, wall motion ejection fraction. 2. Coronary artery irregularities without significant coronary atrial         stenoses. 11/04/19   ECHO ADULT COMPLETE 11/04/2019 11/4/2019    Narrative · Left Ventricle: Normal cavity size and systolic function (ejection   fraction normal). Mildly to moderately increased wall thickness. Estimated   left ventricular ejection fraction is 56 - 60%. Moderate (grade 2) left   ventricular diastolic dysfunction. · Right Ventricle: Normal right ventricular size and function. · Right Atrium: Mildly dilated right atrium. · Mitral Valve: Mitral valve thickening. Mild mitral valve regurgitation   is present. · Aortic Valve: Mild aortic valve regurgitation is present. · Tricuspid Valve: Mild tricuspid valve regurgitation is present.   · Pulmonary Artery: There is no evidence of pulmonary hypertension. · Aorta: Mildly dilated aortic root; diameter is 3.9 cm. Mildly dilated   ascending aorta; diameter is 4 cm. Signed by: Maryam Dougherty MD     12/04/19   NUCLEAR CARDIAC STRESS TEST 12/04/2019 12/4/2019    Narrative · Baseline ECG: Normal EKG, myocardial infarction. The infarction is   located in the inferior regions. .  · Negative stress test.  · Gated SPECT: Left ventricular function post-stress was normal.   Calculated ejection fraction is 64%. There is no evidence of transient   ischemic dilation (TID). The TID ratio is 0.88. · Left ventricular perfusion is normal.  · Negative myocardial perfusion imaging. Myocardial perfusion imaging   supports a low risk stress test.        Signed by: Baudilio Smith MD   10/19 cardiac event monitor  Paroxysmal atrial flutter. 7/20 carotid duplex  Interpretation Summary  No significant obstructive lesions noted in the right extracranial carotid  system. < 50% left internal carotid artery stenosis. Antegrade flow noted in the vertebral arteries. Normal flow noted in the subclavian arteries. 8/20 Tilt Table  CONCLUSION: Negative tilt table test including sublingual nitroglycerin  after a fluid bolus. 8/21 echo  Interpretation Summary    LV: Estimated LVEF is 60 - 65%. Normal cavity size and systolic function (ejection fraction normal). Mild concentric hypertrophy. Wall motion: normal. Moderate (grade 2) left ventricular diastolic dysfunction. LA: Left Atrium volume index is 27.92 mL/m2. RA: Mildly dilated right atrium. AV: Mild aortic valve regurgitation is present. MV: Mild mitral valve regurgitation is present. TV: Mild tricuspid valve regurgitation is present. Right Ventricular Arterial Pressure (RVSP) is 36 mmHg. Pulmonary hypertension not suggested by Doppler findings. PV: Mild pulmonic valve regurgitation is present. AO: Aortic root is 4.2 cm, ascending aorta 4.2 cm.       Comparison Study Information    Prior Study    When compared to the previous study from 11/4/19, aortic root diameter has increased from 3.9 cm and the ascending aorta diameter has increased from 4.0 cm.     08/06/21    ECHO ADULT COMPLETE 08/06/2021 8/6/2021    Interpretation Summary  · LV: Estimated LVEF is 60 - 65%. Normal cavity size and systolic function (ejection fraction normal). Mild concentric hypertrophy. Wall motion: normal. Moderate (grade 2) left ventricular diastolic dysfunction. · LA: Left Atrium volume index is 27.92 mL/m2. · RA: Mildly dilated right atrium. · AV: Mild aortic valve regurgitation is present. · MV: Mild mitral valve regurgitation is present. · TV: Mild tricuspid valve regurgitation is present. Right Ventricular Arterial Pressure (RVSP) is 36 mmHg. Pulmonary hypertension not suggested by Doppler findings. · PV: Mild pulmonic valve regurgitation is present. · AO: Aortic root is 4.2 cm, ascending aorta 4.2 cm. Signed by: Lev Joseph MD on 8/6/2021  4:03 PM    01/13/22    NUCLEAR CARDIAC STRESS TEST 01/13/2022 1/14/2022    Interpretation Summary    Nuclear Findings: Normal left ventricular systolic function post-stress. LVEF measures 77%. Nuclear Findings: Normal pharmacological myocardial perfusion study. LVEF measures 77%. Nuclear Findings: LVEF measures 77%. LV perfusion is normal.    ECG: Resting ECG demonstrates atrial flutter. ECG: Stress ECG was negative for ischemia. Stress Test: A pharmacological stress test was performed using regadenoson. Hemodynamics are adequate for diagnosis. Blood pressure demonstrated a normal response and heart rate demonstrated a normal response to stress. Signed by: Zechariah Shea MD on 1/13/2022 12:08 PM    Mr. Mireya Willson has a reminder for a \"due or due soon\" health maintenance. I have asked that he contact his primary care provider for follow-up on this health maintenance. ASSESSMENT and PLAN    History of paroxysmal A. Fib.   Last episode 2013 approximately per patient. Mitral and aortic regurgitation: Mild 10/19; 8/21; Aortic ectasia: 11/19 aortic root 3.9, ASC ao 4.0; 8/21 ao root 4.2, ASC ao 4.2;  Cardioversion: 3/22 successful  History of amiodarone: Given for A. Fib    TSH: Normal 3/22;   LFTs: Normal 3/22;   DLCO: 3/22 95%; Results for Kaleb Luiedith. John Huff (MRN [id number]) as of 9/13/2019 11:15   Ref. Range 2/1/2019 08:49   Triglyceride Latest Ref Range: <150 MG/DL 63   Cholesterol, total Latest Ref Range: <200 MG/   HDL Cholesterol Latest Ref Range: 40 - 60 MG/DL 46   CHOL/HDL Ratio Latest Ref Range: 0 - 5.0   3.0   LDL, calculated Latest Ref Range: 0 - 100 MG/DL 77.4   VLDL, calculated Latest Units: MG/DL 12.6       2/2020  Continues to c/o palpitations with SOB, dizziness and flushed sensation. These occur daily and last 5-20 minutes. Will increase propafenone to 300 mg/ TID. Check EKG in 1 week. If palpitations continue, will evaluate for arrhythmias with Holter monitor. To resume lasix and compression stockings for edema. F/U with vascular as scheduled. Advised to wear CPAP consistently, this may improve SOB and palpitations. EKG - SR mild increase in QRS. Repeat EKG in 1 week. 6/2020  Reports palpitations have resolved with increased dose of prpafenone to 300 mg / TID. EKG with  - will monitor and repeat EKG in 3 months. He has lost 23 pounds since LOV and was congratulated. Diet and exercise encouraged to promote further weight loss. Continues to have edema to RLE. He is to have venous closure to saphenous vein of right leg.    7/20 patient complains of mild dizziness which is new and had orthostatic changes in PCPs office. It is likely secondary to volume depletion and weight loss. Will discontinue diuretics and follow home BP chart. Continue other medications but if the BP stays in the low normal range, will reduce other medications gradually as well.     9/20 tilt table test was negative. Blood pressure is very good and patient is losing some weight. Reduce lisinopril and follow the home chart. As patient says that he will lose more weight. Will adjust medications as needed in future. 2/21 atrial fibrillation is staying in sinus rhythm/sinus bradycardia. QTC and QRS duration are acceptable. Blood pressure is controlled. MR and AI stable. Follow clinically. Repeat echo to follow-up on aortic ectasia as well as MR and AI. Mediterranean diet guidelines printed. 8/27/2021 AFib clinically stable. 1/6/2022 A. fib has recurred. Likely because he is not using his CPAP regularly and has gained more weight which are significant factors. We will discontinue propafenone and Col[place]ta propafenone failure for now. Start amiodarone. Side effect profile discussed and need to monitor closely. Continue Eliquis. Will need to discontinue Lexapro due to amiodarone and possible QT interval increase. Diet weight discussed and Mediterranean diet guidelines given. MR and AI stable and mild. Follow clinically. Check a stress test to evaluate any ischemia due to recurrent A. fib and atypical chest pain. 3/4/2022 continues to be in atrial flutter and taking amiodarone. Will plan cardioversion next week as discussed. Blood pressure is controlled. Stress test has not shown any ischemia. We will plan cardioversion as discussed with patient and wife. Neck step will be ablation if amiodarone fails. Labs as ordered. Discussed with patient and wife cardioversion procedure. Risks of IV sedation and DC shock explained which included, but not limited to, respiratory depression from IV sedation, failure of procedure and possibility of recurrence despite initial success. All questions were answered. Patient is willing to proceed. 1/31/2022 A. fib is staying in sinus rhythm on amiodarone which should be continued. Follow liver and thyroid functions  Blood pressure is controlled.   MR and AI and aortic ectasia will be followed by serial echocardiograms. Diet discussed and weight loss recommended. Mediterranean diet guidelines given. He is contemplating eye surgery and since he is staying in sinus rhythm and has no history of thromboembolism, it will be okay to hold Eliquis for few days as recommended by ophthalmologist.    Sudarshan Dhaliwal was seen today for follow-up. Diagnoses and all orders for this visit:    Paroxysmal atrial fibrillation (HCC)  -     EKG 12 Lead  -     levothyroxine (SYNTHROID) 150 MCG tablet; Take 1 tablet by mouth Daily  -     Cardiac event monitor; Future    Essential (primary) hypertension    Thoracic aortic ectasia (HCC)    Morbid (severe) obesity due to excess calories (HCC)    Acute diastolic congestive heart failure (HCC)  -     furosemide (LASIX) 40 MG tablet; Take 1 tablet by mouth daily as needed (Edema)    At risk for amiodarone toxicity with long term use  -     levothyroxine (SYNTHROID) 150 MCG tablet; Take 1 tablet by mouth Daily        Pertinent laboratory and test data reviewed and discussed with patient. See patient instructions also for other medical advice given    Medications Discontinued During This Encounter   Medication Reason    levothyroxine (SYNTHROID) 150 MCG tablet REORDER    busPIRone (BUSPAR) 10 MG tablet Alternate therapy    vitamin D (CHOLECALCIFEROL) 25 MCG (1000 UT) TABS tablet Therapy completed       Follow-up and Dispositions    Return in about 2 months (around 4/27/2023), or if symptoms worsen or fail to improve, for post test, BP log x 3-5 days post med changes. Return to ER if any significant CP not relieved by s/l NTG, severe SOB, severe palpitations, loss of consciousness    2/27/2023 though patient felt some irregular heartbeats, he is in normal sinus rhythm today. We will get an event monitor to follow-up and check for any atrial fibrillation episodes.   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 Hopefully his CHF may also respond to higher dose of Synthroid and he will take Lasix as needed. Blood pressure is elevated and hopefully will reduce as he takes diuretics. Follow the home BP chart. He also has pain in his tooth which needs to be removed. He will be cleared with low risk to hold his Eliquis. He also needs a facial surgery for the eyelid which should probably wait till the results of the event monitor as that is not urgent. Weight loss recommended and reinforced again. They already have Mediterranean diet guidelines. [Normal Growth] : growth [Normal Development] : development [None] : No known medical problems [No Elimination Concerns] : elimination [No Feeding Concerns] : feeding [No Skin Concerns] : skin [Normal Sleep Pattern] : sleep [Communication and Social Development] : communication and social development [Sleep Routines and Issues] : sleep routines and issues [Temper Tantrums and Discipline] : temper tantrums and discipline [Healthy Teeth] : healthy teeth [Safety] : safety [No Medications] : ~He/She~ is not on any medications [Parent/Guardian] : parent/guardian [] : The components of the vaccine(s) to be administered today are listed in the plan of care. The disease(s) for which the vaccine(s) are intended to prevent and the risks have been discussed with the caretaker.  The risks are also included in the appropriate vaccination information statements which have been provided to the patient's caregiver.  The caregiver has given consent to vaccinate. [FreeTextEntry1] : \par 15 months old M\par Continue whole cow's milk in a cup. Discussed discontinuing bottles. Continue table foods, 3 meals with 2-3 snacks per day. Incorporate fluorinated water daily. Brush teeth twice a day with soft toothbrush. Recommend visit to dentist. When in car, keep child in rear-facing car seats until age 2, or until  the maximum height and weight for seat is reached. Put baby to sleep in own crib. Lower crib mattress. Help baby to maintain consistent daily routines and sleep schedule. Recognize stranger and separation anxiety. Ensure home is safe since baby is increasingly mobile. Be within arm's reach of baby at all times. Use consistent, positive discipline. Read aloud to baby.\par Vaccines given today, SE, risks and benefits explained, cool compresses and Acetaminophen as needed.\par  \par Return in 3 mo for 18 mo well child check.\par

## 2023-02-27 NOTE — PROGRESS NOTES
1. Have you been to the ER, urgent care clinic since your last visit? Hospitalized since your last visit? No      2. Where do you normally have your labs drawn? [facility]    3. Do you need any refills today? No    4. Which local pharmacy do you use (enter pharmacy)? [facility]    5. Which mail order pharmacy do you use (enter pharmacy)? No     6. Are you here for surgical clearance and if so who will be doing your     procedure/surgery (care team)?    No

## 2023-02-27 NOTE — PATIENT INSTRUCTIONS
The medication list included in this document is our record of what you are currently taking, including any changes that were made at today's visit. If you find any differences when compared to your medications at home, or have any questions that were not answered at your visit, please contact the office. After the recommended changes have been made in blood pressure medicines, patient advised to keep BP/HR(pulse rate) chart twice daily and bring us results in next 4 to 5 days. Patient may send the results via \"My Chart\" if desired. Please rest for 5-10 minutes before checking blood pressure. Sit on a comfortable chair without crossing the legs and put your arm on a table. We recommend that you use an upper arm cuff. Check the blood pressure 3 times each time you check the blood pressure and record the lowest reading. If you check the blood pressure in both arms, use the higher reading.

## 2023-04-24 ENCOUNTER — OFFICE VISIT (OUTPATIENT)
Age: 68
End: 2023-04-24
Payer: COMMERCIAL

## 2023-04-24 VITALS
HEIGHT: 74 IN | OXYGEN SATURATION: 99 % | BODY MASS INDEX: 36.45 KG/M2 | HEART RATE: 50 BPM | DIASTOLIC BLOOD PRESSURE: 59 MMHG | WEIGHT: 284 LBS | SYSTOLIC BLOOD PRESSURE: 109 MMHG

## 2023-04-24 DIAGNOSIS — I48.0 PAROXYSMAL ATRIAL FIBRILLATION (HCC): Primary | ICD-10-CM

## 2023-04-24 DIAGNOSIS — Z91.89 AT RISK FOR AMIODARONE TOXICITY WITH LONG TERM USE: ICD-10-CM

## 2023-04-24 DIAGNOSIS — I10 ESSENTIAL (PRIMARY) HYPERTENSION: ICD-10-CM

## 2023-04-24 DIAGNOSIS — E66.01 MORBID (SEVERE) OBESITY DUE TO EXCESS CALORIES (HCC): ICD-10-CM

## 2023-04-24 DIAGNOSIS — I08.0 MITRAL AND AORTIC REGURGITATION: ICD-10-CM

## 2023-04-24 DIAGNOSIS — I77.810 THORACIC AORTIC ECTASIA (HCC): ICD-10-CM

## 2023-04-24 DIAGNOSIS — Z79.899 AT RISK FOR AMIODARONE TOXICITY WITH LONG TERM USE: ICD-10-CM

## 2023-04-24 PROCEDURE — 1123F ACP DISCUSS/DSCN MKR DOCD: CPT | Performed by: INTERNAL MEDICINE

## 2023-04-24 PROCEDURE — 3074F SYST BP LT 130 MM HG: CPT | Performed by: INTERNAL MEDICINE

## 2023-04-24 PROCEDURE — 99214 OFFICE O/P EST MOD 30 MIN: CPT | Performed by: INTERNAL MEDICINE

## 2023-04-24 PROCEDURE — 3078F DIAST BP <80 MM HG: CPT | Performed by: INTERNAL MEDICINE

## 2023-04-24 RX ORDER — OMEPRAZOLE 40 MG/1
40 CAPSULE, DELAYED RELEASE ORAL DAILY
COMMUNITY
Start: 2023-03-20

## 2023-04-24 RX ORDER — LISINOPRIL 20 MG/1
20 TABLET ORAL DAILY
Qty: 90 TABLET | Refills: 3
Start: 2023-04-24

## 2023-04-24 RX ORDER — APIXABAN 5 MG/1
TABLET, FILM COATED ORAL
Qty: 60 TABLET | Refills: 0 | Status: SHIPPED | OUTPATIENT
Start: 2023-04-24

## 2023-04-24 ASSESSMENT — PATIENT HEALTH QUESTIONNAIRE - PHQ9
SUM OF ALL RESPONSES TO PHQ QUESTIONS 1-9: 0
2. FEELING DOWN, DEPRESSED OR HOPELESS: 0
SUM OF ALL RESPONSES TO PHQ9 QUESTIONS 1 & 2: 0
SUM OF ALL RESPONSES TO PHQ QUESTIONS 1-9: 0
DEPRESSION UNABLE TO ASSESS: FUNCTIONAL CAPACITY MOTIVATION LIMITS ACCURACY
1. LITTLE INTEREST OR PLEASURE IN DOING THINGS: 0

## 2023-04-24 ASSESSMENT — ENCOUNTER SYMPTOMS
SHORTNESS OF BREATH: 1
GASTROINTESTINAL NEGATIVE: 1
EYES NEGATIVE: 1

## 2023-04-24 NOTE — PROGRESS NOTES
1. Have you been to the ER, urgent care clinic since your last visit? Hospitalized since your last visit? No      2. Where do you normally have your labs drawn? [facility]    3. Do you need any refills today? Yes    4. Which local pharmacy do you use (enter pharmacy)? [facility]    5. Which mail order pharmacy do you use (enter pharmacy)? No     6. Are you here for surgical clearance and if so who will be doing your     procedure/surgery (care team)?    No

## 2023-04-24 NOTE — PROGRESS NOTES
Michelle Camarena. is a 69y.o. year old male. Follow-up of paroxysmal atrial fibrillation, hypertension, MR, AI,    1/22 noted irregular heartbeat and BP monitor for the last 2 to 3 weeks. Feeling tired for last 2 to 3 weeks in addition to chest symptoms as below. 9/19 new patient just moved from [place]. Seen for preop clearance. Has chronic back problems and peripheral neuropathy. History of paroxysmal A. Fib. Last episode 2013 approximately per patient. 4/22 feeling significantly better since cardioversion. All cardiac symptoms resolved. Has good functional capacity as he can go up 2-3 flights of steps and can walk half to 1 mile on a level ground without stopping.  2/2020 He c/o palpitations with associated SOB and chest pain with flushed sensation. Episodes last about 20 minutes and resolve. He is anticipating right venous surgery in March.  6/2020 Denies palpitations, SOB or chest pain. 2/23 seen because of thyroid hormone not being normal by recent testing as well as recurrent palpitations and thinks that he is back in A-fib.  4/24/2023 status post event monitor which has revealed frequent PACs up to 2-3 beat grouped PACs as well but no A-fib. As sometimes these palpitations are associated with shortness of breath. Trace edema seen intermittently. Review of Systems   Constitutional:  Positive for fatigue. HENT: Negative. Eyes: Negative. Respiratory:  Positive for shortness of breath. Cardiovascular:  Positive for palpitations and leg swelling. Gastrointestinal: Negative. Endocrine: Negative. Genitourinary: Negative. Musculoskeletal: Negative. Neurological: Negative. Psychiatric/Behavioral: Negative. All other systems reviewed and are negative. Physical Exam  Vitals and nursing note reviewed. Constitutional:       Appearance: Normal appearance. He is obese. HENT:      Head: Normocephalic and atraumatic.       Nose: Nose normal.   Eyes:

## 2023-05-22 RX ORDER — AMIODARONE HYDROCHLORIDE 200 MG/1
TABLET ORAL
Qty: 90 TABLET | Refills: 0 | Status: SHIPPED | OUTPATIENT
Start: 2023-05-22

## 2023-07-05 ENCOUNTER — OFFICE VISIT (OUTPATIENT)
Age: 68
End: 2023-07-05
Payer: COMMERCIAL

## 2023-07-05 VITALS
DIASTOLIC BLOOD PRESSURE: 61 MMHG | HEART RATE: 57 BPM | HEIGHT: 74 IN | OXYGEN SATURATION: 96 % | WEIGHT: 282 LBS | BODY MASS INDEX: 36.19 KG/M2 | SYSTOLIC BLOOD PRESSURE: 97 MMHG

## 2023-07-05 DIAGNOSIS — I95.1 ORTHOSTASIS: ICD-10-CM

## 2023-07-05 DIAGNOSIS — I48.0 PAROXYSMAL ATRIAL FIBRILLATION (HCC): ICD-10-CM

## 2023-07-05 DIAGNOSIS — I10 ESSENTIAL (PRIMARY) HYPERTENSION: ICD-10-CM

## 2023-07-05 DIAGNOSIS — I77.810 THORACIC AORTIC ECTASIA (HCC): ICD-10-CM

## 2023-07-05 DIAGNOSIS — E66.01 SEVERE OBESITY (BMI 35.0-39.9) WITH COMORBIDITY (HCC): ICD-10-CM

## 2023-07-05 DIAGNOSIS — R42 DIZZINESS: Primary | ICD-10-CM

## 2023-07-05 DIAGNOSIS — I50.32 CHRONIC DIASTOLIC CONGESTIVE HEART FAILURE (HCC): ICD-10-CM

## 2023-07-05 PROCEDURE — 99214 OFFICE O/P EST MOD 30 MIN: CPT | Performed by: INTERNAL MEDICINE

## 2023-07-05 PROCEDURE — 3074F SYST BP LT 130 MM HG: CPT | Performed by: INTERNAL MEDICINE

## 2023-07-05 PROCEDURE — 1123F ACP DISCUSS/DSCN MKR DOCD: CPT | Performed by: INTERNAL MEDICINE

## 2023-07-05 PROCEDURE — 3078F DIAST BP <80 MM HG: CPT | Performed by: INTERNAL MEDICINE

## 2023-07-05 RX ORDER — FUROSEMIDE 20 MG/1
20 TABLET ORAL DAILY PRN
Qty: 90 TABLET | Refills: 1
Start: 2023-07-05

## 2023-07-05 RX ORDER — CEPHALEXIN 500 MG/1
500 CAPSULE ORAL 4 TIMES DAILY
COMMUNITY

## 2023-07-05 RX ORDER — BETAMETHASONE VALERATE 0.1 %
LOTION (ML) TOPICAL
COMMUNITY

## 2023-07-05 ASSESSMENT — PATIENT HEALTH QUESTIONNAIRE - PHQ9
SUM OF ALL RESPONSES TO PHQ QUESTIONS 1-9: 0
1. LITTLE INTEREST OR PLEASURE IN DOING THINGS: 0
2. FEELING DOWN, DEPRESSED OR HOPELESS: 0
SUM OF ALL RESPONSES TO PHQ QUESTIONS 1-9: 0
SUM OF ALL RESPONSES TO PHQ QUESTIONS 1-9: 0
DEPRESSION UNABLE TO ASSESS: FUNCTIONAL CAPACITY MOTIVATION LIMITS ACCURACY
SUM OF ALL RESPONSES TO PHQ9 QUESTIONS 1 & 2: 0
SUM OF ALL RESPONSES TO PHQ QUESTIONS 1-9: 0

## 2023-07-05 ASSESSMENT — ENCOUNTER SYMPTOMS
EYES NEGATIVE: 1
RESPIRATORY NEGATIVE: 1
GASTROINTESTINAL NEGATIVE: 1

## 2023-07-05 NOTE — PROGRESS NOTES
1. Have you been to the ER, urgent care clinic since your last visit? Hospitalized since your last visit? Yes, [facility]      2. Where do you normally have your labs drawn? [facility]    3. Do you need any refills today? No    4. Which local pharmacy do you use (enter pharmacy)? [facility]    5. Which mail order pharmacy do you use (enter pharmacy)? No     6. Are you here for surgical clearance and if so who will be doing your     procedure/surgery (care team)?    no

## 2023-07-05 NOTE — PROGRESS NOTES
Fred Blevins. is a 77y.o. year old male. Follow-up of paroxysmal atrial fibrillation, hypertension, MR, AI,    1/22 noted irregular heartbeat and BP monitor for the last 2 to 3 weeks. Feeling tired for last 2 to 3 weeks in addition to chest symptoms as below. 9/19 new patient just moved from [place]. Seen for preop clearance. Has chronic back problems and peripheral neuropathy. History of paroxysmal A. Fib. Last episode 2013 approximately per patient. 4/22 feeling significantly better since cardioversion. All cardiac symptoms resolved. Has good functional capacity as he can go up 2-3 flights of steps and can walk half to 1 mile on a level ground without stopping.  2/2020 He c/o palpitations with associated SOB and chest pain with flushed sensation. Episodes last about 20 minutes and resolve. He is anticipating right venous surgery in March.  6/2020 Denies palpitations, SOB or chest pain. 2/23 seen because of thyroid hormone not being normal by recent testing as well as recurrent palpitations and thinks that he is back in A-fib.  4/24/2023 status post event monitor which has revealed frequent PACs up to 2-3 beat grouped PACs as well but no A-fib. As sometimes these palpitations are associated with shortness of breath. Trace edema seen intermittently. 7/5/2023 palpitations as before. Has had dizziness intermittently for last few weeks. No CP/SOB. Review of Systems   Constitutional:  Positive for fatigue. HENT: Negative. Eyes: Negative. Respiratory: Negative. Cardiovascular:  Positive for palpitations and leg swelling. Gastrointestinal: Negative. Endocrine: Negative. Genitourinary: Negative. Musculoskeletal: Negative. Neurological: Negative. Psychiatric/Behavioral: Negative. All other systems reviewed and are negative. Physical Exam  Vitals and nursing note reviewed. Constitutional:       Appearance: Normal appearance. He is obese.    HENT:

## 2023-07-05 NOTE — PATIENT INSTRUCTIONS
The medication list included in this document is our record of what you are currently taking, including any changes that were made at today's visit. If you find any differences when compared to your medications at home, or have any questions that were not answered at your visit, please contact the office. Please drink extra water and reduce Lasix to 20 mg and do not take it if there is no significant swelling in both legs.

## 2023-07-11 DIAGNOSIS — I48.0 PAROXYSMAL ATRIAL FIBRILLATION (HCC): ICD-10-CM

## 2023-07-12 RX ORDER — AMIODARONE HYDROCHLORIDE 200 MG/1
TABLET ORAL
Qty: 90 TABLET | Refills: 0 | Status: SHIPPED | OUTPATIENT
Start: 2023-07-12

## 2023-07-12 RX ORDER — APIXABAN 5 MG/1
TABLET, FILM COATED ORAL
Qty: 60 TABLET | Refills: 0 | Status: SHIPPED | OUTPATIENT
Start: 2023-07-12

## 2023-09-25 DIAGNOSIS — Z91.89 AT RISK FOR AMIODARONE TOXICITY WITH LONG TERM USE: ICD-10-CM

## 2023-09-25 DIAGNOSIS — Z79.899 AT RISK FOR AMIODARONE TOXICITY WITH LONG TERM USE: ICD-10-CM

## 2023-09-25 DIAGNOSIS — I48.0 PAROXYSMAL ATRIAL FIBRILLATION (HCC): ICD-10-CM

## 2023-09-25 RX ORDER — LEVOTHYROXINE SODIUM 0.15 MG/1
150 TABLET ORAL DAILY
Qty: 90 TABLET | Refills: 1 | Status: SHIPPED | OUTPATIENT
Start: 2023-09-25

## 2023-10-10 RX ORDER — AMIODARONE HYDROCHLORIDE 200 MG/1
TABLET ORAL
Qty: 90 TABLET | Refills: 0 | Status: SHIPPED | OUTPATIENT
Start: 2023-10-10

## 2024-01-03 ENCOUNTER — OFFICE VISIT (OUTPATIENT)
Age: 69
End: 2024-01-03

## 2024-01-03 VITALS
DIASTOLIC BLOOD PRESSURE: 75 MMHG | HEART RATE: 85 BPM | SYSTOLIC BLOOD PRESSURE: 134 MMHG | OXYGEN SATURATION: 96 % | HEIGHT: 74 IN | WEIGHT: 279.2 LBS | BODY MASS INDEX: 35.83 KG/M2

## 2024-01-03 DIAGNOSIS — Z79.899 AT RISK FOR AMIODARONE TOXICITY WITH LONG TERM USE: ICD-10-CM

## 2024-01-03 DIAGNOSIS — Z91.89 AT RISK FOR AMIODARONE TOXICITY WITH LONG TERM USE: ICD-10-CM

## 2024-01-03 DIAGNOSIS — I48.0 PAROXYSMAL ATRIAL FIBRILLATION (HCC): Primary | ICD-10-CM

## 2024-01-03 DIAGNOSIS — I10 ESSENTIAL (PRIMARY) HYPERTENSION: ICD-10-CM

## 2024-01-03 DIAGNOSIS — I08.0 MITRAL AND AORTIC REGURGITATION: ICD-10-CM

## 2024-01-03 DIAGNOSIS — I50.32 CHRONIC DIASTOLIC CONGESTIVE HEART FAILURE (HCC): ICD-10-CM

## 2024-01-03 DIAGNOSIS — E66.01 SEVERE OBESITY (BMI 35.0-39.9) WITH COMORBIDITY (HCC): ICD-10-CM

## 2024-01-03 DIAGNOSIS — I77.810 THORACIC AORTIC ECTASIA (HCC): ICD-10-CM

## 2024-01-03 RX ORDER — BUPROPION HYDROCHLORIDE 300 MG/1
300 TABLET ORAL EVERY MORNING
COMMUNITY
Start: 2023-12-22

## 2024-01-03 ASSESSMENT — ENCOUNTER SYMPTOMS
RESPIRATORY NEGATIVE: 1
GASTROINTESTINAL NEGATIVE: 1
EYES NEGATIVE: 1

## 2024-01-03 NOTE — PROGRESS NOTES
suggested by Doppler findings.  · PV: Mild pulmonic valve regurgitation is present.  · AO: Aortic root is 4.2 cm, ascending aorta 4.2 cm.    Signed by: Helen Titus MD on 8/6/2021  3:48 PM, Signed by: Unknown Provider Result on 8/6/2021 12:00 AM    01/13/22    NM STRESS TEST WITH MYOCARDIAL PERFUSION 01/13/2022 12:08 PM, 01/13/2022 12:00 AM (Final)    Narrative  This is a summary report. The complete report is available in the patient's medical record. If you cannot access the medical record, please contact the sending organization for a detailed fax or copy.      Nuclear Findings: Normal left ventricular systolic function post-stress. LVEF measures 77%.    Nuclear Findings: Normal pharmacological myocardial perfusion study. LVEF measures 77%.    Nuclear Findings: LVEF measures 77%. LV perfusion is normal.    ECG: Resting ECG demonstrates atrial flutter.    ECG: Stress ECG was negative for ischemia.    Stress Test: A pharmacological stress test was performed using regadenoson. Hemodynamics are adequate for diagnosis. Blood pressure demonstrated a normal response and heart rate demonstrated a normal response to stress.    Signed by: Kiara Sandoval MD on 1/13/2022 12:08 PM, Signed by: Unknown Provider Result on 1/13/2022 12:00 AM    No results found for this or any previous visit.        Mr. Palmer has a reminder for a \"due or due soon\" health maintenance. I have asked that he contact his primary care provider for follow-up on this health maintenance.    1/2006 cardiac catheterization  CONCLUSION:      1. Normal left ventricular cavity size, wall motion ejection fraction.      2. Coronary artery irregularities without significant coronary atrial         stenoses.    11/04/19   ECHO ADULT COMPLETE 11/04/2019 11/4/2019    Narrative · Left Ventricle: Normal cavity size and systolic function (ejection   fraction normal). Mildly to moderately increased wall thickness. Estimated   left ventricular ejection fraction is

## 2024-01-04 ASSESSMENT — ENCOUNTER SYMPTOMS
CHEST TIGHTNESS: 0
WHEEZING: 0
BACK PAIN: 0
APNEA: 0
NAUSEA: 0
ABDOMINAL PAIN: 0
COUGH: 0
ABDOMINAL DISTENTION: 0
SHORTNESS OF BREATH: 0

## 2024-01-17 ENCOUNTER — OFFICE VISIT (OUTPATIENT)
Age: 69
End: 2024-01-17
Payer: COMMERCIAL

## 2024-01-17 ENCOUNTER — TELEPHONE (OUTPATIENT)
Age: 69
End: 2024-01-17

## 2024-01-17 VITALS
BODY MASS INDEX: 35.81 KG/M2 | OXYGEN SATURATION: 96 % | WEIGHT: 279 LBS | HEART RATE: 54 BPM | SYSTOLIC BLOOD PRESSURE: 113 MMHG | HEIGHT: 74 IN | DIASTOLIC BLOOD PRESSURE: 67 MMHG

## 2024-01-17 DIAGNOSIS — Z79.899 AT RISK FOR AMIODARONE TOXICITY WITH LONG TERM USE: ICD-10-CM

## 2024-01-17 DIAGNOSIS — I08.0 MITRAL AND AORTIC REGURGITATION: ICD-10-CM

## 2024-01-17 DIAGNOSIS — I48.0 PAROXYSMAL ATRIAL FIBRILLATION (HCC): ICD-10-CM

## 2024-01-17 DIAGNOSIS — E03.2 HYPOTHYROIDISM DUE TO MEDICATION: ICD-10-CM

## 2024-01-17 DIAGNOSIS — I50.32 CHRONIC DIASTOLIC CONGESTIVE HEART FAILURE (HCC): Primary | ICD-10-CM

## 2024-01-17 DIAGNOSIS — I77.810 THORACIC AORTIC ECTASIA (HCC): ICD-10-CM

## 2024-01-17 DIAGNOSIS — Z91.89 AT RISK FOR AMIODARONE TOXICITY WITH LONG TERM USE: ICD-10-CM

## 2024-01-17 DIAGNOSIS — I10 ESSENTIAL (PRIMARY) HYPERTENSION: ICD-10-CM

## 2024-01-17 PROCEDURE — 3078F DIAST BP <80 MM HG: CPT | Performed by: INTERNAL MEDICINE

## 2024-01-17 PROCEDURE — 3074F SYST BP LT 130 MM HG: CPT | Performed by: INTERNAL MEDICINE

## 2024-01-17 PROCEDURE — 99214 OFFICE O/P EST MOD 30 MIN: CPT | Performed by: INTERNAL MEDICINE

## 2024-01-17 PROCEDURE — 1123F ACP DISCUSS/DSCN MKR DOCD: CPT | Performed by: INTERNAL MEDICINE

## 2024-01-17 RX ORDER — LEVOTHYROXINE SODIUM 0.15 MG/1
150 TABLET ORAL DAILY
Qty: 90 TABLET | Refills: 1 | Status: SHIPPED | OUTPATIENT
Start: 2024-01-17

## 2024-01-17 RX ORDER — GABAPENTIN 300 MG/1
300 CAPSULE ORAL 3 TIMES DAILY
COMMUNITY
Start: 2024-01-16

## 2024-01-17 ASSESSMENT — PATIENT HEALTH QUESTIONNAIRE - PHQ9
DEPRESSION UNABLE TO ASSESS: FUNCTIONAL CAPACITY MOTIVATION LIMITS ACCURACY
2. FEELING DOWN, DEPRESSED OR HOPELESS: 0
SUM OF ALL RESPONSES TO PHQ QUESTIONS 1-9: 0

## 2024-01-17 ASSESSMENT — ENCOUNTER SYMPTOMS
WHEEZING: 0
EYES NEGATIVE: 1
APNEA: 0
RESPIRATORY NEGATIVE: 1
NAUSEA: 0
COUGH: 0
GASTROINTESTINAL NEGATIVE: 1
ABDOMINAL DISTENTION: 0
SHORTNESS OF BREATH: 0
ABDOMINAL PAIN: 0
CHEST TIGHTNESS: 0
BACK PAIN: 0

## 2024-01-17 NOTE — TELEPHONE ENCOUNTER
----- Message from Helen Titus MD sent at 1/16/2024  3:47 PM EST -----  Please contact patient and do the following asap    Check if compliant with thyroid medicine.  He is supposed to take Synthroid 150 mcg a day.  If he is compliant, increase the dose to 200 mcg a day.  Repeat TSH, free T4 in 6 weeks.  If he is not feeling well, refer to Dr. Bonilla of endocrinology.

## 2024-01-17 NOTE — PROGRESS NOTES
1. Have you been to the ER, urgent care clinic since your last visit?  Hospitalized since your last visit?     No      2.  Where do you normally have your labs drawn?   [facility]    3. Do you need any refills today?   No    4. Which local pharmacy do you use (enter pharmacy)?   [facility]    5. Which mail order pharmacy do you use (enter pharmacy)?   No     6. Are you here for surgical clearance and if so who will be doing your     procedure/surgery (care team)?   No

## 2024-01-17 NOTE — PROGRESS NOTES
Gelacio Palmer Jr. is a 68 y.o. year old male.      Follow-up of paroxysmal atrial fibrillation, hypertension, MR, AI,    1/22 noted irregular heartbeat and BP monitor for the last 2 to 3 weeks.  Feeling tired for last 2 to 3 weeks in addition to chest symptoms as below.  9/19 new patient just moved from [place].  Seen for preop clearance.  Has chronic back problems and peripheral neuropathy.    History of paroxysmal A. Fib.  Last episode 2013 approximately per patient.   4/22 feeling significantly better since cardioversion.  All cardiac symptoms resolved.  Has good functional capacity as he can go up 2-3 flights of steps and can walk half to 1 mile on a level ground without stopping.  2/2020 He c/o palpitations with associated SOB and chest pain with flushed sensation.  Episodes last about 20 minutes and resolve.  He is anticipating right venous surgery in March.  6/2020 Denies palpitations, SOB or chest pain.  2/23 seen because of thyroid hormone not being normal by recent testing as well as recurrent palpitations and thinks that he is back in A-fib.  4/24/2023 status post event monitor which has revealed frequent PACs up to 2-3 beat grouped PACs as well but no A-fib.  As sometimes these palpitations are associated with shortness of breath.  Trace edema seen intermittently.  7/5/2023 palpitations as before. Has had dizziness intermittently for last few weeks. No CP/SOB.  1/3/2024 Noted irregular heart beat after falling in 40 degree water, followed by several steep climbs uphill.  Patient noticed to feel fatigued x 2 days his blood pressure monitor noted irregular heart rate however according to the log rate was consistently 60s-70s.  Patient also complains with dizziness upon position change.  He denies chest pain or shortness of breath.  1/17/2024 has been taking Synthroid 75 mcg a day rather than 150 mcg a day that he used to take in the past.  Does not remember why was it reduced but thinks that I reduced it

## 2024-02-15 ENCOUNTER — TELEPHONE (OUTPATIENT)
Age: 69
End: 2024-02-15

## 2024-02-15 NOTE — TELEPHONE ENCOUNTER
Patient called and advised that he has been feeling several symptom including:  Chest pain, shortness of breath, dizziness, irregular heartbeat, struggling to breath (Pt advised he was on cpap which he has used for many years)    Pt advised he is currently experiencing chest pain and had growing concern re: condition-this RN advised Pt that he should report to emergency department (ED) for evaluation. Pt agreed and advised that he would report to nearest ED ([facility]).

## 2024-02-21 ENCOUNTER — TELEPHONE (OUTPATIENT)
Age: 69
End: 2024-02-21

## 2024-02-21 NOTE — TELEPHONE ENCOUNTER
Continue to monitor blood pressures recommend compression stockings will further address at follow-up

## 2024-02-21 NOTE — TELEPHONE ENCOUNTER
This RN contacted the patient at telephone number listed on file. Name and  verified with patient as identifiers.     Per written instruction from CARLYN Castillo, the following information was relayed to Pt:  Continue to monitor blood pressures   Recommend compression stockings  Will further address at follow-up     Pt expressed understanding and advised no other questions.

## 2024-02-21 NOTE — TELEPHONE ENCOUNTER
Pt went to emergency room on 02/15/2024 and was told orthostatic BP reflected hypotension--no medication change were made--Pt advised he continues to feel dizzy when changing positions and wants to know if medication changes should occur.    Medication reviewed with Pt.    Pt also booked for follow up appt.

## 2024-02-28 ENCOUNTER — OFFICE VISIT (OUTPATIENT)
Age: 69
End: 2024-02-28
Payer: COMMERCIAL

## 2024-02-28 VITALS
DIASTOLIC BLOOD PRESSURE: 78 MMHG | HEART RATE: 65 BPM | SYSTOLIC BLOOD PRESSURE: 128 MMHG | OXYGEN SATURATION: 97 % | BODY MASS INDEX: 35.31 KG/M2 | HEIGHT: 75 IN | WEIGHT: 284 LBS

## 2024-02-28 DIAGNOSIS — I08.0 MITRAL AND AORTIC REGURGITATION: ICD-10-CM

## 2024-02-28 DIAGNOSIS — I48.0 PAROXYSMAL ATRIAL FIBRILLATION (HCC): Primary | ICD-10-CM

## 2024-02-28 DIAGNOSIS — Z91.89 AT RISK FOR AMIODARONE TOXICITY WITH LONG TERM USE: ICD-10-CM

## 2024-02-28 DIAGNOSIS — E66.01 MORBID (SEVERE) OBESITY DUE TO EXCESS CALORIES (HCC): ICD-10-CM

## 2024-02-28 DIAGNOSIS — I77.810 THORACIC AORTIC ECTASIA (HCC): ICD-10-CM

## 2024-02-28 DIAGNOSIS — I50.32 CHRONIC DIASTOLIC CONGESTIVE HEART FAILURE (HCC): ICD-10-CM

## 2024-02-28 DIAGNOSIS — Z79.899 AT RISK FOR AMIODARONE TOXICITY WITH LONG TERM USE: ICD-10-CM

## 2024-02-28 DIAGNOSIS — I10 ESSENTIAL (PRIMARY) HYPERTENSION: ICD-10-CM

## 2024-02-28 DIAGNOSIS — Z09 HOSPITAL DISCHARGE FOLLOW-UP: ICD-10-CM

## 2024-02-28 PROCEDURE — 99214 OFFICE O/P EST MOD 30 MIN: CPT | Performed by: NURSE PRACTITIONER

## 2024-02-28 PROCEDURE — 3074F SYST BP LT 130 MM HG: CPT | Performed by: NURSE PRACTITIONER

## 2024-02-28 PROCEDURE — 1123F ACP DISCUSS/DSCN MKR DOCD: CPT | Performed by: NURSE PRACTITIONER

## 2024-02-28 PROCEDURE — 1111F DSCHRG MED/CURRENT MED MERGE: CPT | Performed by: NURSE PRACTITIONER

## 2024-02-28 PROCEDURE — 93000 ELECTROCARDIOGRAM COMPLETE: CPT | Performed by: NURSE PRACTITIONER

## 2024-02-28 PROCEDURE — 3078F DIAST BP <80 MM HG: CPT | Performed by: NURSE PRACTITIONER

## 2024-02-28 RX ORDER — AMIODARONE HYDROCHLORIDE 200 MG/1
200 TABLET ORAL DAILY
Qty: 30 TABLET | Refills: 6 | Status: SHIPPED | OUTPATIENT
Start: 2024-02-28

## 2024-02-28 ASSESSMENT — ENCOUNTER SYMPTOMS
ABDOMINAL PAIN: 0
GASTROINTESTINAL NEGATIVE: 1
RESPIRATORY NEGATIVE: 1
APNEA: 0
SHORTNESS OF BREATH: 0
WHEEZING: 0
EYES NEGATIVE: 1
BACK PAIN: 0
CHEST TIGHTNESS: 0
ABDOMINAL DISTENTION: 0
NAUSEA: 0
COUGH: 0

## 2024-02-28 NOTE — PROGRESS NOTES
diet guidelines.    4/24/2023 A-fib is staying in sinus rhythm with PACs the patient continues to have palpitations they are likely because of PACs.  Blood pressure is reducing showing downtrend even at home.  Reduce lisinopril and continue with intermittent checks at home.  Follow-up thyroid and liver function specially as Synthroid dose was increased 2 months ago.  Check an echo to follow-up on aortic ectasia.  Lipids are acceptable.    7/5/2023 staying in reg rhythm.  Dizziness is likely secondary to volume depletion as he takes Lasix every day and his edema in the legs is unilateral mostly.  Blood pressure is low normal.  Reduce Lasix to 20 mg and that to only as needed.  CHF is compensated NYHA class II.  Diet weight and exercise reinforced.  1/2024 patient was concerned had gone back into A-fib after falling in water.  And irregular heart rhythm detected on blood pressure cuff.  In office EKG sinus rhythm.  Orthostatic blood pressures are stable increase in blood pressure and heart rate from supine to standing.  Discussed patient has history of sinus arrhythmia also history of PVCs and PACs which could be the regular rhythm detected by blood pressure cuff.  If symptoms become more frequent we will consider repeat event monitor.  Advised to have blood work drawn for amiodarone surveillance as previously ordered.    Gelacio was seen today for follow-up from hospital.    Diagnoses and all orders for this visit:    Paroxysmal atrial fibrillation (HCC)  -     EKG 12 Lead  -     Cardiac event monitor; Future  -     TSH + Free T4 Panel; Future    Hospital discharge follow-up  -     MS DISCHARGE MEDS RECONCILED W/ CURRENT OUTPATIENT MED LIST    At risk for amiodarone toxicity with long term use    Chronic diastolic congestive heart failure (HCC)    Thoracic aortic ectasia (HCC)    Mitral and aortic regurgitation    Essential (primary) hypertension    Morbid (severe) obesity due to excess calories (HCC)    Other

## 2024-04-02 ENCOUNTER — OFFICE VISIT (OUTPATIENT)
Age: 69
End: 2024-04-02
Payer: COMMERCIAL

## 2024-04-02 VITALS
BODY MASS INDEX: 35.25 KG/M2 | HEART RATE: 50 BPM | SYSTOLIC BLOOD PRESSURE: 137 MMHG | DIASTOLIC BLOOD PRESSURE: 75 MMHG | OXYGEN SATURATION: 97 % | WEIGHT: 282 LBS

## 2024-04-02 DIAGNOSIS — I77.810 THORACIC AORTIC ECTASIA (HCC): ICD-10-CM

## 2024-04-02 DIAGNOSIS — Z79.899 AT RISK FOR AMIODARONE TOXICITY WITH LONG TERM USE: ICD-10-CM

## 2024-04-02 DIAGNOSIS — Z91.89 AT RISK FOR AMIODARONE TOXICITY WITH LONG TERM USE: ICD-10-CM

## 2024-04-02 DIAGNOSIS — E03.2 HYPOTHYROIDISM DUE TO MEDICATION: ICD-10-CM

## 2024-04-02 DIAGNOSIS — I10 ESSENTIAL (PRIMARY) HYPERTENSION: ICD-10-CM

## 2024-04-02 DIAGNOSIS — N52.9 ERECTILE DYSFUNCTION, UNSPECIFIED ERECTILE DYSFUNCTION TYPE: ICD-10-CM

## 2024-04-02 DIAGNOSIS — I35.1 NONRHEUMATIC AORTIC VALVE INSUFFICIENCY: ICD-10-CM

## 2024-04-02 DIAGNOSIS — I48.0 PAROXYSMAL ATRIAL FIBRILLATION (HCC): Primary | ICD-10-CM

## 2024-04-02 DIAGNOSIS — R42 DIZZINESS: ICD-10-CM

## 2024-04-02 PROCEDURE — 3075F SYST BP GE 130 - 139MM HG: CPT | Performed by: INTERNAL MEDICINE

## 2024-04-02 PROCEDURE — 99214 OFFICE O/P EST MOD 30 MIN: CPT | Performed by: INTERNAL MEDICINE

## 2024-04-02 PROCEDURE — 3078F DIAST BP <80 MM HG: CPT | Performed by: INTERNAL MEDICINE

## 2024-04-02 PROCEDURE — 1123F ACP DISCUSS/DSCN MKR DOCD: CPT | Performed by: INTERNAL MEDICINE

## 2024-04-02 RX ORDER — KETOCONAZOLE 20 MG/G
CREAM TOPICAL
COMMUNITY
Start: 2023-09-28

## 2024-04-02 RX ORDER — SILDENAFIL 50 MG/1
50 TABLET, FILM COATED ORAL PRN
Qty: 30 TABLET | Refills: 0 | Status: SHIPPED | OUTPATIENT
Start: 2024-04-02

## 2024-04-02 RX ORDER — AMLODIPINE BESYLATE 2.5 MG/1
2.5 TABLET ORAL DAILY
Qty: 90 TABLET | Refills: 3 | Status: SHIPPED | OUTPATIENT
Start: 2024-04-02

## 2024-04-02 RX ORDER — LEVOTHYROXINE SODIUM 175 UG/1
175 TABLET ORAL DAILY
Qty: 90 TABLET | Refills: 3 | Status: SHIPPED | OUTPATIENT
Start: 2024-04-02

## 2024-04-02 ASSESSMENT — ENCOUNTER SYMPTOMS
APNEA: 0
NAUSEA: 0
ABDOMINAL DISTENTION: 0
RESPIRATORY NEGATIVE: 1
SHORTNESS OF BREATH: 0
GASTROINTESTINAL NEGATIVE: 1
CHEST TIGHTNESS: 0
ABDOMINAL PAIN: 0
EYES NEGATIVE: 1
WHEEZING: 0
BACK PAIN: 0

## 2024-04-02 NOTE — PROGRESS NOTES
1. Have you been to the ER, urgent care clinic since your last visit?  Hospitalized since your last visit? NO    2.  Where do you normally have your labs drawn?  [facility]    3. Do you need any refills today?  NO    4. Which local pharmacy do you use (enter pharmacy)?   [facility]     5. Which mail order pharmacy do you use (enter pharmacy)?   [facility]     6. Are you here for surgical clearance and if so who will be doing your     procedure/surgery (care team)?  PATIENT IS TENTATIVELY SCHEDULED FOR MOHS PROCEDURE WITH / YUE WITH EVMS ~  4/8/24, 4/15/24 4/22/24, FOLLOWED BY 4/29/24 ~ SUTURE REMOVAL        
eyelid which should probably wait till the results of the event monitor as that is not urgent.  Weight loss recommended and reinforced again.  They already have Mediterranean diet guidelines.    4/24/2023 A-fib is staying in sinus rhythm with PACs the patient continues to have palpitations they are likely because of PACs.  Blood pressure is reducing showing downtrend even at home.  Reduce lisinopril and continue with intermittent checks at home.  Follow-up thyroid and liver function specially as Synthroid dose was increased 2 months ago.  Check an echo to follow-up on aortic ectasia.  Lipids are acceptable.    7/5/2023 staying in reg rhythm.  Dizziness is likely secondary to volume depletion as he takes Lasix every day and his edema in the legs is unilateral mostly.  Blood pressure is low normal.  Reduce Lasix to 20 mg and that to only as needed.  CHF is compensated NYHA class II.  Diet weight and exercise reinforced.  1/2024 patient was concerned had gone back into A-fib after falling in water.  And irregular heart rhythm detected on blood pressure cuff.  In office EKG sinus rhythm.  Orthostatic blood pressures are stable increase in blood pressure and heart rate from supine to standing.  Discussed patient has history of sinus arrhythmia also history of PVCs and PACs which could be the regular rhythm detected by blood pressure cuff.  If symptoms become more frequent we will consider repeat event monitor.  Advised to have blood work drawn for amiodarone surveillance as previously ordered.    1/17/2024  Plan for medicines : TSH is severely elevated likely secondary to reduced Synthroid intake by misunderstanding.  Represcribe Synthroid 150 mcg a day and follow the labs.  CHF is compensated.  Blood pressure is controlled well.  Continue amiodarone Eliquis Lasix lisinopril.  Exercise Plan: Walk 30 to 40 minutes a day try to walk 30 to 40 minutes a day   Diet plan: Mediterranean diet guidelines reinforced.  2/2024  Seen

## 2024-08-05 DIAGNOSIS — I48.0 PAROXYSMAL ATRIAL FIBRILLATION (HCC): ICD-10-CM

## 2024-08-05 DIAGNOSIS — Z79.899 AT RISK FOR AMIODARONE TOXICITY WITH LONG TERM USE: ICD-10-CM

## 2024-08-05 DIAGNOSIS — Z91.89 AT RISK FOR AMIODARONE TOXICITY WITH LONG TERM USE: ICD-10-CM

## 2024-08-05 NOTE — TELEPHONE ENCOUNTER
Spoke with patient and he stated you told him to get labs done before next appointment. He will try and get it done this week.

## 2024-08-08 ENCOUNTER — TELEPHONE (OUTPATIENT)
Age: 69
End: 2024-08-08

## 2024-08-08 DIAGNOSIS — Z91.89 AT RISK FOR AMIODARONE TOXICITY WITH LONG TERM USE: Primary | ICD-10-CM

## 2024-08-08 DIAGNOSIS — Z79.899 OTHER LONG TERM (CURRENT) DRUG THERAPY: ICD-10-CM

## 2024-08-08 DIAGNOSIS — Z79.899 AT RISK FOR AMIODARONE TOXICITY WITH LONG TERM USE: Primary | ICD-10-CM

## 2024-08-08 RX ORDER — LEVOTHYROXINE SODIUM 0.2 MG/1
200 TABLET ORAL DAILY
Qty: 90 TABLET | Refills: 1 | Status: SHIPPED | OUTPATIENT
Start: 2024-08-08

## 2024-08-08 NOTE — TELEPHONE ENCOUNTER
----- Message from Helen Titus MD sent at 8/8/2024  2:18 PM EDT -----  Please contact patient and do the following asap    Change levothyroxine to 200 mcg a day  Get TSH, free T4 and free T3 in 2 months

## 2024-08-08 NOTE — TELEPHONE ENCOUNTER
Spoke to patient per Dr. Titus regarding lab/test. Lab reviewed. Please contact patient and do the following asap     Change levothyroxine to 200 mcg a day  Get TSH, free T4 and free T3 in 2 months. He voices understanding and acceptance of this advice and will call back if any further questions or concerns.

## 2024-08-14 ENCOUNTER — TELEPHONE (OUTPATIENT)
Age: 69
End: 2024-08-14

## 2024-08-14 NOTE — TELEPHONE ENCOUNTER
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 Please advise if patient need to continue hold eliquis or resume and stop again before Monday.

## 2024-08-15 NOTE — TELEPHONE ENCOUNTER
Spoke to patient wife per Lucia Castillo NP. Recommend to resume and hold starting Saturday. She voices understanding and acceptance of this advice and will call back if any further questions or concerns.

## 2024-08-31 DIAGNOSIS — Z79.899 AT RISK FOR AMIODARONE TOXICITY WITH LONG TERM USE: ICD-10-CM

## 2024-08-31 DIAGNOSIS — Z91.89 AT RISK FOR AMIODARONE TOXICITY WITH LONG TERM USE: ICD-10-CM

## 2024-08-31 DIAGNOSIS — I48.0 PAROXYSMAL ATRIAL FIBRILLATION (HCC): ICD-10-CM

## 2024-09-03 RX ORDER — LEVOTHYROXINE SODIUM 150 UG/1
150 TABLET ORAL DAILY
Qty: 90 TABLET | Refills: 3 | Status: SHIPPED | OUTPATIENT
Start: 2024-09-03

## 2024-09-09 ENCOUNTER — TELEPHONE (OUTPATIENT)
Age: 69
End: 2024-09-09

## 2024-09-18 RX ORDER — LISINOPRIL 20 MG/1
20 TABLET ORAL DAILY
Qty: 90 TABLET | Refills: 2 | Status: SHIPPED | OUTPATIENT
Start: 2024-09-18

## 2024-10-07 RX ORDER — AMIODARONE HYDROCHLORIDE 200 MG/1
200 TABLET ORAL DAILY
Qty: 90 TABLET | Refills: 2 | Status: SHIPPED | OUTPATIENT
Start: 2024-10-07

## 2024-10-15 ENCOUNTER — OFFICE VISIT (OUTPATIENT)
Age: 69
End: 2024-10-15
Payer: MEDICARE

## 2024-10-15 VITALS
BODY MASS INDEX: 35.61 KG/M2 | WEIGHT: 286.4 LBS | DIASTOLIC BLOOD PRESSURE: 73 MMHG | OXYGEN SATURATION: 95 % | HEIGHT: 75 IN | HEART RATE: 45 BPM | SYSTOLIC BLOOD PRESSURE: 129 MMHG

## 2024-10-15 DIAGNOSIS — E66.01 SEVERE OBESITY (BMI 35.0-39.9) WITH COMORBIDITY: ICD-10-CM

## 2024-10-15 DIAGNOSIS — I77.810 THORACIC AORTIC ECTASIA (HCC): ICD-10-CM

## 2024-10-15 DIAGNOSIS — N52.9 ERECTILE DYSFUNCTION, UNSPECIFIED ERECTILE DYSFUNCTION TYPE: ICD-10-CM

## 2024-10-15 DIAGNOSIS — I10 ESSENTIAL (PRIMARY) HYPERTENSION: Primary | ICD-10-CM

## 2024-10-15 DIAGNOSIS — I50.32 CHRONIC DIASTOLIC CONGESTIVE HEART FAILURE (HCC): ICD-10-CM

## 2024-10-15 DIAGNOSIS — Z79.899 AT RISK FOR AMIODARONE TOXICITY WITH LONG TERM USE: ICD-10-CM

## 2024-10-15 DIAGNOSIS — Z79.01 LONG TERM (CURRENT) USE OF ANTICOAGULANTS: ICD-10-CM

## 2024-10-15 DIAGNOSIS — I48.0 PAROXYSMAL ATRIAL FIBRILLATION (HCC): ICD-10-CM

## 2024-10-15 DIAGNOSIS — Z91.89 AT RISK FOR AMIODARONE TOXICITY WITH LONG TERM USE: ICD-10-CM

## 2024-10-15 PROBLEM — I50.31 ACUTE DIASTOLIC CONGESTIVE HEART FAILURE (HCC): Status: RESOLVED | Noted: 2023-02-27 | Resolved: 2024-10-15

## 2024-10-15 PROCEDURE — 3017F COLORECTAL CA SCREEN DOC REV: CPT | Performed by: INTERNAL MEDICINE

## 2024-10-15 PROCEDURE — 3078F DIAST BP <80 MM HG: CPT | Performed by: INTERNAL MEDICINE

## 2024-10-15 PROCEDURE — 3074F SYST BP LT 130 MM HG: CPT | Performed by: INTERNAL MEDICINE

## 2024-10-15 PROCEDURE — 99214 OFFICE O/P EST MOD 30 MIN: CPT | Performed by: INTERNAL MEDICINE

## 2024-10-15 PROCEDURE — G8484 FLU IMMUNIZE NO ADMIN: HCPCS | Performed by: INTERNAL MEDICINE

## 2024-10-15 PROCEDURE — 1036F TOBACCO NON-USER: CPT | Performed by: INTERNAL MEDICINE

## 2024-10-15 PROCEDURE — G8417 CALC BMI ABV UP PARAM F/U: HCPCS | Performed by: INTERNAL MEDICINE

## 2024-10-15 PROCEDURE — 1123F ACP DISCUSS/DSCN MKR DOCD: CPT | Performed by: INTERNAL MEDICINE

## 2024-10-15 PROCEDURE — G8427 DOCREV CUR MEDS BY ELIG CLIN: HCPCS | Performed by: INTERNAL MEDICINE

## 2024-10-15 RX ORDER — TADALAFIL 10 MG/1
10 TABLET ORAL DAILY PRN
Qty: 10 TABLET | Refills: 5 | Status: SHIPPED | OUTPATIENT
Start: 2024-10-15

## 2024-10-15 RX ORDER — FUROSEMIDE 20 MG
40 TABLET ORAL DAILY PRN
Qty: 90 TABLET | Refills: 2 | Status: SHIPPED | OUTPATIENT
Start: 2024-10-15

## 2024-10-15 RX ORDER — OXYCODONE AND ACETAMINOPHEN 7.5; 325 MG/1; MG/1
1 TABLET ORAL EVERY 4 HOURS PRN
COMMUNITY

## 2024-10-15 ASSESSMENT — ENCOUNTER SYMPTOMS
NAUSEA: 0
APNEA: 0
RESPIRATORY NEGATIVE: 1
CHEST TIGHTNESS: 0
SHORTNESS OF BREATH: 0
EYES NEGATIVE: 1
WHEEZING: 0
ABDOMINAL PAIN: 0
ABDOMINAL DISTENTION: 0
BACK PAIN: 0
COUGH: 0
GASTROINTESTINAL NEGATIVE: 1

## 2024-10-15 ASSESSMENT — PATIENT HEALTH QUESTIONNAIRE - PHQ9
2. FEELING DOWN, DEPRESSED OR HOPELESS: NOT AT ALL
SUM OF ALL RESPONSES TO PHQ QUESTIONS 1-9: 0
1. LITTLE INTEREST OR PLEASURE IN DOING THINGS: NOT AT ALL
SUM OF ALL RESPONSES TO PHQ QUESTIONS 1-9: 0
SUM OF ALL RESPONSES TO PHQ9 QUESTIONS 1 & 2: 0
SUM OF ALL RESPONSES TO PHQ QUESTIONS 1-9: 0
SUM OF ALL RESPONSES TO PHQ QUESTIONS 1-9: 0

## 2024-10-15 NOTE — PROGRESS NOTES
Room 5      1. Have you been to the ER, urgent care clinic since your last visit?  Hospitalized since your last visit? No      2.  Where do you normally have your labs drawn?  [facility]      3. Do you need any refills today?        4. Which local pharmacy do you use?   [facility]      5. Which mail order pharmacy do you use?   None       6. Are you here for surgical clearance? No,  who will be doing your procedure/surgery (care team)?   N/A  
(Final)    Narrative  This is a summary report. The complete report is available in the patient's medical record. If you cannot access the medical record, please contact the sending organization for a detailed fax or copy.    · LV: Estimated LVEF is 60 - 65%. Normal cavity size and systolic function (ejection fraction normal). Mild concentric hypertrophy. Wall motion: normal. Moderate (grade 2) left ventricular diastolic dysfunction.  · LA: Left Atrium volume index is 27.92 mL/m2.  · RA: Mildly dilated right atrium.  · AV: Mild aortic valve regurgitation is present.  · MV: Mild mitral valve regurgitation is present.  · TV: Mild tricuspid valve regurgitation is present. Right Ventricular Arterial Pressure (RVSP) is 36 mmHg. Pulmonary hypertension not suggested by Doppler findings.  · PV: Mild pulmonic valve regurgitation is present.  · AO: Aortic root is 4.2 cm, ascending aorta 4.2 cm.    Signed by: Helen Titus MD on 8/6/2021  3:48 PM, Signed by: Unknown Provider Result on 8/6/2021 12:00 AM    01/13/22    NM STRESS TEST WITH MYOCARDIAL PERFUSION 01/13/2022 12:08 PM, 01/13/2022 12:00 AM (Final)    Narrative  This is a summary report. The complete report is available in the patient's medical record. If you cannot access the medical record, please contact the sending organization for a detailed fax or copy.      Nuclear Findings: Normal left ventricular systolic function post-stress. LVEF measures 77%.    Nuclear Findings: Normal pharmacological myocardial perfusion study. LVEF measures 77%.    Nuclear Findings: LVEF measures 77%. LV perfusion is normal.    ECG: Resting ECG demonstrates atrial flutter.    ECG: Stress ECG was negative for ischemia.    Stress Test: A pharmacological stress test was performed using regadenoson. Hemodynamics are adequate for diagnosis. Blood pressure demonstrated a normal response and heart rate demonstrated a normal response to stress.    Signed by: Kiara Sandoval MD on 1/13/2022

## 2025-01-04 DIAGNOSIS — I48.0 PAROXYSMAL ATRIAL FIBRILLATION (HCC): ICD-10-CM

## 2025-01-06 RX ORDER — APIXABAN 5 MG/1
5 TABLET, FILM COATED ORAL 2 TIMES DAILY
Qty: 60 TABLET | Refills: 0 | Status: SHIPPED | OUTPATIENT
Start: 2025-01-06

## 2025-02-03 DIAGNOSIS — I48.0 PAROXYSMAL ATRIAL FIBRILLATION (HCC): ICD-10-CM

## 2025-02-03 RX ORDER — APIXABAN 5 MG/1
5 TABLET, FILM COATED ORAL 2 TIMES DAILY
Qty: 60 TABLET | Refills: 0 | Status: SHIPPED | OUTPATIENT
Start: 2025-02-03

## 2025-02-14 DIAGNOSIS — I10 ESSENTIAL (PRIMARY) HYPERTENSION: ICD-10-CM

## 2025-02-14 DIAGNOSIS — R42 DIZZINESS: ICD-10-CM

## 2025-02-14 RX ORDER — AMLODIPINE BESYLATE 10 MG/1
10 TABLET ORAL DAILY
Qty: 90 TABLET | Refills: 1 | Status: SHIPPED | OUTPATIENT
Start: 2025-02-14

## 2025-03-01 DIAGNOSIS — I48.0 PAROXYSMAL ATRIAL FIBRILLATION (HCC): ICD-10-CM

## 2025-03-03 RX ORDER — APIXABAN 5 MG/1
5 TABLET, FILM COATED ORAL 2 TIMES DAILY
Qty: 60 TABLET | Refills: 0 | Status: SHIPPED | OUTPATIENT
Start: 2025-03-03

## 2025-04-01 DIAGNOSIS — I48.0 PAROXYSMAL ATRIAL FIBRILLATION (HCC): ICD-10-CM

## 2025-04-01 RX ORDER — APIXABAN 5 MG/1
5 TABLET, FILM COATED ORAL 2 TIMES DAILY
Qty: 60 TABLET | Refills: 6 | Status: SHIPPED | OUTPATIENT
Start: 2025-04-01

## 2025-06-11 NOTE — PROGRESS NOTES
1. Have you been to an emergency room or urgent care clinic since your last visit? NO    Hospitalized since your last visit? If yes, where, when, and reason for visit? NO  2. Have you seen or consulted any other health care providers outside of the [facility] since your last visit including any procedures, health maintenance items. If yes, where, when and reason for visit?  NO No, psychiatric follow up needed - call with questions...

## 2025-07-11 RX ORDER — AMIODARONE HYDROCHLORIDE 200 MG/1
200 TABLET ORAL DAILY
Qty: 90 TABLET | Refills: 0 | Status: SHIPPED | OUTPATIENT
Start: 2025-07-11

## 2025-07-17 ENCOUNTER — OFFICE VISIT (OUTPATIENT)
Age: 70
End: 2025-07-17
Payer: MEDICARE

## 2025-07-17 VITALS
SYSTOLIC BLOOD PRESSURE: 121 MMHG | HEIGHT: 74 IN | WEIGHT: 265 LBS | HEART RATE: 48 BPM | DIASTOLIC BLOOD PRESSURE: 61 MMHG | BODY MASS INDEX: 34.01 KG/M2

## 2025-07-17 DIAGNOSIS — I77.810 THORACIC AORTIC ECTASIA: ICD-10-CM

## 2025-07-17 DIAGNOSIS — I48.0 PAROXYSMAL ATRIAL FIBRILLATION (HCC): Primary | ICD-10-CM

## 2025-07-17 DIAGNOSIS — Z79.01 LONG TERM (CURRENT) USE OF ANTICOAGULANTS: ICD-10-CM

## 2025-07-17 DIAGNOSIS — I10 ESSENTIAL (PRIMARY) HYPERTENSION: ICD-10-CM

## 2025-07-17 DIAGNOSIS — I50.32 CHRONIC DIASTOLIC CONGESTIVE HEART FAILURE (HCC): ICD-10-CM

## 2025-07-17 DIAGNOSIS — R07.9 CHEST PAIN, UNSPECIFIED TYPE: ICD-10-CM

## 2025-07-17 DIAGNOSIS — I08.0 MITRAL AND AORTIC REGURGITATION: ICD-10-CM

## 2025-07-17 PROCEDURE — G8417 CALC BMI ABV UP PARAM F/U: HCPCS | Performed by: NURSE PRACTITIONER

## 2025-07-17 PROCEDURE — 93000 ELECTROCARDIOGRAM COMPLETE: CPT | Performed by: NURSE PRACTITIONER

## 2025-07-17 PROCEDURE — 3074F SYST BP LT 130 MM HG: CPT | Performed by: NURSE PRACTITIONER

## 2025-07-17 PROCEDURE — 99214 OFFICE O/P EST MOD 30 MIN: CPT | Performed by: NURSE PRACTITIONER

## 2025-07-17 PROCEDURE — 1123F ACP DISCUSS/DSCN MKR DOCD: CPT | Performed by: NURSE PRACTITIONER

## 2025-07-17 PROCEDURE — 1036F TOBACCO NON-USER: CPT | Performed by: NURSE PRACTITIONER

## 2025-07-17 PROCEDURE — 3017F COLORECTAL CA SCREEN DOC REV: CPT | Performed by: NURSE PRACTITIONER

## 2025-07-17 PROCEDURE — 1159F MED LIST DOCD IN RCRD: CPT | Performed by: NURSE PRACTITIONER

## 2025-07-17 PROCEDURE — G8427 DOCREV CUR MEDS BY ELIG CLIN: HCPCS | Performed by: NURSE PRACTITIONER

## 2025-07-17 PROCEDURE — 3078F DIAST BP <80 MM HG: CPT | Performed by: NURSE PRACTITIONER

## 2025-07-17 PROCEDURE — 1125F AMNT PAIN NOTED PAIN PRSNT: CPT | Performed by: NURSE PRACTITIONER

## 2025-07-17 RX ORDER — FUROSEMIDE 20 MG/1
40 TABLET ORAL DAILY PRN
Qty: 180 TABLET | Refills: 2 | Status: SHIPPED | OUTPATIENT
Start: 2025-07-17

## 2025-07-17 RX ORDER — POLYMYXIN B SULFATE AND TRIMETHOPRIM 1; 10000 MG/ML; [USP'U]/ML
1 SOLUTION OPHTHALMIC EVERY 4 HOURS
COMMUNITY

## 2025-07-17 RX ORDER — HYDROCORTISONE 25 MG/ML
LOTION TOPICAL 2 TIMES DAILY
COMMUNITY

## 2025-07-17 RX ORDER — CLOBETASOL PROPIONATE 0.05 G/ML
SPRAY TOPICAL
COMMUNITY

## 2025-07-17 ASSESSMENT — PATIENT HEALTH QUESTIONNAIRE - PHQ9
1. LITTLE INTEREST OR PLEASURE IN DOING THINGS: NOT AT ALL
SUM OF ALL RESPONSES TO PHQ QUESTIONS 1-9: 0
SUM OF ALL RESPONSES TO PHQ QUESTIONS 1-9: 0
2. FEELING DOWN, DEPRESSED OR HOPELESS: NOT AT ALL
SUM OF ALL RESPONSES TO PHQ QUESTIONS 1-9: 0
SUM OF ALL RESPONSES TO PHQ QUESTIONS 1-9: 0

## 2025-07-17 ASSESSMENT — ENCOUNTER SYMPTOMS
WHEEZING: 0
BACK PAIN: 0
COUGH: 0
SHORTNESS OF BREATH: 0
ABDOMINAL PAIN: 0
NAUSEA: 0
GASTROINTESTINAL NEGATIVE: 1
RESPIRATORY NEGATIVE: 1
EYES NEGATIVE: 1
ABDOMINAL DISTENTION: 0
APNEA: 0
CHEST TIGHTNESS: 0

## 2025-07-17 NOTE — PROGRESS NOTES
1. Have you been to the ER, urgent care clinic since your last visit?  Hospitalized since your last visit?No    2. Have you seen or consulted any other health care providers outside of the [facility] since your last visit?  Include any pap smears or colon screening. No    
  Component Value Date    CHOL 146 10/13/2021     Lab Results   Component Value Date    TRIG 72 10/13/2021     Lab Results   Component Value Date    HDL 45 10/13/2021     Lab Results   Component Value Date    LDL 87 10/13/2021     No results found for: \"VLDL\"    Pertinent laboratory and test data reviewed and discussed with patient.      History of paroxysmal A. Fib.  Last episode 2013 approximately per patient.    Mitral and aortic regurgitation: Mild 10/19; 8/21;   Aortic ectasia: 11/19 aortic root 3.9, ASC ao 4.0; 8/21 ao root 4.2, ASC ao 4.2; 5/23 ao root 4.2, ASC ao 4.3; 10/24 ao root 4.2;  Cardioversion: 3/22 successful  History of amiodarone: Given for A. Fib    TSH: Normal 3/22; high 2/23; normal; 2/24 high (9.4); 4/24 high (8.7); high (6.5) 9/24   LFTs: Normal 3/22; 5/23; 9/23; 2/24; 8/24   DLCO: 3/22 95%;    2/2020  Continues to c/o palpitations with SOB, dizziness and flushed sensation.  These occur daily and last 5-20 minutes.  Will increase propafenone to 300 mg/ TID.  Check EKG in 1 week.  If palpitations continue, will evaluate for arrhythmias with Holter monitor.  To resume lasix and compression stockings for edema.  F/U with vascular as scheduled.  Advised to wear CPAP consistently, this may improve SOB and palpitations. EKG - SR mild increase in QRS.  Repeat EKG in 1 week.    6/2020  Reports palpitations have resolved with increased dose of prpafenone to 300 mg / TID.  EKG with  - will monitor and repeat EKG in 3 months.  He has lost 23 pounds since LOV and was congratulated.  Diet and exercise encouraged to promote further weight loss.   Continues to have edema to RLE.  He is to have venous closure to saphenous vein of right leg.    7/20 patient complains of mild dizziness which is new and had orthostatic changes in PCPs office.  It is likely secondary to volume depletion and weight loss.  Will discontinue diuretics and follow home BP chart.  Continue other medications but if the BP stays

## 2025-07-24 ENCOUNTER — HOSPITAL ENCOUNTER (OUTPATIENT)
Facility: HOSPITAL | Age: 70
Discharge: HOME | End: 2025-07-27
Payer: MEDICARE

## 2025-07-24 DIAGNOSIS — I10 ESSENTIAL (PRIMARY) HYPERTENSION: ICD-10-CM

## 2025-07-24 DIAGNOSIS — Z79.01 LONG TERM (CURRENT) USE OF ANTICOAGULANTS: ICD-10-CM

## 2025-07-24 DIAGNOSIS — I77.810 THORACIC AORTIC ECTASIA: ICD-10-CM

## 2025-07-24 DIAGNOSIS — I48.0 PAROXYSMAL ATRIAL FIBRILLATION (HCC): ICD-10-CM

## 2025-07-24 PROCEDURE — 94060 EVALUATION OF WHEEZING: CPT

## 2025-07-24 PROCEDURE — 94726 PLETHYSMOGRAPHY LUNG VOLUMES: CPT

## 2025-07-24 PROCEDURE — 94010 BREATHING CAPACITY TEST: CPT

## 2025-07-24 PROCEDURE — 94729 DIFFUSING CAPACITY: CPT

## 2025-07-30 ENCOUNTER — RESULTS FOLLOW-UP (OUTPATIENT)
Age: 70
End: 2025-07-30

## 2025-07-30 NOTE — TELEPHONE ENCOUNTER
----- Message from CHULA Peter NP sent at 7/30/2025  2:24 PM EDT -----  Please call patient, advise PFT normal

## (undated) DEVICE — SPIROMETER INCENT 2500ML W ONE W VLV

## (undated) DEVICE — SOLUTION IRRIG 1000ML H2O STRL BLT

## (undated) DEVICE — WATERPROOF, BACTERIA PROOF DRESSING WITH ABSORBENT SEE THROUGH PAD: Brand: OPSITE POST-OP VISIBLE 15X10CM CTN 20

## (undated) DEVICE — SUTURE MCRYL SZ 3-0 L27IN ABSRB UD L19MM PS-2 3/8 CIR PRIM Y427H

## (undated) DEVICE — (D)BNDG ADHESIVE FABRIC 3/4X3 -- DISC BY MFR USE ITEM 357960

## (undated) DEVICE — KIT CLN UP BON SECOURS MARYV

## (undated) DEVICE — DRAPE,CHEST,FENES,15X10,STERIL: Brand: MEDLINE

## (undated) DEVICE — Device

## (undated) DEVICE — MEDIA CONTRAST 10ML 200MG/ML 41%

## (undated) DEVICE — TRAY MYEL SFTY +

## (undated) DEVICE — NDL SPNE MANAN 22GX6IN --

## (undated) DEVICE — KIT POS W/ FOAM ARM CRADL SHEARGUARD CHST PD CVR FOR SPNL

## (undated) DEVICE — 1010 S-DRAPE TOWEL DRAPE 10/BX: Brand: STERI-DRAPE™

## (undated) DEVICE — DRAPE CAM W7XL96IN CAM-WRAP

## (undated) DEVICE — ADHESIVE TISS DERMA FLEX 0.7ML -- HIGH VISCOSITY

## (undated) DEVICE — HEX-LOCKING BLADE ELECTRODE: Brand: EDGE

## (undated) DEVICE — (D)PREP SKN CHLRAPRP APPL 26ML -- CONVERT TO ITEM 371833

## (undated) DEVICE — SYR 10ML LUER LOK 1/5ML GRAD --

## (undated) DEVICE — SUTURE ABSORBABLE BRAIDED 2-0 CT-1 27 IN UD VICRYL J259H

## (undated) DEVICE — BLANKET WRM AD W50XL85.8IN PACU FULL BODY FORC AIR

## (undated) DEVICE — SOLUTION IV 1000ML 0.9% SOD CHL

## (undated) DEVICE — DRAPE XR C ARM 41X74IN LF --

## (undated) DEVICE — FLEX ADVANTAGE 3000CC: Brand: FLEX ADVANTAGE

## (undated) DEVICE — PROGRAMMER CHRGR PRECISION 3 -- SC-6412-3

## (undated) DEVICE — GARMENT,MEDLINE,DVT,INT,CALF,MED, GEN2: Brand: MEDLINE

## (undated) DEVICE — KIT REMOT CTRL NEUROSTIM -- SC-5562-1 FREELINK

## (undated) DEVICE — SUT SLK 0 30IN SH BLK --

## (undated) DEVICE — SUTURE VCRL SZ 2-0 L18IN ABSRB VLT CT-1 L36MM 1/2 CIR J739D

## (undated) DEVICE — REM POLYHESIVE ADULT PATIENT RETURN ELECTRODE: Brand: VALLEYLAB

## (undated) DEVICE — STOCKING COMPR L L31-34IN LNG 19MMHG ANK 9-10IN CALF

## (undated) DEVICE — STERILE LATEX POWDER-FREE SURGICAL GLOVESWITH NITRILE COATING: Brand: PROTEXIS

## (undated) DEVICE — INTENDED FOR TISSUE SEPARATION, AND OTHER PROCEDURES THAT REQUIRE A SHARP SURGICAL BLADE TO PUNCTURE OR CUT.: Brand: BARD-PARKER SAFETY BLADES SIZE 10, STERILE